# Patient Record
Sex: FEMALE | Race: BLACK OR AFRICAN AMERICAN | Employment: OTHER | URBAN - METROPOLITAN AREA
[De-identification: names, ages, dates, MRNs, and addresses within clinical notes are randomized per-mention and may not be internally consistent; named-entity substitution may affect disease eponyms.]

---

## 2016-04-12 LAB — COLONOSCOPY, EXTERNAL: NORMAL

## 2020-10-02 PROBLEM — R94.39 ABNORMAL STRESS TEST: Status: ACTIVE | Noted: 2018-04-13

## 2020-10-02 PROBLEM — E66.01 MORBID OBESITY (HCC): Status: ACTIVE | Noted: 2019-09-24

## 2020-10-02 PROBLEM — C64.2 RENAL CANCER, LEFT (HCC): Status: ACTIVE | Noted: 2020-09-14

## 2020-10-02 PROBLEM — Z88.0 ALLERGY TO PENICILLIN: Status: ACTIVE | Noted: 2017-04-13

## 2020-10-02 PROBLEM — R51.9 GENERALIZED HEADACHE: Status: ACTIVE | Noted: 2017-04-13

## 2020-10-02 PROBLEM — Z90.5 HISTORY OF PARTIAL NEPHRECTOMY: Status: ACTIVE | Noted: 2020-09-30

## 2020-10-02 PROBLEM — I10 ESSENTIAL HYPERTENSION: Status: ACTIVE | Noted: 2017-04-13

## 2020-10-02 PROBLEM — M17.9 OSTEOARTHRITIS OF KNEE: Status: ACTIVE | Noted: 2017-04-13

## 2020-10-02 PROBLEM — N28.89 RENAL MASS: Status: ACTIVE | Noted: 2020-09-09

## 2020-10-02 PROBLEM — I50.32 CHRONIC DIASTOLIC CONGESTIVE HEART FAILURE (HCC): Status: ACTIVE | Noted: 2018-04-17

## 2020-10-02 PROBLEM — G47.00 INSOMNIA: Status: ACTIVE | Noted: 2020-05-13

## 2020-10-02 PROBLEM — C64.9 RENAL CELL CARCINOMA (HCC): Status: ACTIVE | Noted: 2020-08-14

## 2020-10-02 PROBLEM — E11.9 TYPE 2 DIABETES MELLITUS (HCC): Status: ACTIVE | Noted: 2020-06-22

## 2020-10-02 PROBLEM — R60.9 EDEMA: Status: ACTIVE | Noted: 2017-04-13

## 2020-10-02 PROBLEM — I50.9 CONGESTIVE HEART FAILURE (HCC): Status: ACTIVE | Noted: 2018-04-20

## 2020-10-02 PROBLEM — E78.5 HYPERLIPIDEMIA: Status: ACTIVE | Noted: 2020-01-21

## 2021-06-17 LAB — HBA1C MFR BLD HPLC: 7 %

## 2022-01-13 ENCOUNTER — OFFICE VISIT (OUTPATIENT)
Dept: FAMILY MEDICINE CLINIC | Age: 62
End: 2022-01-13
Payer: COMMERCIAL

## 2022-01-13 VITALS
WEIGHT: 218 LBS | TEMPERATURE: 98.2 F | HEIGHT: 63 IN | SYSTOLIC BLOOD PRESSURE: 115 MMHG | RESPIRATION RATE: 20 BRPM | OXYGEN SATURATION: 99 % | HEART RATE: 73 BPM | DIASTOLIC BLOOD PRESSURE: 72 MMHG | BODY MASS INDEX: 38.62 KG/M2

## 2022-01-13 DIAGNOSIS — Z23 NEEDS FLU SHOT: ICD-10-CM

## 2022-01-13 DIAGNOSIS — I50.9 CONGESTIVE HEART FAILURE, UNSPECIFIED HF CHRONICITY, UNSPECIFIED HEART FAILURE TYPE (HCC): ICD-10-CM

## 2022-01-13 DIAGNOSIS — E11.9 CONTROLLED TYPE 2 DIABETES MELLITUS WITHOUT COMPLICATION, WITHOUT LONG-TERM CURRENT USE OF INSULIN (HCC): ICD-10-CM

## 2022-01-13 DIAGNOSIS — I10 ESSENTIAL HYPERTENSION: Primary | ICD-10-CM

## 2022-01-13 DIAGNOSIS — Z12.31 ENCOUNTER FOR SCREENING MAMMOGRAM FOR MALIGNANT NEOPLASM OF BREAST: ICD-10-CM

## 2022-01-13 PROCEDURE — 90686 IIV4 VACC NO PRSV 0.5 ML IM: CPT | Performed by: FAMILY MEDICINE

## 2022-01-13 PROCEDURE — 99204 OFFICE O/P NEW MOD 45 MIN: CPT | Performed by: FAMILY MEDICINE

## 2022-01-13 PROCEDURE — 90471 IMMUNIZATION ADMIN: CPT | Performed by: FAMILY MEDICINE

## 2022-01-13 RX ORDER — LOSARTAN POTASSIUM 100 MG/1
100 TABLET ORAL DAILY
Qty: 90 TABLET | Refills: 0 | Status: SHIPPED | OUTPATIENT
Start: 2022-01-13 | End: 2022-03-21 | Stop reason: DRUGHIGH

## 2022-01-13 NOTE — PATIENT INSTRUCTIONS
Vaccine Information Statement    Influenza (Flu) Vaccine (Inactivated or Recombinant): What You Need to Know    Many vaccine information statements are available in [place] and other languages. See www.immunize.org/vis. Hojas de información sobre vacunas están disponibles en español y en muchos otros idiomas. Visite www.immunize.org/vis. 1. Why get vaccinated? Influenza vaccine can prevent influenza (flu). Flu is a contagious disease that spreads around the [facility] every year, usually between October and May. Anyone can get the flu, but it is more dangerous for some people. Infants and young children, people 72 years and older, pregnant people, and people with certain health conditions or a weakened immune system are at greatest risk of flu complications. Pneumonia, bronchitis, sinus infections, and ear infections are examples of flu-related complications. If you have a medical condition, such as heart disease, cancer, or diabetes, flu can make it worse. Flu can cause fever and chills, sore throat, muscle aches, fatigue, cough, headache, and runny or stuffy nose. Some people may have vomiting and diarrhea, though this is more common in children than adults. In an average year, thousands of people in the [facility] die from flu, and many more are hospitalized. Flu vaccine prevents millions of illnesses and flu-related visits to the doctor each year. 2. Influenza vaccines     CDC recommends everyone 6 months and older get vaccinated every flu season. Children 6 months through 6years of age may need 2 doses during a single flu season. Everyone else needs only 1 dose each flu season. It takes about 2 weeks for protection to develop after vaccination. There are many flu viruses, and they are always changing. Each year a new flu vaccine is made to protect against the influenza viruses believed to be likely to cause disease in the upcoming flu season.  Even when the vaccine doesnt exactly match these viruses, it may still provide some protection. Influenza vaccine does not cause flu. Influenza vaccine may be given at the same time as other vaccines. 3. Talk with your health care provider    Tell your vaccination provider if the person getting the vaccine:   Has had an allergic reaction after a previous dose of influenza vaccine, or has any severe, life-threatening allergies    Has ever had Guillain-Barré Syndrome (also called GBS)    In some cases, your health care provider may decide to postpone influenza vaccination until a future visit. Influenza vaccine can be administered at any time during pregnancy. People who are or will be pregnant during influenza season should receive inactivated influenza vaccine. People with minor illnesses, such as a cold, may be vaccinated. People who are moderately or severely ill should usually wait until they recover before getting influenza vaccine. Your health care provider can give you more information. 4. Risks of a vaccine reaction     Soreness, redness, and swelling where the shot is given, fever, muscle aches, and headache can happen after influenza vaccination.  There may be a very small increased risk of Guillain-Barré Syndrome (GBS) after inactivated influenza vaccine (the flu shot). Lyubov Paul children who get the flu shot along with pneumococcal vaccine (PCV13) and/or DTaP vaccine at the same time might be slightly more likely to have a seizure caused by fever. Tell your health care provider if a child who is getting flu vaccine has ever had a seizure. People sometimes faint after medical procedures, including vaccination. Tell your provider if you feel dizzy or have vision changes or ringing in the ears. As with any medicine, there is a very remote chance of a vaccine causing a severe allergic reaction, other serious injury, or death. 5. What if there is a serious problem?     An allergic reaction could occur after the vaccinated person leaves the clinic. If you see signs of a severe allergic reaction (hives, swelling of the face and throat, difficulty breathing, a fast heartbeat, dizziness, or weakness), call 9-1-1 and get the person to the nearest hospital.    For other signs that concern you, call your health care provider. Adverse reactions should be reported to the Vaccine Adverse Event Reporting System (VAERS). Your health care provider will usually file this report, or you can do it yourself. Visit the VAERS website at www.vaers. [facility].gov or call 3-229.638.8589. VAERS is only for reporting reactions, and VAERS staff members do not give medical advice. 6. The National Vaccine Injury Compensation Program    The [facility] Vaccine Injury Compensation Program (VICP) is a federal program that was created to compensate people who may have been injured by certain vaccines. Claims regarding alleged injury or death due to vaccination have a time limit for filing, which may be as short as two years. Visit the VICP website at www.[facility].gov/vaccinecompensation or call 6-562.931.1107 to learn about the program and about filing a claim. 7. How can I learn more?  Ask your health care provider.  Call your local or state health department.  Visit the website of the Food and Drug Administration (FDA) for vaccine package inserts and additional information at www.fda.gov/vaccines-blood-biologics/vaccines.  Contact the Centers for Disease Control and Prevention (CDC):  - Call 5-442-451--529-879-9034 (5-967-NNJ-INFO) or  - Visit CDCs influenza website at www.cdc.gov/flu. Vaccine Information Statement   Inactivated Influenza Vaccine   8/6/2021  42 JOEJoo Gallo [id number]   Department of Health and Human Services  Centers for Disease Control and Prevention    Office Use Only

## 2022-01-13 NOTE — PROGRESS NOTES
Room:     Identified pt with two pt identifiers(name and ). Reviewed record in preparation for visit and have obtained necessary documentation. All patient medications has been reviewed. Chief Complaint   Patient presents with    New Patient    Establish Care    Medication Refill    Diabetes       Health Maintenance Due   Topic    Hepatitis C Screening     Foot Exam Q1     A1C test (Diabetic or Prediabetic)     MICROALBUMIN Q1     Eye Exam Retinal or Dilated     Lipid Screen     DTaP/Tdap/Td series (1 - Tdap)    Colorectal Cancer Screening Combo     Shingrix Vaccine Age 50> (1 of 2)    Breast Cancer Screen Mammogram     Flu Vaccine (1)       Vitals:    22 1416   BP: 115/72   Pulse: 73   Resp: 20   Temp: 98.2 °F (36.8 °C)   TempSrc: Oral   SpO2: 99%   Weight: 218 lb (98.9 kg)   Height: 5' 2.5\" (1.588 m)   PainSc:   4   PainLoc: Back       4. Have you been to the ER, urgent care clinic since your last visit? Hospitalized since your last visit? No    5. Have you seen or consulted any other health care providers outside of the [address] [place] [address] since your last visit? Include any pap smears or colon screening. No    6. Would you like to receive your flu shot today? Yes        Patient is accompanied by self I have received verbal consent from Glenna Jordan to discuss any/all medical information while they are present in the room.

## 2022-01-13 NOTE — PROGRESS NOTES
Nils Mitchell (: 1960) is a 59 y.o. female, new patient, here for evaluation of the following chief complaint(s):  New Patient, Establish Care, Medication Refill, and Diabetes         ASSESSMENT/PLAN:  Below is the assessment and plan developed based on review of pertinent history, physical exam, labs, studies, and medications. 1. Essential hypertension  Blood pressure at goal.  Labs reviewed from 2021 normal electrolytes and GFR. Calcium elevated at 10.9, plan to recheck labs in 2 to 4 weeks in office as no staff to draw lab available today, patient elects to return to office rather than go to [facility].   -     losartan (COZAAR) 100 mg tablet; Take 1 Tablet by mouth daily. , Normal, Disp-90 Tablet, R-0    2. Controlled type 2 diabetes mellitus without complication, without long-term current use of insulin (HCC)  Continue metformin, A1C 7.9 2021. Plan to recheck at follow up. 3. Encounter for screening mammogram for malignant neoplasm of breast  Due for mammogram.  -     SHAYE MAMMO BI SCREENING INCL CAD; Future    4. Needs flu shot  No contraindications noted. See nursing note for administration details. -     INFLUENZA VIRUS VAC QUAD,SPLIT,PRESV FREE SYRINGE IM    5. Congestive heart failure, unspecified HF chronicity, unspecified heart failure type ([facility] [address]  Diagnosed with hospitalization with exacerbation . Needs local cardiologist. Referral sent to Dr. Ladarius Brooks. -     REFERRAL TO CARDIOLOGY    6. Hyperlipidemia   Last labs 2021 cholesterol at goal. Continue lipitor 40mg. Recheck with fasting labs in 2 to 4 weeks. Return for 2 to 4 weeks for fasting labs . SUBJECTIVE/OBJECTIVE:  Chief Complaint   Patient presents with    New Patient    Establish Care    Medication Refill    Diabetes     Patient is a 58 yo female with diabetes, hypertension, hyperlipidemia, and congestive heart failure presenting to office to establish care. For diabetes, patient was diagnosed in 2020. She takes metformin 500mg BID. Reports fasting blood glucose has been around 120 every day. Last labwork including urine microalbumin was  6 months ago. For hypertension patient is taking losartan 100mg, furosemide 40mg, and Toprol XL 25mg. Blood pressure at home 130/70. Denies chest pain, SOB, headache, vision changes, weakness. She was diagnosed with CHF in 2020 as well after acute exacerbation. She takes furosemide 40mg daily as well as metoprolol 25mg XL. She has not seen cardiologist since discharge from hospital in 2020, only following with PCP. 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 Also had cardiac catheterization in 2020 which showed no CAD. Reports mild lower extremity edema towards the end of the day daily since CHF exacerbation. Renal cell carcinoma in 2020 as well. Managed by [facility]. Her  had a stroke 5 weeks ago and she is primary caregiver. They live with daughter and son in law. Reports she has difficulty sleeping at night, worried her  will get up and leave due to memory dysfunction. She use to take trazadone or ambien but stopped due to concern for memory during the day. She does not snore. Review of Systems   Constitutional: Negative for fatigue, fever and unexpected weight change. HENT: Negative for hearing loss. Eyes: Negative for pain and visual disturbance. Respiratory: Negative for cough, chest tightness and shortness of breath. Cardiovascular: Negative for chest pain, palpitations and leg swelling. Gastrointestinal: Negative for abdominal distention, abdominal pain, blood in stool, constipation and diarrhea. Genitourinary: Negative for dysuria and menstrual problem. Musculoskeletal: Negative for arthralgias and joint swelling. Neurological: Negative for dizziness, light-headedness and headaches. Psychiatric/Behavioral: Negative for agitation and behavioral problems.        Past Surgical History:   Procedure Laterality Date  HX HEART CATHETERIZATION      HX HYSTERECTOMY      HX NEPHRECTOMY  09/09/2020    Robotic assisted laparoscopic left partial nephrectomy    HX OTHER SURGICAL       Social History     Tobacco Use    Smoking status: Never Smoker    Smokeless tobacco: Never Used   Vaping Use    Vaping Use: Never used   Substance Use Topics    Alcohol use: Yes     Comment: social    Drug use: Never     Family History   Problem Relation Age of Onset    Lung Cancer Father     Heart Disease Maternal Grandmother     Heart Disease Mother      Patient Active Problem List    Diagnosis Date Noted    History of partial nephrectomy 09/30/2020    Renal cancer, left ([facility] [address] 09/14/2020    Renal mass 09/09/2020    Renal cell carcinoma ([facility] [address] 08/14/2020    Controlled type 2 diabetes mellitus without complication, without long-term current use of insulin ([facility] [address] 06/22/2020    Insomnia 05/13/2020    Hyperlipidemia 01/21/2020    Morbid obesity ([facility] [address] 09/24/2019    Congestive heart failure ([facility] [address] 04/20/2018    Chronic diastolic congestive heart failure ([facility] [address] 04/17/2018    Abnormal stress test 04/13/2018    Osteoarthritis of knee 04/13/2017    Generalized headache 04/13/2017    Essential hypertension 04/13/2017    Edema 04/13/2017    Allergy to penicillin 04/13/2017    S/P vaginal hysterectomy 07/19/2012    Uterine leiomyoma 07/18/2012    Abdominal pain, right lateral 07/12/2012    Stress incontinence in female 07/12/2012    Second degree uterine prolapse 07/12/2012    Pelvic relaxation disorder 07/12/2012    Menorrhagia 07/12/2012       Current Outpatient Medications on File Prior to Visit   Medication Sig Dispense Refill    acetaminophen (Tylenol Extra Strength) 500 mg tablet Take  by mouth every six (6) hours as needed for Pain.       atorvastatin (LIPITOR) 40 mg tablet TK 1 T PO QD HS      metFORMIN (GLUCOPHAGE) 500 mg tablet TK 2 TS PO BID      metoprolol succinate (TOPROL-XL) 25 mg XL tablet metoprolol succinate ER 25 mg tablet,extended release 24 hr      furosemide (LASIX) 40 mg tablet TK 1 T PO QD      [DISCONTINUED] baclofen (LIORESAL) 10 mg tablet baclofen 10 mg tablet   TAKE 1 TABLET BY MOUTH FOUR TIMES A DAY AS NEEDED      [DISCONTINUED] zolpidem (AMBIEN) 5 mg tablet zolpidem 5 mg tablet   Take 1 tablet every day by oral route.  [DISCONTINUED] traZODone (DESYREL) 50 mg tablet trazodone 50 mg tablet   Take 1 tablet every day by oral route as needed.  [DISCONTINUED] losartan (COZAAR) 100 mg tablet Take 100 mg by mouth daily. No current facility-administered medications on file prior to visit. Vitals:    01/13/22 1416   BP: 115/72   Pulse: 73   Resp: 20   Temp: 98.2 °F (36.8 °C)   TempSrc: Oral   SpO2: 99%   Weight: 218 lb (98.9 kg)   Height: 5' 2.5\" (1.588 m)   PainSc:   4   PainLoc: Back        Physical Exam  Constitutional:       Appearance: Normal appearance. HENT:      Head: Normocephalic and atraumatic. Eyes:      Extraocular Movements: Extraocular movements intact. Conjunctiva/sclera: Conjunctivae normal.      Pupils: Pupils are equal, round, and reactive to light. Cardiovascular:      Rate and Rhythm: Normal rate and regular rhythm. Pulses: Normal pulses. Heart sounds: Murmur (1/6 soft holosystolic murmur heart over aortica and pulmonic areas. does not radiate to carotids) heard. Pulmonary:      Effort: Pulmonary effort is normal.      Breath sounds: Normal breath sounds. Abdominal:      General: Abdomen is flat. Bowel sounds are normal.      Palpations: Abdomen is soft. Musculoskeletal:      Right lower leg: No edema. Left lower leg: No edema. Neurological:      Mental Status: She is alert. Psychiatric:         Mood and Affect: Mood normal.         Behavior: Behavior normal.             An electronic signature was used to authenticate this note.   -- Willian Castaneda PA-C

## 2022-01-20 ENCOUNTER — TRANSCRIBE ORDER (OUTPATIENT)
Dept: SCHEDULING | Age: 62
End: 2022-01-20

## 2022-01-20 DIAGNOSIS — Z12.31 VISIT FOR SCREENING MAMMOGRAM: Primary | ICD-10-CM

## 2022-01-24 ENCOUNTER — HOSPITAL ENCOUNTER (OUTPATIENT)
Dept: MAMMOGRAPHY | Age: 62
Discharge: HOME | End: 2022-01-24
Attending: FAMILY MEDICINE
Payer: COMMERCIAL

## 2022-01-24 DIAGNOSIS — Z12.31 VISIT FOR SCREENING MAMMOGRAM: ICD-10-CM

## 2022-01-24 PROCEDURE — 77063 BREAST TOMOSYNTHESIS BI: CPT

## 2022-01-25 NOTE — PROGRESS NOTES
Please call patient and let her know screening mammogram showed no evidence of malignancy, recommend repeat in 1 year.    Rita Mathis PA-C

## 2022-02-08 DIAGNOSIS — I10 ESSENTIAL HYPERTENSION: ICD-10-CM

## 2022-02-08 RX ORDER — LOSARTAN POTASSIUM 100 MG/1
100 TABLET ORAL DAILY
Qty: 90 TABLET | Refills: 0 | OUTPATIENT
Start: 2022-02-08

## 2022-02-08 RX ORDER — METFORMIN HYDROCHLORIDE 500 MG/1
1000 TABLET ORAL 2 TIMES DAILY WITH MEALS
Qty: 120 TABLET | Refills: 2 | Status: SHIPPED | OUTPATIENT
Start: 2022-02-08 | End: 2022-05-11

## 2022-02-08 RX ORDER — FUROSEMIDE 40 MG/1
40 TABLET ORAL DAILY
Qty: 90 TABLET | Refills: 0 | Status: SHIPPED | OUTPATIENT
Start: 2022-02-08 | End: 2022-05-11

## 2022-02-08 RX ORDER — METFORMIN HYDROCHLORIDE 500 MG/1
TABLET ORAL
OUTPATIENT
Start: 2022-02-08

## 2022-02-08 NOTE — TELEPHONE ENCOUNTER
Can you call patient and verify metformin dose? Does she take 1 or 2 tablets twice a day? Also, I just sent in losartan about 3 weeks ago. Did she get that rx? It was for 90 days.

## 2022-02-08 NOTE — TELEPHONE ENCOUNTER
Pt id x 3. States that she takes metformin as pended and also needs the furosemide that was pended as well.

## 2022-02-10 ENCOUNTER — OFFICE VISIT (OUTPATIENT)
Dept: FAMILY MEDICINE CLINIC | Age: 62
End: 2022-02-10
Payer: COMMERCIAL

## 2022-02-10 VITALS
SYSTOLIC BLOOD PRESSURE: 109 MMHG | HEART RATE: 63 BPM | HEIGHT: 62 IN | BODY MASS INDEX: 40.34 KG/M2 | OXYGEN SATURATION: 100 % | DIASTOLIC BLOOD PRESSURE: 72 MMHG | WEIGHT: 219.2 LBS | TEMPERATURE: 98 F | RESPIRATION RATE: 14 BRPM

## 2022-02-10 DIAGNOSIS — E11.9 CONTROLLED TYPE 2 DIABETES MELLITUS WITHOUT COMPLICATION, WITHOUT LONG-TERM CURRENT USE OF INSULIN (HCC): ICD-10-CM

## 2022-02-10 DIAGNOSIS — R59.0 EPITROCHLEAR LYMPHADENOPATHY: Primary | ICD-10-CM

## 2022-02-10 DIAGNOSIS — M79.602 LEFT ARM PAIN: ICD-10-CM

## 2022-02-10 DIAGNOSIS — C64.2 RENAL CELL CARCINOMA OF LEFT KIDNEY (HCC): ICD-10-CM

## 2022-02-10 DIAGNOSIS — E78.5 HYPERLIPIDEMIA, UNSPECIFIED HYPERLIPIDEMIA TYPE: ICD-10-CM

## 2022-02-10 DIAGNOSIS — I10 ESSENTIAL HYPERTENSION: ICD-10-CM

## 2022-02-10 PROCEDURE — 99214 OFFICE O/P EST MOD 30 MIN: CPT | Performed by: FAMILY MEDICINE

## 2022-02-10 NOTE — PROGRESS NOTES
Kermit Severe (: 1960) is a 59 y.o. female, established patient, here for evaluation of the following chief complaint(s):  Follow-up and Labs         ASSESSMENT/PLAN:  Below is the assessment and plan developed based on review of pertinent history, physical exam, labs, studies, and medications. 1. Epitrochlear lymphadenopathy  2. Left arm pain  1 small epitrochlear lymph node palpated on exam. With history of renal cell carcinoma and concurrent night sweats x 4 months, proceed with labs and ultrasound of left arm.   -     US EXT NONVAS LT COMP; Future    3. Renal cell carcinoma of left kidney (HCC)  Renal cell carcinoma in 2020, she followed with [facility]. Check CMP, also check urine with micro given new possible epitrochlear lymphadenopathy   -     URINALYSIS W/MICROSCOPIC; Future  -     METABOLIC PANEL, COMPREHENSIVE; Future    4. Controlled type 2 diabetes mellitus without complication, without long-term current use of insulin (HCC)  Continue metformin, A1C 7.9 2021. Recheck today. Diabetic foot exam today looked good. Last retinal eye exam per patient 10/2021.  -     MICROALBUMIN, UR, RAND W/ MICROALB/CREAT RATIO; Future  -     METABOLIC PANEL, COMPREHENSIVE; Future  -     CBC WITH AUTOMATED DIFF; Future  -     HEMOGLOBIN A1C WITH EAG; Future  -      DIABETES FOOT EXAM    5. Essential hypertension  Labs reviewed from 2021 normal electrolytes and GFR. Calcium elevated at 10.9  Recheck labs today. BP at goal in office 503/99  -     METABOLIC PANEL, COMPREHENSIVE; Future  -     CBC WITH AUTOMATED DIFF; Future    6. Hyperlipidemia, unspecified hyperlipidemia type  Last labs 2021 cholesterol at goal. Continue lipitor 40mg,, recheck fasting labs today. -     METABOLIC PANEL, COMPREHENSIVE; Future  -     LIPID PANEL; Future      No follow-ups on file.       SUBJECTIVE/OBJECTIVE:  Chief Complaint   Patient presents with    Follow-up    Labs     Patient is a 58 yo female with hypertension, hyperlipidemia, T2DM, CHD, history of renal cell carcinoma, left, 2020 presenting to office for physical.     For acute complaints, patient notes left arm pain x 1 month. Notes pain proximal to medial epicondyle with tender bumps. Notes over last month bumps seem to have spread distally in linear pattern to forearm. Also c/o intermittent night sweats for last 4 months. These started after  had a stroke. She wakes 4/7 nights per week drenched in sweat. Denies fevers, chills, recent illness, or bone pain. She does report 1 episode last week of sharp, LLQ pain that resolved after 5 minutes and has not returned. Reports last diabetic eye exam 10/2021. Review of Systems   Constitutional: Negative for activity change, appetite change, chills, diaphoresis, fatigue, fever and unexpected weight change. HENT: Negative for congestion. Eyes: Negative for visual disturbance. Respiratory: Negative for cough, chest tightness and shortness of breath. Cardiovascular: Negative for chest pain, palpitations and leg swelling. Gastrointestinal: Negative for abdominal distention, anal bleeding, blood in stool, constipation, diarrhea and nausea. Genitourinary: Negative for flank pain, frequency and hematuria. Musculoskeletal: Negative for back pain, neck pain and neck stiffness. Neurological: Negative for weakness, light-headedness and headaches. Current Outpatient Medications on File Prior to Visit   Medication Sig Dispense Refill    furosemide (LASIX) 40 mg tablet Take 1 Tablet by mouth daily. 90 Tablet 0    metFORMIN (GLUCOPHAGE) 500 mg tablet Take 2 Tablets by mouth two (2) times daily (with meals). 120 Tablet 2    losartan (COZAAR) 100 mg tablet Take 1 Tablet by mouth daily. 90 Tablet 0    acetaminophen (Tylenol Extra Strength) 500 mg tablet Take  by mouth every six (6) hours as needed for Pain.       atorvastatin (LIPITOR) 40 mg tablet TK 1 T PO QD HS      metoprolol succinate (TOPROL-XL) 25 mg XL tablet metoprolol succinate ER 25 mg tablet,extended release 24 hr       No current facility-administered medications on file prior to visit. Patient Active Problem List    Diagnosis Date Noted    History of partial nephrectomy 09/30/2020    Renal cancer, left ([facility] 09/14/2020    Renal mass 09/09/2020    Renal cell carcinoma ([facility] 08/14/2020    Controlled type 2 diabetes mellitus without complication, without long-term current use of insulin ([facility] 06/22/2020    Insomnia 05/13/2020    Hyperlipidemia 01/21/2020    Morbid obesity ([facility] 09/24/2019    Congestive heart failure ([facility] 04/20/2018    Chronic diastolic congestive heart failure ([facility] 04/17/2018    Abnormal stress test 04/13/2018    Osteoarthritis of knee 04/13/2017    Generalized headache 04/13/2017    Essential hypertension 04/13/2017    Edema 04/13/2017    Allergy to penicillin 04/13/2017    S/P vaginal hysterectomy 07/19/2012    Uterine leiomyoma 07/18/2012    Abdominal pain, right lateral 07/12/2012    Stress incontinence in female 07/12/2012    Second degree uterine prolapse 07/12/2012    Pelvic relaxation disorder 07/12/2012    Menorrhagia 07/12/2012       Vitals:    02/10/22 0839 02/10/22 0918   BP: (!) 144/72 109/72   Pulse: 63    Resp: 14    Temp: 98 °F (36.7 °C)    TempSrc: Oral    SpO2: 100%    Weight: 219 lb 3.2 oz (99.4 kg)    Height: 5' 2\" (1.575 m)    PainSc:   0 - No pain         Physical Exam  Constitutional:       Appearance: Normal appearance. HENT:      Head: Normocephalic and atraumatic. Eyes:      Extraocular Movements: Extraocular movements intact. Conjunctiva/sclera: Conjunctivae normal.      Pupils: Pupils are equal, round, and reactive to light. Cardiovascular:      Rate and Rhythm: Normal rate and regular rhythm. Pulses: Normal pulses. Heart sounds: Normal heart sounds.    Pulmonary:      Effort: Pulmonary effort is normal.      Breath sounds: Normal breath sounds. Chest:   Breasts:      Right: No axillary adenopathy or supraclavicular adenopathy. Left: No axillary adenopathy or supraclavicular adenopathy. Abdominal:      General: Abdomen is flat. Bowel sounds are normal.      Palpations: Abdomen is soft. Musculoskeletal:      Right shoulder: Normal.      Left shoulder: Normal.      Right upper arm: Normal.      Left upper arm: Tenderness (mild tenderness to soft tissue anterior left arm and forearm. ) present. No swelling, edema, deformity or bony tenderness. Right elbow: Normal.      Left elbow: Normal.      Right wrist: Normal. Normal pulse. Left wrist: Normal. Normal pulse. Right hand: Normal. Normal capillary refill. Normal pulse. Left hand: Normal. Normal capillary refill. Normal pulse. Lymphadenopathy:      Head:      Right side of head: No submental, submandibular, tonsillar, preauricular, posterior auricular or occipital adenopathy. Left side of head: No submental, submandibular, tonsillar, preauricular, posterior auricular or occipital adenopathy. Cervical: No cervical adenopathy. Right cervical: No superficial, deep or posterior cervical adenopathy. Left cervical: No superficial, deep or posterior cervical adenopathy. Upper Body:      Right upper body: No supraclavicular, axillary, pectoral or epitrochlear adenopathy. Left upper body: Epitrochlear adenopathy (1 epotroclear lymph node palpated ) present. No supraclavicular, axillary or pectoral adenopathy. Neurological:      Mental Status: She is alert.    Psychiatric:         Mood and Affect: Mood normal.         Behavior: Behavior normal.         Diabetic foot exam:     Left Foot:   Visual Exam: callous - small callous heel    Pulse DP: 2+ (normal)   Filament test: normal sensation          Right Foot:   Visual Exam: callous - small callous heel   Pulse DP: 2+ (normal)   Filament test: normal sensation       An electronic signature was used to authenticate this note.   -- Liseth Kennedy PA-C

## 2022-02-10 NOTE — PROGRESS NOTES
Miky Martinez is a 59 y.o. female      Chief Complaint   Patient presents with    Follow-up    Labs       1. Have you been to the ER, urgent care clinic since your last visit? Hospitalized since your last visit? No    2. Have you seen or consulted any other health care providers outside of the [address] [place] [address] since your last visit? Include any pap smears or colon screening.  No

## 2022-02-11 LAB
ALBUMIN SERPL-MCNC: 3.5 G/DL (ref 3.5–5)
ALBUMIN/GLOB SERPL: 0.9 {RATIO} (ref 1.1–2.2)
ALP SERPL-CCNC: 77 U/L (ref 45–117)
ALT SERPL-CCNC: 23 U/L (ref 12–78)
ANION GAP SERPL CALC-SCNC: 5 MMOL/L (ref 5–15)
APPEARANCE UR: ABNORMAL
AST SERPL-CCNC: 25 U/L (ref 15–37)
BACTERIA URNS QL MICRO: NEGATIVE /HPF
BASOPHILS # BLD: 0.1 K/UL (ref 0–0.1)
BASOPHILS NFR BLD: 1 % (ref 0–1)
BILIRUB SERPL-MCNC: 0.8 MG/DL (ref 0.2–1)
BILIRUB UR QL: NEGATIVE
BUN SERPL-MCNC: 14 MG/DL (ref 6–20)
BUN/CREAT SERPL: 17 (ref 12–20)
CALCIUM SERPL-MCNC: 10.7 MG/DL (ref 8.5–10.1)
CHLORIDE SERPL-SCNC: 104 MMOL/L (ref 97–108)
CHOLEST SERPL-MCNC: 125 MG/DL
CO2 SERPL-SCNC: 29 MMOL/L (ref 21–32)
COLOR UR: ABNORMAL
CREAT SERPL-MCNC: 0.81 MG/DL (ref 0.55–1.02)
CREAT UR-MCNC: 81.3 MG/DL
DIFFERENTIAL METHOD BLD: NORMAL
EOSINOPHIL # BLD: 0.3 K/UL (ref 0–0.4)
EOSINOPHIL NFR BLD: 5 % (ref 0–7)
EPITH CASTS URNS QL MICRO: ABNORMAL /LPF
ERYTHROCYTE [DISTWIDTH] IN BLOOD BY AUTOMATED COUNT: 13.1 % (ref 11.5–14.5)
EST. AVERAGE GLUCOSE BLD GHB EST-MCNC: 166 MG/DL
GLOBULIN SER CALC-MCNC: 3.8 G/DL (ref 2–4)
GLUCOSE SERPL-MCNC: 130 MG/DL (ref 65–100)
GLUCOSE UR STRIP.AUTO-MCNC: NEGATIVE MG/DL
HBA1C MFR BLD: 7.4 % (ref 4–5.6)
HCT VFR BLD AUTO: 37.7 % (ref 35–47)
HDLC SERPL-MCNC: 68 MG/DL
HDLC SERPL: 1.8 {RATIO} (ref 0–5)
HGB BLD-MCNC: 11.7 G/DL (ref 11.5–16)
HGB UR QL STRIP: NEGATIVE
HYALINE CASTS URNS QL MICRO: ABNORMAL /LPF (ref 0–5)
IMM GRANULOCYTES # BLD AUTO: 0 K/UL (ref 0–0.04)
IMM GRANULOCYTES NFR BLD AUTO: 0 % (ref 0–0.5)
KETONES UR QL STRIP.AUTO: NEGATIVE MG/DL
LDLC SERPL CALC-MCNC: 41.8 MG/DL (ref 0–100)
LEUKOCYTE ESTERASE UR QL STRIP.AUTO: NEGATIVE
LYMPHOCYTES # BLD: 1.7 K/UL (ref 0.8–3.5)
LYMPHOCYTES NFR BLD: 32 % (ref 12–49)
MCH RBC QN AUTO: 29.5 PG (ref 26–34)
MCHC RBC AUTO-ENTMCNC: 31 G/DL (ref 30–36.5)
MCV RBC AUTO: 95 FL (ref 80–99)
MICROALBUMIN UR-MCNC: 0.78 MG/DL
MICROALBUMIN/CREAT UR-RTO: 10 MG/G (ref 0–30)
MONOCYTES # BLD: 0.6 K/UL (ref 0–1)
MONOCYTES NFR BLD: 11 % (ref 5–13)
NEUTS SEG # BLD: 2.7 K/UL (ref 1.8–8)
NEUTS SEG NFR BLD: 51 % (ref 32–75)
NITRITE UR QL STRIP.AUTO: NEGATIVE
NRBC # BLD: 0 K/UL (ref 0–0.01)
NRBC BLD-RTO: 0 PER 100 WBC
PH UR STRIP: 5.5 [PH] (ref 5–8)
PLATELET # BLD AUTO: 317 K/UL (ref 150–400)
PMV BLD AUTO: 10 FL (ref 8.9–12.9)
POTASSIUM SERPL-SCNC: 4.6 MMOL/L (ref 3.5–5.1)
PROT SERPL-MCNC: 7.3 G/DL (ref 6.4–8.2)
PROT UR STRIP-MCNC: NEGATIVE MG/DL
RBC # BLD AUTO: 3.97 M/UL (ref 3.8–5.2)
RBC #/AREA URNS HPF: ABNORMAL /HPF (ref 0–5)
SODIUM SERPL-SCNC: 138 MMOL/L (ref 136–145)
SP GR UR REFRACTOMETRY: 1.01 (ref 1–1.03)
TRIGL SERPL-MCNC: 76 MG/DL (ref ?–150)
UROBILINOGEN UR QL STRIP.AUTO: 0.2 EU/DL (ref 0.2–1)
VLDLC SERPL CALC-MCNC: 15.2 MG/DL
WBC # BLD AUTO: 5.3 K/UL (ref 3.6–11)
WBC URNS QL MICRO: ABNORMAL /HPF (ref 0–4)

## 2022-02-14 NOTE — PROGRESS NOTES
Please call patient and advise. Her A1C remains above goal at 7.4. I recommend we add an additional medication called Trulicity. This is a once weekly injection. If she is agreeable to this I will send the starting dose to the pharmacy. It is also reasonable for her to return to the office to discuss starting this medication as I recommend she return to office in 2 weeks for repeat labs-- her calcium is slightly high and we should recheck this with Vit D and parathyroid hormone. Her cholesterol looks great. Blood counts are normal. Remainder of kidney, liver, and electrolytes look great. Urine micro normal. Urinalysis is negative for blood or other signs of kidney dysfunction.

## 2022-02-16 ENCOUNTER — HOSPITAL ENCOUNTER (OUTPATIENT)
Dept: ULTRASOUND IMAGING | Age: 62
Discharge: HOME | End: 2022-02-16
Attending: FAMILY MEDICINE
Payer: COMMERCIAL

## 2022-02-16 DIAGNOSIS — R59.0 EPITROCHLEAR LYMPHADENOPATHY: ICD-10-CM

## 2022-02-16 DIAGNOSIS — M79.602 LEFT ARM PAIN: ICD-10-CM

## 2022-02-16 PROCEDURE — 76882 US LMTD JT/FCL EVL NVASC XTR: CPT

## 2022-02-28 ENCOUNTER — OFFICE VISIT (OUTPATIENT)
Dept: CARDIOLOGY CLINIC | Age: 62
End: 2022-02-28
Payer: COMMERCIAL

## 2022-02-28 VITALS
DIASTOLIC BLOOD PRESSURE: 74 MMHG | BODY MASS INDEX: 39.93 KG/M2 | HEIGHT: 62 IN | WEIGHT: 217 LBS | SYSTOLIC BLOOD PRESSURE: 120 MMHG | HEART RATE: 67 BPM | OXYGEN SATURATION: 99 %

## 2022-02-28 DIAGNOSIS — R01.1 MURMUR: ICD-10-CM

## 2022-02-28 DIAGNOSIS — I50.32 CHRONIC DIASTOLIC CONGESTIVE HEART FAILURE (HCC): Primary | ICD-10-CM

## 2022-02-28 DIAGNOSIS — I10 ESSENTIAL HYPERTENSION: ICD-10-CM

## 2022-02-28 PROCEDURE — 93000 ELECTROCARDIOGRAM COMPLETE: CPT | Performed by: SPECIALIST

## 2022-02-28 PROCEDURE — 99204 OFFICE O/P NEW MOD 45 MIN: CPT | Performed by: SPECIALIST

## 2022-02-28 RX ORDER — CIDER VINEGAR 300 MG
1 TABLET ORAL EVERY MORNING
COMMUNITY
End: 2022-09-08

## 2022-02-28 RX ORDER — VITAMIN A 2400 MCG
1 CAPSULE ORAL EVERY MORNING
COMMUNITY
End: 2022-09-08

## 2022-02-28 NOTE — PROGRESS NOTES
Alexander Murillo is a 59 y.o. female    Visit Vitals  /74 (BP 1 Location: Left upper arm, BP Patient Position: Sitting, BP Cuff Size: Adult)   Pulse 67   Ht 5' 2\" (1.575 m)   Wt 217 lb (98.4 kg)   SpO2 99%   BMI 39.69 kg/m²       Chief Complaint   Patient presents with    CHF    Cholesterol Problem    Hypertension       Chest pain NO  SOB NO  Dizziness NO  Swelling LEGS AND ANKLES  Recent hospital visit NO  Refills NO  COVID VACCINE STATUS YES  HAD COVID?  NO

## 2022-02-28 NOTE — PROGRESS NOTES
Angelica Herrera MD. [facility]              Patient: Glenna Jordan  : 1960      Today's Date: 2022        HISTORY OF PRESENT ILLNESS:     History of Present Illness:  Referred for CHF history. She says she has a history - was seeing Cardiology in [facility]. She has been doing fine lately. Has infrequent sharp chest ache. Breathing is OK - no orthopnea. Class 2 [place]. PAST MEDICAL HISTORY:     Past Medical History:   Diagnosis Date    Arthritis     [facility]     Diabetes ([facility]     Hypertension     Left renal mass     Renal cell cancer, left (HCC)     Clear cell renal cell carcinoma. Past Surgical History:   Procedure Laterality Date    HX HEART CATHETERIZATION      HX HYSTERECTOMY      HX NEPHRECTOMY  2020    Robotic assisted laparoscopic left partial nephrectomy    HX OTHER SURGICAL             MEDICATIONS:     Current Outpatient Medications   Medication Sig Dispense Refill    TURMERIC, BULK, by Does Not Apply route.  ginger, Zingiber officinalis, (ginger extract) 250 mg cap Take  by mouth.  Apple Cider Vinegar 300 mg tab Take  by mouth.  furosemide (LASIX) 40 mg tablet Take 1 Tablet by mouth daily. 90 Tablet 0    metFORMIN (GLUCOPHAGE) 500 mg tablet Take 2 Tablets by mouth two (2) times daily (with meals). 120 Tablet 2    losartan (COZAAR) 100 mg tablet Take 1 Tablet by mouth daily. 90 Tablet 0    acetaminophen (Tylenol Extra Strength) 500 mg tablet Take  by mouth every six (6) hours as needed for Pain.       atorvastatin (LIPITOR) 40 mg tablet TK 1 T PO QD HS      metoprolol succinate (TOPROL-XL) 25 mg XL tablet metoprolol succinate ER 25 mg tablet,extended release 24 hr         Allergies   Allergen Reactions    Cephalexin Rash    Doxycycline Nausea Only    Hydrocodone-Acetaminophen Itching    Meperidine Itching    Oxycodone-Acetaminophen Hives and Rash    Penicillins Hives and Itching    Propoxyphene N-Acetaminophen Hives and Rash    Shellfish Containing Products Other (comments) and Rash     Stomach pains      Sulfa (Sulfonamide Antibiotics) Itching    Triamterene-Hydrochlorothiazid Myalgia             SOCIAL HISTORY:     Social History     Tobacco Use    Smoking status: Never Smoker    Smokeless tobacco: Never Used   Vaping Use    Vaping Use: Never used   Substance Use Topics    Alcohol use: Yes     Comment: social    Drug use: Never         FAMILY HISTORY:     Family History   Problem Relation Age of Onset    Lung Cancer Father     Heart Disease Maternal Grandmother     Heart Disease Mother     Breast Cancer Paternal Aunt            REVIEW OF SYMPTOMS:     Review of Symptoms:  Constitutional: Negative for fever, chills  HEENT: Negative for nosebleeds, tinnitus, and vision changes. Respiratory: Negative for cough, wheezing  Cardiovascular: Negative for orthopnea, claudication, syncope, and PND. Gastrointestinal: Negative for abdominal pain, diarrhea, melena. Genitourinary: Negative for dysuria  Musculoskeletal: Negative for myalgias. Skin: Negative for rash  Heme: No problems bleeding. Neurological: Negative for speech change and focal weakness. PHYSICAL EXAM:     Physical Exam:  Visit Vitals  /74 (BP 1 Location: Left upper arm, BP Patient Position: Sitting, BP Cuff Size: Adult)   Pulse 67   Ht 5' 2\" (1.575 m)   Wt 217 lb (98.4 kg)   SpO2 99%   BMI 39.69 kg/m²     Patient appears generally well, mood and affect are appropriate and pleasant. HEENT:  Hearing intact, non-icteric, normocephalic, atraumatic. Neck Exam: Supple, No JVD or carotid bruits. Lung Exam: Clear to auscultation, even breath sounds. Cardiac Exam: Regular rate and rhythm with 7-6/1 systolic murmur sternal border  Abdomen: Soft, non-tender, normal bowel sounds. Obese   Extremities: Moves all ext well. No lower extremity edema.   MSKTL: Overall good ROM ext  Skin: No significant rashes  Psych: Appropriate affect  Neuro - Grossly intact      LABS / OTHER STUDIES reviewed:       Lab Results   Component Value Date/Time    Sodium 138 02/10/2022 09:25 AM    Potassium 4.6 02/10/2022 09:25 AM    Chloride 104 02/10/2022 09:25 AM    CO2 29 02/10/2022 09:25 AM    Anion gap 5 02/10/2022 09:25 AM    Glucose 130 (H) 02/10/2022 09:25 AM    BUN 14 02/10/2022 09:25 AM    Creatinine 0.81 02/10/2022 09:25 AM    BUN/Creatinine ratio 17 02/10/2022 09:25 AM    GFR est AA >60 02/10/2022 09:25 AM    GFR est non-AA >60 02/10/2022 09:25 AM    Calcium 10.7 (H) 02/10/2022 09:25 AM    Bilirubin, total 0.8 02/10/2022 09:25 AM    Alk. phosphatase 77 02/10/2022 09:25 AM    Protein, total 7.3 02/10/2022 09:25 AM    Albumin 3.5 02/10/2022 09:25 AM    Globulin 3.8 02/10/2022 09:25 AM    A-G Ratio 0.9 (L) 02/10/2022 09:25 AM    ALT (SGPT) 23 02/10/2022 09:25 AM    AST (SGOT) 25 02/10/2022 09:25 AM     Lab Results   Component Value Date/Time    WBC 5.3 02/10/2022 09:25 AM    HGB 11.7 02/10/2022 09:25 AM    HCT 37.7 02/10/2022 09:25 AM    PLATELET 769 17/45/7738 09:25 AM    MCV 95.0 02/10/2022 09:25 AM       Lab Results   Component Value Date/Time    Cholesterol, total 125 02/10/2022 09:25 AM    HDL Cholesterol 68 02/10/2022 09:25 AM    LDL, calculated 41.8 02/10/2022 09:25 AM    VLDL, calculated 15.2 02/10/2022 09:25 AM    Triglyceride 76 02/10/2022 09:25 AM    CHOL/HDL Ratio 1.8 02/10/2022 09:25 AM         CARDIAC DIAGNOSTICS:     Cardiac Evaluation Includes:  I reviewed the results below.      EKG 2/28/22 - NSR, normal      ASSESSMENT AND PLAN:     Assessment and Plan:    1) History of CHF  - will get records from [facility], [facility]   - check an echo   - cont BB, ARB, lasix   - she seems volume compensated     2) Murmur   - check an echo     3) HTN  - BP OK - cont meds     4) Dyslipidemia   - cont statin   - prior labs OK     5) See me in 12 months   Takes care of  (stroke Nov 21)         Yoana Joseph MD, [address] [facility] [address]  [address] [place] [address] [address]  [address], [address] [place] [address] Drive  [address]  Ph: [phone]    [phone]      ADDENDUM   3/22/2022    Echo 3/21/22 - LVEF 63%, mild MAC    Will send a message

## 2022-03-04 ENCOUNTER — OFFICE VISIT (OUTPATIENT)
Dept: FAMILY MEDICINE CLINIC | Age: 62
End: 2022-03-04
Payer: COMMERCIAL

## 2022-03-04 VITALS
RESPIRATION RATE: 18 BRPM | HEIGHT: 62 IN | TEMPERATURE: 98.1 F | HEART RATE: 56 BPM | BODY MASS INDEX: 39.56 KG/M2 | SYSTOLIC BLOOD PRESSURE: 118 MMHG | DIASTOLIC BLOOD PRESSURE: 70 MMHG | OXYGEN SATURATION: 98 % | WEIGHT: 215 LBS

## 2022-03-04 DIAGNOSIS — E11.9 CONTROLLED TYPE 2 DIABETES MELLITUS WITHOUT COMPLICATION, WITHOUT LONG-TERM CURRENT USE OF INSULIN (HCC): ICD-10-CM

## 2022-03-04 DIAGNOSIS — R00.1 BRADYCARDIA: ICD-10-CM

## 2022-03-04 DIAGNOSIS — E83.52 SERUM CALCIUM ELEVATED: Primary | ICD-10-CM

## 2022-03-04 PROCEDURE — 99214 OFFICE O/P EST MOD 30 MIN: CPT | Performed by: FAMILY MEDICINE

## 2022-03-04 PROCEDURE — 3051F HG A1C>EQUAL 7.0%<8.0%: CPT | Performed by: FAMILY MEDICINE

## 2022-03-04 NOTE — PROGRESS NOTES
Alexander Murillo (: 1960) is a 59 y.o. female, established patient, here for evaluation of the following chief complaint(s):  Diabetes (2 week f/u) and Labs         ASSESSMENT/PLAN:  Below is the assessment and plan developed based on review of pertinent history, physical exam, labs, studies, and medications. 1. Serum calcium elevated  Calcium was 10.9 labs 2021 with previous PCP, remained elevated 10.7 with labs 3 weeks ago. Recheck today along with Vit D and PTH. Alter management based on results. -     METABOLIC PANEL, BASIC; Future  -     VITAMIN D, 25 HYDROXY; Future  -     PTH INTACT; Future    2. Controlled type 2 diabetes mellitus without complication, without long-term current use of insulin ([facility])  Lab Results   Component Value Date/Time    Hemoglobin A1c 7.4 (H) 02/10/2022 09:25 AM    Hemoglobin A1c, External 7.0 2021 12:00 AM     Discussed with patient starting GLP-1 agonist trulicity. Discussed benefits with atherosclerotic cardiovascular disease and weight loss. Discussed with patient meeting with dietician to learn more about dietary changes she could incorporate as diet right now is not low carb. Patient declines starting trulicity or second med for diabetes. Plan for referral to dietician, repeat A1c in 3 months   -     REFERRAL TO DIETITIAN    3. Bradycardia  HR mildly bradycardic on exam at 56bpm. Blood pressure originally borderline low, improved with repeat manual measurement. Patient denies sx including palpitations, irregular heart beat, SOB, chest pain, increased fatigue, lightheadedness. Rhythm regular on auscultation. She is on toprolXL 25mg daily. Advised to check pulse at home several times per day, educated patient how to do so. Return to clinic if <60, seek medical care for development new symptoms. Patient agrees to plan. Return in about 3 months (around 2022).       SUBJECTIVE/OBJECTIVE:  Chief Complaint   Patient presents with    Diabetes     2 week f/u    Labs     Patient is a 60 yo female with T2DM, HTN, hyperlipidemia, history of CHF presenting to office for diabetic med discussion and recheck labs. Last A1C 7.4 2/10/2022. Worsened from 7.0 6/17/2021. Patient taking metformin 1000mg BID. 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 Notes  yesterday after she had lemonade. Does not remember fasting glucose. Patient reports she does not have history of hypercalcemia but she has a family member who did have hypercalcemia, unsure cause. She has had occasional night sweats over last 4 months since husbands stroke-- high stress at home. Ultrasound of left arm 2 weeks ago for bumps showed lipomas and no lymphadenopathy. Hx renal cell carcinoma 2020. She saw Dr. Reji Kinney cardiology 2/28/2022, has echo scheduled 3/21 for history of CHF. Patient notes she is always fatigued due to caregiving for . She denies chest pain, palpitations, irregular heartbeat, SOB, lower extremity edema, confusion    Current Outpatient Medications on File Prior to Visit   Medication Sig Dispense Refill    TURMERIC, BULK, by Does Not Apply route.  ginger, Zingiber officinalis, (ginger extract) 250 mg cap Take  by mouth.  Apple Cider Vinegar 300 mg tab Take  by mouth.  furosemide (LASIX) 40 mg tablet Take 1 Tablet by mouth daily. 90 Tablet 0    metFORMIN (GLUCOPHAGE) 500 mg tablet Take 2 Tablets by mouth two (2) times daily (with meals). 120 Tablet 2    losartan (COZAAR) 100 mg tablet Take 1 Tablet by mouth daily. 90 Tablet 0    acetaminophen (Tylenol Extra Strength) 500 mg tablet Take  by mouth every six (6) hours as needed for Pain.       atorvastatin (LIPITOR) 40 mg tablet TK 1 T PO QD HS      metoprolol succinate (TOPROL-XL) 25 mg XL tablet metoprolol succinate ER 25 mg tablet,extended release 24 hr No current facility-administered medications on file prior to visit. Vitals:    03/04/22 0937 03/04/22 1204   BP: (!) 117/53 118/70   Pulse: (!) 56    Resp: 18    Temp: 98.1 °F (36.7 °C)    TempSrc: Oral    SpO2: 98%    Weight: 215 lb (97.5 kg)    Height: 5' 2\" (1.575 m)    PainSc:   0 - No pain         Physical Exam  Constitutional:       General: She is not in acute distress. Appearance: Normal appearance. HENT:      Head: Normocephalic and atraumatic. Eyes:      Extraocular Movements: Extraocular movements intact. Conjunctiva/sclera: Conjunctivae normal.      Pupils: Pupils are equal, round, and reactive to light. Cardiovascular:      Rate and Rhythm: Regular rhythm. Bradycardia present. Pulses: Normal pulses. Heart sounds: Normal heart sounds. Pulmonary:      Effort: Pulmonary effort is normal. No respiratory distress. Breath sounds: Normal breath sounds. Abdominal:      General: Abdomen is flat. Palpations: Abdomen is soft. Tenderness: There is no abdominal tenderness. Musculoskeletal:      Right lower leg: No edema. Left lower leg: No edema. Neurological:      General: No focal deficit present. Mental Status: She is alert and oriented to person, place, and time. Psychiatric:         Mood and Affect: Mood normal.         Behavior: Behavior normal.         An electronic signature was used to authenticate this note.   -- Gwendolyn Cordon PA-C

## 2022-03-04 NOTE — PATIENT INSTRUCTIONS
Monitor pulse rate at home: If consistently <60, please call office or return for follow up appointment.   -If you feel short of breath, heart palpitations, fatigue, dizziness, chest pain or other sever symptoms please seek medical care.

## 2022-03-04 NOTE — PROGRESS NOTES
Chief Complaint   Patient presents with    Diabetes     2 week f/u    Labs     1. Have you been to the ER, urgent care clinic since your last visit? Hospitalized since your last visit? No    2. Have you seen or consulted any other health care providers outside of the [address] [place] [address] since your last visit? Include any pap smears or colon screening.  No

## 2022-03-04 NOTE — PROGRESS NOTES
Candida Bocanegra is a 59 y.o. female , id x 2(name and ). Reviewed record, history, and  medications. Chief Complaint   Patient presents with    Diabetes       Vitals:    22 0937   Height: 5' 2\" (1.575 m)       Coordination of Care Questionnaire:   1) Have you been to an emergency room, urgent care, or hospitalized since your last visit? {yes/no default no:45882::\"no\"}       2. Have seen or consulted any other health care provider since your last visit? {YES/NO:55539}      3 most recent PHQ Screens 3/4/2022   Little interest or pleasure in doing things Not at all   Feeling down, depressed, irritable, or hopeless Not at all   Total Score PHQ 2 0       Patient is accompanied by {:[id number]} I have received verbal consent from Candida Bocanegra to discuss any/all medical information while they are present in the room.

## 2022-03-05 LAB
25(OH)D3 SERPL-MCNC: 36.4 NG/ML (ref 30–100)
ANION GAP SERPL CALC-SCNC: 8 MMOL/L (ref 5–15)
BUN SERPL-MCNC: 14 MG/DL (ref 6–20)
BUN/CREAT SERPL: 22 (ref 12–20)
CALCIUM SERPL-MCNC: 10.1 MG/DL (ref 8.5–10.1)
CALCIUM SERPL-MCNC: 10.4 MG/DL (ref 8.5–10.1)
CHLORIDE SERPL-SCNC: 103 MMOL/L (ref 97–108)
CO2 SERPL-SCNC: 26 MMOL/L (ref 21–32)
CREAT SERPL-MCNC: 0.65 MG/DL (ref 0.55–1.02)
GLUCOSE SERPL-MCNC: 99 MG/DL (ref 65–100)
POTASSIUM SERPL-SCNC: 5.3 MMOL/L (ref 3.5–5.1)
PTH-INTACT SERPL-MCNC: 70.1 PG/ML (ref 18.4–88)
SODIUM SERPL-SCNC: 137 MMOL/L (ref 136–145)

## 2022-03-07 ENCOUNTER — TELEPHONE (OUTPATIENT)
Dept: FAMILY MEDICINE CLINIC | Age: 62
End: 2022-03-07

## 2022-03-07 NOTE — TELEPHONE ENCOUNTER
I called and spoke with patient. BP last 3 days 115/64, 117/64, 117/63. HR ranged 58 to 66. She forgot to take her metoprolol, losartan, and lasix for last 3 days. Denies chest pain, SOB, irregular heartbeat, palpitations, confusion. 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 Advised to not take metoprolol this week, cut lasix and losartan in half. Monitor BP and HR. Call if >100 or <60 HR, BP >140/90 or <100/60. Monitor for new symptoms (fluid retention, SOB, chest pain, palpitations). Call office or seek medical care for new symptoms. Return to office in 1 week for blood pressure check as well as recheck potassium (see lab note).

## 2022-03-07 NOTE — PROGRESS NOTES
Refer endocrine for elevated calcium and PTH high end of normal. Likely primary hyperparathyroidism or familial hypercalciuric hypercalcemia. For potassium, cut losartan in half and eat low potassium diet x 1 week. Follow up in office next week for repeat check. Advised signs and symptoms to watch out for, patient voiced understanding.

## 2022-03-07 NOTE — TELEPHONE ENCOUNTER
Patient's blood pressure was 11/64 over the weekend and heart rate over the weekend- 62-66. Monday mornin/63/blood pressure and 56/heart rate. These were taken without medication.  Patient's phone: [phone]

## 2022-03-08 ENCOUNTER — OFFICE VISIT (OUTPATIENT)
Dept: ENDOCRINOLOGY | Age: 62
End: 2022-03-08
Payer: COMMERCIAL

## 2022-03-08 VITALS
DIASTOLIC BLOOD PRESSURE: 57 MMHG | RESPIRATION RATE: 18 BRPM | OXYGEN SATURATION: 95 % | SYSTOLIC BLOOD PRESSURE: 109 MMHG | HEART RATE: 65 BPM | BODY MASS INDEX: 39.2 KG/M2 | WEIGHT: 213 LBS | TEMPERATURE: 97.4 F | HEIGHT: 62 IN

## 2022-03-08 DIAGNOSIS — I95.9 HYPOTENSION, UNSPECIFIED HYPOTENSION TYPE: ICD-10-CM

## 2022-03-08 DIAGNOSIS — E83.52 HYPERCALCEMIA: Primary | ICD-10-CM

## 2022-03-08 PROCEDURE — 99204 OFFICE O/P NEW MOD 45 MIN: CPT | Performed by: INTERNAL MEDICINE

## 2022-03-08 NOTE — PATIENT INSTRUCTIONS
SPECIFIC INSTRUCTIONS BELOW     Hold back on lasix  For a week or so     Stay on cozaar 50 mg a day       Do not too much fluids       Balance fluids       -----------------------------------------------------------------------------------------      24 hour urine collection instructions,  Calcium  Sodium  And  Creatinine   On the day you plan to collect urine    1. Discard first urine sample soon after you get up    2. Start collecting urine samples in the container then on whole first day . Edin Barrientos ... 3. until the first sample next day is collected into the jar.            -------------PAY ATTENTION TO THESE GENERAL INSTRUCTIONS -----------------      - The medications prescribed at this visit will not be available at pharmacy until 6 pm       - YOUR MED LIST IS NOT UP TO DATE AS SOME CHANGES ARE BEING MADE AFTER THE VISIT - FOLLOW SPECIFIC INSTRUCTIONS  ABOVE     -ANY tests other than blood work, which you opt to do  outside the  [facility] imaging facilities, you are responsible for prior authorizations if  required    - [phone] [facility]- PLEASE IGNORE     Results     *Normal results will not be notified by a phone call starting January 1 2021   *If you have an upcoming visit, the results will be discussed at the visit   *Please sign up for MY CHART if you want access to your lab and test results  *Abnormal results which require immediate attention will be notified by phone call   *Abnormal results which do not require immediate assistance will be notified in 1-2 weeks       Refills    -    have your pharmacy send us a refill request . Refills are done max for one year and a visit is a must before refills are extended    Follow up appointments -  highly encourage you to make it when you are checking out. We can accommodate you into the schedule based on your clinical situation, but not for extending refills beyond a year.  Labs are important to give refills and is important to get labs before the visit     Phone calls  -  Allow  24 hrs.  for non-urgent calls to be returned  Prior authorization - It may take 2-4 weeks to process  Forms  -  FMLA, DMV etc., will take up to 2 weeks to process  Cancellations - please notify the office 2 days in advance   Samples  - will only be dispensed at visits       If not showing for the appointments and cancelling appointments within 24 hours are kept track of and three  of such situations in  two consecutive years will likely be considered for termination from the practice    -------------------------------------------------------------------------------------------------------------------

## 2022-03-08 NOTE — PROGRESS NOTES
1. Have you been to the ER, urgent care clinic since your last visit? No  Hospitalized since your last visit? No    2. Have you seen or consulted any other health care providers outside of the [address] [place] [address] since your last visit? Include any pap smears or colon screening.  No     Wt Readings from Last 3 Encounters:   03/08/22 213 lb (96.6 kg)   03/04/22 215 lb (97.5 kg)   02/28/22 217 lb (98.4 kg)     Temp Readings from Last 3 Encounters:   03/08/22 97.4 °F (36.3 °C) (Temporal)   03/04/22 98.1 °F (36.7 °C) (Oral)   02/10/22 98 °F (36.7 °C) (Oral)     BP Readings from Last 3 Encounters:   03/08/22 (!) 109/57   03/04/22 118/70   02/28/22 120/74     Pulse Readings from Last 3 Encounters:   03/08/22 65   03/04/22 (!) 56   02/28/22 67

## 2022-03-08 NOTE — LETTER
3/13/2022    Patient: Case Bolaños   YOB: 1960   Date of Visit: 3/8/2022     J Luis Santos PA-C  [facility] [address]    Dear J Luis Santos PA-C,      Thank you for referring Ms. Case Bolaños to [address] [place] [address] for evaluation. My notes for this consultation are attached. If you have questions, please do not hesitate to call me. I look forward to following your patient along with you.       Sincerely,    Airam Scott MD

## 2022-03-08 NOTE — PROGRESS NOTES
HISTORY OF PRESENT ILLNESS  Alok Beaulieu is a 59 y.o. female. HPI  Initial visit for hypercalcemia      The patient has  asymptomatic elevation of the serum and calcium and parathormone. She has not  been taking medication : thiazide diuretic   She has not had previous history of head or neck radiation. She does not have familial history of endocrine malignancies. Patient reports being dizzy occasionally and her BP today is on lower side         Past Medical History:   Diagnosis Date    Arthritis     [facility]     Diabetes [facility]     Hypertension     Left renal mass     Renal cell cancer, left (HCC)     Clear cell renal cell carcinoma. Social History     Socioeconomic History    Marital status:      Spouse name: Not on file    Number of children: Not on file    Years of education: Not on file    Highest education level: Not on file   Occupational History    Not on file   Tobacco Use    Smoking status: Never Smoker    Smokeless tobacco: Never Used   Vaping Use    Vaping Use: Never used   Substance and Sexual Activity    Alcohol use: Yes     Comment: social    Drug use: Never    Sexual activity: Not on file   Other Topics Concern    Not on file   Social History Narrative    Not on file     Social Determinants of Health     Financial Resource Strain:     Difficulty of Paying Living Expenses: Not on file   Food Insecurity:     Worried About Running Out of Food in the Last Year: Not on file    Chaparrita of Food in the Last Year: Not on file   Transportation Needs:     Lack of Transportation (Medical): Not on file    Lack of Transportation (Non-Medical):  Not on file   Physical Activity: Insufficiently Active    Days of Exercise per Week: 2 days    Minutes of Exercise per Session: 30 min   Stress:     Feeling of Stress : Not on file   Social Connections:     Frequency of Communication with Friends and Family: Not on file    Frequency of Social Gatherings with Friends and Family: Not on file    Attends Denominational Services: Not on file    Active Member of Clubs or Organizations: Not on file    Attends Club or Organization Meetings: Not on file    Marital Status: Not on file   Intimate Partner Violence: Not At Risk    Fear of Current or Ex-Partner: No    Emotionally Abused: No    Physically Abused: No    Sexually Abused: No   Housing Stability:     Unable to Pay for Housing in the Last Year: Not on file    Number of [place] in the Last Year: Not on file    Unstable Housing in the Last Year: Not on file       Family History   Problem Relation Age of Onset    Lung Cancer Father     Heart Disease Maternal Grandmother     Heart Disease Mother     Breast Cancer Paternal Aunt          Review of Systems   Constitutional: Negative. HENT: Negative. Eyes: Negative for pain and redness. Respiratory: Negative. Cardiovascular: Negative for chest pain, palpitations and leg swelling. Gastrointestinal: Negative. Negative for constipation. Genitourinary: Negative. Musculoskeletal: Negative for myalgias. Skin: Negative. Neurological: Negative. Endo/Heme/Allergies: Negative. Psychiatric/Behavioral: Negative for depression and memory loss. The patient does not have insomnia. Physical Exam  Constitutional:       Appearance: She is well-developed. HENT:      Head: Normocephalic. Eyes:      Conjunctiva/sclera: Conjunctivae normal.      Pupils: Pupils are equal, round, and reactive to light. Neck:      Thyroid: No thyromegaly. Vascular: No JVD. Trachea: No tracheal deviation. Cardiovascular:      Rate and Rhythm: Normal rate and regular rhythm. Heart sounds: Normal heart sounds. No murmur heard. Pulmonary:      Breath sounds: Normal breath sounds. Abdominal:      General: Bowel sounds are normal.      Palpations: Abdomen is soft. Musculoskeletal:         General: Normal range of motion. Cervical back: Normal range of motion and neck supple. Lymphadenopathy:      Cervical: No cervical adenopathy. Skin:     General: Skin is warm. Neurological:      Mental Status: She is alert and oriented to person, place, and time. Deep Tendon Reflexes: Reflexes are normal and symmetric. ASSESSMENT and PLAN    1. Hypercalcemia  : Patient has moderate range of hypercalcemia     Likely  -  primary hyperparathyroidism     Will order repeat labs   Will order parathyroid scan, 24 hr urine for calcium, sodium , creatinine  and bone DEXA   As  most patients do not require aggressive intervention unless calcium levels are high  We opt for watchful observation    In some patients who get localized with parathyroid adenoma and if they satisfy surgical criterion we recommend surgical intervention  Patients can continue on taking low dose calcium supplements despite the hypecalcemia         2. Hypotension  - first is to hold off lasix   Definitely advised to balance fluids by not consuming too much     Lasix helps to lower calcium but not at expense of hypotension . Will resume It at later time       3. Hypotension  -   Lasting longer    - pcp stopped metoprolol, lowered the lasix dose  On march 4th  And now I am stopping lasix   Patient is now only on cozaar   Asked patient to follow up with Dr. Maria Isabel Mireles to have the BP  meds readjusted       Reviewed results with patient and discussed the labs being ordered today/bnv  Patient voiced understanding of plan of care       2.   Has h/o CHF - following Dr. Maria Isabel Mireles

## 2022-03-13 LAB
CALCIUM 24H UR-MCNC: 1.5 MG/DL
CALCIUM 24H UR-MRATE: 27 MG/24 HR (ref 0–320)
CREAT 24H UR-MRATE: 1015 MG/24 HR (ref 800–1800)
CREAT UR-MCNC: 55.6 MG/DL
SODIUM 24H UR-SCNC: 49 MMOL/L
SODIUM 24H UR-SRATE: 89 MMOL/24 HR (ref 39–258)

## 2022-03-17 ENCOUNTER — TRANSCRIBE ORDER (OUTPATIENT)
Dept: EMERGENCY DEPT | Age: 62
End: 2022-03-17

## 2022-03-17 ENCOUNTER — HOSPITAL ENCOUNTER (OUTPATIENT)
Dept: GENERAL RADIOLOGY | Age: 62
Discharge: HOME | End: 2022-03-17
Payer: COMMERCIAL

## 2022-03-17 ENCOUNTER — OFFICE VISIT (OUTPATIENT)
Dept: FAMILY MEDICINE CLINIC | Age: 62
End: 2022-03-17
Payer: COMMERCIAL

## 2022-03-17 VITALS
BODY MASS INDEX: 40.48 KG/M2 | RESPIRATION RATE: 14 BRPM | DIASTOLIC BLOOD PRESSURE: 76 MMHG | TEMPERATURE: 98.4 F | WEIGHT: 220 LBS | HEIGHT: 62 IN | OXYGEN SATURATION: 100 % | HEART RATE: 59 BPM | SYSTOLIC BLOOD PRESSURE: 117 MMHG

## 2022-03-17 DIAGNOSIS — I50.9 CONGESTIVE HEART FAILURE, UNSPECIFIED HF CHRONICITY, UNSPECIFIED HEART FAILURE TYPE (HCC): Primary | ICD-10-CM

## 2022-03-17 DIAGNOSIS — J30.1 SEASONAL ALLERGIC RHINITIS DUE TO POLLEN: ICD-10-CM

## 2022-03-17 DIAGNOSIS — R60.0 LOWER EXTREMITY EDEMA: ICD-10-CM

## 2022-03-17 DIAGNOSIS — I50.9 CONGESTIVE HEART FAILURE, UNSPECIFIED HF CHRONICITY, UNSPECIFIED HEART FAILURE TYPE (HCC): ICD-10-CM

## 2022-03-17 PROCEDURE — 99214 OFFICE O/P EST MOD 30 MIN: CPT | Performed by: FAMILY MEDICINE

## 2022-03-17 PROCEDURE — 71046 X-RAY EXAM CHEST 2 VIEWS: CPT

## 2022-03-17 RX ORDER — LORATADINE 10 MG/1
10 TABLET ORAL DAILY
Qty: 90 TABLET | Refills: 0 | Status: SHIPPED | OUTPATIENT
Start: 2022-03-17 | End: 2022-04-29

## 2022-03-17 NOTE — PATIENT INSTRUCTIONS
Take lasix 40mg in twice per day for next 3 days, then back down to 40mg daily. Monitor blood pressure and heart rate at least twice per day. Send me a [employer] message tomorrow OR call office and let me know how you are feeling     Follow up in Office Monday     If at any point you develop shortness of breath, chest pain, or other worsening symptoms please go to ED.

## 2022-03-17 NOTE — PROGRESS NOTES
Vic Sinha is a 59 y.o. female    Chief Complaint   Patient presents with    Follow-up       1. Have you been to the ER, urgent care clinic since your last visit? Hospitalized since your last visit? No    2. Have you seen or consulted any other health care providers outside of the [address] [place] [address] since your last visit? Include any pap smears or colon screening.  No

## 2022-03-18 ENCOUNTER — APPOINTMENT (OUTPATIENT)
Dept: CT IMAGING | Age: 62
End: 2022-03-18
Payer: COMMERCIAL

## 2022-03-18 ENCOUNTER — APPOINTMENT (OUTPATIENT)
Dept: VASCULAR SURGERY | Age: 62
End: 2022-03-18
Payer: COMMERCIAL

## 2022-03-18 ENCOUNTER — TELEPHONE (OUTPATIENT)
Dept: FAMILY MEDICINE CLINIC | Age: 62
End: 2022-03-18

## 2022-03-18 ENCOUNTER — HOSPITAL ENCOUNTER (EMERGENCY)
Age: 62
Discharge: HOME | End: 2022-03-18
Attending: EMERGENCY MEDICINE
Payer: COMMERCIAL

## 2022-03-18 ENCOUNTER — OFFICE VISIT (OUTPATIENT)
Dept: FAMILY MEDICINE CLINIC | Age: 62
End: 2022-03-18
Payer: COMMERCIAL

## 2022-03-18 VITALS
BODY MASS INDEX: 39.93 KG/M2 | TEMPERATURE: 98.5 F | HEIGHT: 62 IN | RESPIRATION RATE: 16 BRPM | OXYGEN SATURATION: 99 % | HEART RATE: 65 BPM | WEIGHT: 217 LBS | SYSTOLIC BLOOD PRESSURE: 105 MMHG | DIASTOLIC BLOOD PRESSURE: 69 MMHG

## 2022-03-18 VITALS
DIASTOLIC BLOOD PRESSURE: 56 MMHG | OXYGEN SATURATION: 98 % | SYSTOLIC BLOOD PRESSURE: 113 MMHG | WEIGHT: 213 LBS | HEART RATE: 63 BPM | TEMPERATURE: 98 F | RESPIRATION RATE: 18 BRPM | BODY MASS INDEX: 39.2 KG/M2 | HEIGHT: 62 IN

## 2022-03-18 DIAGNOSIS — R79.89 POSITIVE D DIMER: Primary | ICD-10-CM

## 2022-03-18 DIAGNOSIS — M79.604 LOWER EXTREMITY PAIN, BILATERAL: Primary | ICD-10-CM

## 2022-03-18 DIAGNOSIS — M79.605 LOWER EXTREMITY PAIN, BILATERAL: Primary | ICD-10-CM

## 2022-03-18 DIAGNOSIS — R60.0 LOWER EXTREMITY EDEMA: Primary | ICD-10-CM

## 2022-03-18 DIAGNOSIS — R06.02 SOB (SHORTNESS OF BREATH): ICD-10-CM

## 2022-03-18 DIAGNOSIS — R06.02 SHORTNESS OF BREATH: ICD-10-CM

## 2022-03-18 PROBLEM — E11.9 CONTROLLED TYPE 2 DIABETES MELLITUS WITHOUT COMPLICATION, WITHOUT LONG-TERM CURRENT USE OF INSULIN (HCC): Status: ACTIVE | Noted: 2020-06-22

## 2022-03-18 PROBLEM — N28.89 RENAL MASS: Status: ACTIVE | Noted: 2020-09-09

## 2022-03-18 LAB
BUN SERPL-MCNC: 13 MG/DL (ref 8–27)
BUN/CREAT SERPL: 19 (ref 12–28)
CALCIUM SERPL-MCNC: 10.3 MG/DL (ref 8.7–10.3)
CHLORIDE SERPL-SCNC: 101 MMOL/L (ref 96–106)
CO2 SERPL-SCNC: 25 MMOL/L (ref 20–29)
CREAT SERPL-MCNC: 0.68 MG/DL (ref 0.57–1)
EGFR: 98 ML/MIN/1.73
GLUCOSE SERPL-MCNC: 103 MG/DL (ref 65–99)
INTERPRETATION: NORMAL
NT-PROBNP SERPL-MCNC: 75 PG/ML (ref 0–287)
POTASSIUM SERPL-SCNC: 4.7 MMOL/L (ref 3.5–5.2)
SODIUM SERPL-SCNC: 139 MMOL/L (ref 134–144)

## 2022-03-18 PROCEDURE — 74011000636 HC RX REV CODE- 636: Performed by: RADIOLOGY

## 2022-03-18 PROCEDURE — 93970 EXTREMITY STUDY: CPT

## 2022-03-18 PROCEDURE — 99285 EMERGENCY DEPT VISIT HI MDM: CPT

## 2022-03-18 PROCEDURE — 71275 CT ANGIOGRAPHY CHEST: CPT

## 2022-03-18 PROCEDURE — 99213 OFFICE O/P EST LOW 20 MIN: CPT | Performed by: FAMILY MEDICINE

## 2022-03-18 RX ADMIN — IOPAMIDOL 70 ML: 755 INJECTION, SOLUTION INTRAVENOUS at 22:03

## 2022-03-18 NOTE — TELEPHONE ENCOUNTER
I called and spoke with patient this afternoon to let her know of ultrasound appointment. Patient states she has no chest pain but today sensation of chest heaviness has persisted-- states she felt \"short of breath\" but that it was more she could not take a full deep breath in. Plan for patient to come by office for stat d-dimer this afternoon for evaluation of PE with low Wells PE score. However as patient is 59 so PERC cannot be applied. As CXR, BNP were negative, less likely chest heaviness is from CHF. Plan for stat d-dimer, if positive plan for patient to have ED evaluation for possible DVT/PE. Advised patient if at any point her symptoms worsen to proceed straight to ED.

## 2022-03-18 NOTE — PROGRESS NOTES
I called and spoke with patient-- chest x ray showed no acute cardiopulmonary disease. Per Dr. Randy Zamora, stat labs BNP and BMP were called to him and were both within normal limits last night. Scheduled patient for lower extremity duplex ultrasound this afternoon at 3pm at [address] [place] [address] to eval for venous insufficiency as well as eval for DVT. I called patient-- she denies shortness of breath, chest pain. Notes slight runny nose and itchy eyes when she goes outside with allergies. Lower extremity edema has improved compared to yesterday with taking lasix 40mg last night  Advised to take 40mg lasix BID until back to baseline weight (215lbs). She has not taken her vitals this AM. Again advised strict ED precautions for shortness of breath, chest pain or other severe or worsening symptoms. Patient voiced understanding.

## 2022-03-18 NOTE — PROGRESS NOTES
Ayanna Munoz is a 59 y.o. female    Chief Complaint   Patient presents with    Follow-up     1. Have you been to the ER, urgent care clinic since your last visit? Hospitalized since your last visit? No    2. Have you seen or consulted any other health care providers outside of the [address] [place] [address] since your last visit? Include any pap smears or colon screening.  No

## 2022-03-18 NOTE — ED TRIAGE NOTES
Pt. States leg swelling over the past 5 days, with 7 pound weight gain, states started taking her lasix again 20 mg, states labs drawn at PCP D-dimer was elevated.

## 2022-03-18 NOTE — PROGRESS NOTES
Carolynne Klinefelter (: 1960) is a 59 y.o. female, established patient, here for evaluation of the following chief complaint(s):  Follow-up         ASSESSMENT/PLAN:  Below is the assessment and plan developed based on review of pertinent history, physical exam, labs, studies, and medications. 1. Lower extremity edema  2. Shortness of breath  CXR, BNP and BMP yesterday no acute cardiopulmonary process. Sx have improved with lasix 40mg last night and this AM.  Vitals normal in office. With shortness of breath on exertion last night and lower extremity edema, proceed with STAT D-Dimer for evaluation of PE or DVT. Wells criteria low however cannot rule out with PERC due to patient's age.   -On call provider (Dr. Lea Chance) informed of stat lab order. If d-dimer positive, plan to send to ED. Advised if symptoms worsen at any point to go to ED. If d-dimer negative, advised patient to continue 40mg lasix BID until weight returns to baseline 115lbs and then reduce to 40mg daily. Monitor vitals daily. If BP <100/60 or HR <60 call office.   -Patient has follow up Monday in office. If Borderline blood pressure can reduce losartan. -     D DIMER; Future      SUBJECTIVE/OBJECTIVE:  Chief Complaint   Patient presents with    Follow-up     Patient presents back to office today for follow up and additional labs. Patient took 40mg lasix last night and this AM and notes she has already lost 2 to 3 lbs since yesterday. Notes lower extremity edema has been improving. When patient was asked further about shortness of breath she notes last night she felt short of breath when walking up stairs in her house-- felt winded. Notes today her chest feels heavy, feels she cannot take a deep breath in. No shortness of breath at rest. No chest pain. No abdominal pain, reports she is not lightheaded, dizzy or fatigued today. CXR yesterday showed no acute cardiopulmonary process.  Per Dr. Lea Chance on call physician for group this week BMP and BNP were called to him and were within normal limits. Review of systems negative other than mentioned in HPI    Current Outpatient Medications on File Prior to Visit   Medication Sig Dispense Refill    loratadine (Claritin) 10 mg tablet Take 1 Tablet by mouth daily for 90 days. 90 Tablet 0    TURMERIC, BULK, by Does Not Apply route.  ginger, Zingiber officinalis, (ginger extract) 250 mg cap Take  by mouth.  Apple Cider Vinegar 300 mg tab Take  by mouth.  furosemide (LASIX) 40 mg tablet Take 1 Tablet by mouth daily. (Patient taking differently: Take 20 mg by mouth daily.) 90 Tablet 0    metFORMIN (GLUCOPHAGE) 500 mg tablet Take 2 Tablets by mouth two (2) times daily (with meals). 120 Tablet 2    losartan (COZAAR) 100 mg tablet Take 1 Tablet by mouth daily. (Patient taking differently: Take 50 mg by mouth daily.) 90 Tablet 0    acetaminophen (Tylenol Extra Strength) 500 mg tablet Take  by mouth every six (6) hours as needed for Pain.  atorvastatin (LIPITOR) 40 mg tablet TK 1 T PO QD HS       No current facility-administered medications on file prior to visit. Vitals:    03/18/22 1331   BP: 105/69   Pulse: 65   Resp: 16   Temp: 98.5 °F (36.9 °C)   TempSrc: Oral   SpO2: 99%   Weight: 217 lb (98.4 kg)   Height: 5' 2\" (1.575 m)   PainSc:   0 - No pain        Physical Exam  Constitutional:       General: She is not in acute distress. Appearance: Normal appearance. She is not ill-appearing or toxic-appearing. HENT:      Head: Normocephalic and atraumatic. Cardiovascular:      Rate and Rhythm: Normal rate and regular rhythm. Pulses: Normal pulses. Heart sounds: Murmur (Murmur (8-4/3 systolic murmur sternal border. unchanged from previous exam) heard. No friction rub. No gallop. Pulmonary:      Effort: Pulmonary effort is normal. No respiratory distress. Breath sounds: Normal breath sounds. No stridor. No wheezing, rhonchi or rales.    Chest:      Chest wall: No tenderness. Abdominal:      General: Abdomen is flat. Bowel sounds are normal. There is no distension. Palpations: Abdomen is soft. There is no mass. Tenderness: There is no abdominal tenderness. Musculoskeletal:      Right lower leg: Edema present. Left lower leg: Edema present. Comments: Minimal edema ankles and shins bilaterally. Distal pulses 2+. Ankles, shins and calves are minimally tender to palpation bilaterally. Neurological:      General: No focal deficit present. Mental Status: She is alert and oriented to person, place, and time. An electronic signature was used to authenticate this note.   -- Heydi Medellin PA-C

## 2022-03-18 NOTE — ED PROVIDER NOTES
Date of Service:  3/18/2022    Patient:  Anh Sparrow    Chief Complaint:  Abnormal Lab Results and Leg Swelling       HPI:  Anh Sparrow is a 59 y.o.  female who presents for evaluation of bilateral lower extremity pain and swelling x5 days. Patient states 7 pound weight gain reported 5 days. Patient states that she began taking  20 mg Lasix. Patient also endorses shortness of breath x3 days. Patient states that she was seen by her PCP today where it was discovered that she had elevated D-dimer. His PCP advised her to follow-up at the emergency department for further evaluation treatment. Patient denies a history of blood clots. Patient denies use of blood thinners. Past Medical History:   Diagnosis Date    Arthritis     [facility]     Diabetes ([facility] [facility]     Hypertension     Left renal mass     Renal cell cancer, left (HCC)     Clear cell renal cell carcinoma.        Past Surgical History:   Procedure Laterality Date    HX HEART CATHETERIZATION      HX HYSTERECTOMY      HX NEPHRECTOMY  09/09/2020    Robotic assisted laparoscopic left partial nephrectomy    HX OTHER SURGICAL           Family History:   Problem Relation Age of Onset    Lung Cancer Father     Heart Disease Maternal Grandmother     Heart Disease Mother     Breast Cancer Paternal Aunt        Social History     Socioeconomic History    Marital status:      Spouse name: Not on file    Number of children: Not on file    Years of education: Not on file    Highest education level: Not on file   Occupational History    Not on file   Tobacco Use    Smoking status: Never Smoker    Smokeless tobacco: Never Used   Vaping Use    Vaping Use: Never used   Substance and Sexual Activity    Alcohol use: Yes     Comment: social    Drug use: Never    Sexual activity: Not on file   Other Topics Concern    Not on file   Social History Narrative    Not on file     Social Determinants of Health Financial Resource Strain:     Difficulty of Paying Living Expenses: Not on file   Food Insecurity:     Worried About Running Out of Food in the Last Year: Not on file    Chaparrita of Food in the Last Year: Not on file   Transportation Needs:     Lack of Transportation (Medical): Not on file    Lack of Transportation (Non-Medical): Not on file   Physical Activity: Insufficiently Active    Days of Exercise per Week: 2 days    Minutes of Exercise per Session: 30 min   Stress:     Feeling of Stress : Not on file   Social Connections:     Frequency of Communication with Friends and Family: Not on file    Frequency of Social Gatherings with Friends and Family: Not on file    Attends Roman [employer] Services: Not on file    Active Member of Clubs or Organizations: Not on file    Attends Club or Organization Meetings: Not on file    Marital Status: Not on file   Intimate Partner Violence: Not At Risk    Fear of Current or Ex-Partner: No    Emotionally Abused: No    Physically Abused: No    Sexually Abused: No   Housing Stability:     Unable to Pay for Housing in the Last Year: Not on file    Number of [place] in the Last Year: Not on file    Unstable Housing in the Last Year: Not on file         ALLERGIES: Cephalexin, Doxycycline, Hydrocodone-acetaminophen, Meperidine, Oxycodone-acetaminophen, Penicillins, Propoxyphene n-acetaminophen, Shellfish containing products, Sulfa (sulfonamide antibiotics), and Triamterene-hydrochlorothiazid    Review of Systems   Constitutional: Negative for chills and fever. Respiratory: Positive for shortness of breath. Cardiovascular: Negative for chest pain. Gastrointestinal: Negative for abdominal pain. Genitourinary: Negative for dysuria. Musculoskeletal: Negative for neck pain. Bilateral lower ext pain   Skin: Negative for rash. Allergic/Immunologic: Negative for immunocompromised state. Neurological: Negative for headaches. Psychiatric/Behavioral: Negative for agitation. All other systems reviewed and are negative. Vitals:    03/18/22 2217 03/18/22 2218 03/18/22 2219 03/18/22 2220   BP:       Pulse: 66 67 66 69   Resp: 19 15 16 21   Temp:       SpO2: 100% 96% 99% 99%   Weight:       Height:                Physical Exam  Vitals and nursing note reviewed. Constitutional:       General: She is not in acute distress. HENT:      Head: Atraumatic. Mouth/Throat:      Mouth: Mucous membranes are moist.   Eyes:      Conjunctiva/sclera: Conjunctivae normal.   Cardiovascular:      Rate and Rhythm: Normal rate and regular rhythm. Heart sounds: No murmur heard. Pulmonary:      Effort: Pulmonary effort is normal.   Abdominal:      Palpations: Abdomen is soft. Tenderness: There is no abdominal tenderness. Musculoskeletal:         General: Normal range of motion. Cervical back: Normal range of motion. Right lower leg: Swelling and tenderness present. Left lower leg: Swelling and tenderness present. Comments: Positive Homans' sign bilateral lower extremities. Skin:     General: Skin is warm. Neurological:      Mental Status: She is alert and oriented to person, place, and time. Mental status is at baseline. MDM       Procedures      VITAL SIGNS:  Patient Vitals for the past 4 hrs:   Temp Pulse Resp BP SpO2   03/18/22 2220 -- 69 21 -- 99 %   03/18/22 2219 -- 66 16 -- 99 %   03/18/22 2218 -- 67 15 -- 96 %   03/18/22 2217 -- 66 19 -- 100 %   03/18/22 2216 -- 66 19 -- 100 %   03/18/22 2215 -- 69 15 -- 100 %   03/18/22 2214 -- 71 19 -- 100 %   03/18/22 2213 -- 74 17 -- 100 %   03/18/22 2212 -- 76 18 -- 100 %   03/18/22 2211 -- 81 -- (!) 144/64 --   03/18/22 2000 98 °F (36.7 °C) 69 18 (!) 109/92 100 %   03/18/22 1956 -- 71 -- -- --         LABS:  No results found for this or any previous visit (from the past 6 hour(s)).      IMAGING:  CTA CHEST W OR W WO CONT   Final Result   There is no pulmonary embolism. There is no aortic aneurysm or dissection. No acute intrathoracic process is identified. Incidental findings are as   described above. DUPLEX LOWER EXT VENOUS BILAT    (Results Pending)         Medications During Visit:  Medications   iopamidoL (ISOVUE-370) 76 % injection 100 mL (70 mL IntraVENous Given 3/18/22 2203)         DECISION MAKING:  Richie Parrish is a 59 y.o. female who comes in as above. Duplex venous ultrasound of bilateral lower extremity showed no evidence of DVT. CTA of the chest showed no evidence of pulmonary embolism. Reviewed results with patient. Patient stable upon discharge. Reurn precautions given to patient. IMPRESSION:  1. Lower extremity pain, bilateral    2.  SOB (shortness of breath)        DISPOSITION:        Current Discharge Medication List           Follow-up Information     Follow up With Specialties Details Why Contact Info    Eduardo Briceno PA-C Physician Assistant Schedule an appointment as soon as possible for a visit   [address] [place] [id number]  [phone]      [facility] EMERGENCY DEPT Emergency Medicine  If symptoms worsen [address] [place] [address]  [phone]

## 2022-03-19 PROBLEM — Z90.5 HISTORY OF PARTIAL NEPHRECTOMY: Status: ACTIVE | Noted: 2020-09-30

## 2022-03-19 PROBLEM — I10 ESSENTIAL HYPERTENSION: Status: ACTIVE | Noted: 2017-04-13

## 2022-03-19 PROBLEM — C64.9 RENAL CELL CARCINOMA (HCC): Status: ACTIVE | Noted: 2020-08-14

## 2022-03-19 PROBLEM — E78.5 HYPERLIPIDEMIA: Status: ACTIVE | Noted: 2020-01-21

## 2022-03-19 PROBLEM — C64.2 RENAL CANCER, LEFT (HCC): Status: ACTIVE | Noted: 2020-09-14

## 2022-03-19 PROBLEM — Z88.0 ALLERGY TO PENICILLIN: Status: ACTIVE | Noted: 2017-04-13

## 2022-03-19 PROBLEM — E66.01 MORBID OBESITY (HCC): Status: ACTIVE | Noted: 2019-09-24

## 2022-03-20 PROBLEM — M17.9 OSTEOARTHRITIS OF KNEE: Status: ACTIVE | Noted: 2017-04-13

## 2022-03-20 PROBLEM — R94.39 ABNORMAL STRESS TEST: Status: ACTIVE | Noted: 2018-04-13

## 2022-03-20 PROBLEM — I50.9 CONGESTIVE HEART FAILURE (HCC): Status: ACTIVE | Noted: 2018-04-20

## 2022-03-20 PROBLEM — I50.32 CHRONIC DIASTOLIC CONGESTIVE HEART FAILURE (HCC): Status: ACTIVE | Noted: 2018-04-17

## 2022-03-20 PROBLEM — R51.9 GENERALIZED HEADACHE: Status: ACTIVE | Noted: 2017-04-13

## 2022-03-20 PROBLEM — R60.9 EDEMA: Status: ACTIVE | Noted: 2017-04-13

## 2022-03-20 PROBLEM — G47.00 INSOMNIA: Status: ACTIVE | Noted: 2020-05-13

## 2022-03-21 ENCOUNTER — ANCILLARY PROCEDURE (OUTPATIENT)
Dept: CARDIOLOGY CLINIC | Age: 62
End: 2022-03-21
Payer: COMMERCIAL

## 2022-03-21 ENCOUNTER — OFFICE VISIT (OUTPATIENT)
Dept: FAMILY MEDICINE CLINIC | Age: 62
End: 2022-03-21

## 2022-03-21 VITALS
HEIGHT: 62 IN | BODY MASS INDEX: 39.56 KG/M2 | WEIGHT: 215 LBS | DIASTOLIC BLOOD PRESSURE: 54 MMHG | SYSTOLIC BLOOD PRESSURE: 102 MMHG

## 2022-03-21 DIAGNOSIS — J30.1 SEASONAL ALLERGIC RHINITIS DUE TO POLLEN: ICD-10-CM

## 2022-03-21 DIAGNOSIS — R01.1 MURMUR: ICD-10-CM

## 2022-03-21 DIAGNOSIS — K76.9 HEPATIC LESION: ICD-10-CM

## 2022-03-21 DIAGNOSIS — I50.9 CONGESTIVE HEART FAILURE, UNSPECIFIED HF CHRONICITY, UNSPECIFIED HEART FAILURE TYPE (HCC): Primary | ICD-10-CM

## 2022-03-21 DIAGNOSIS — I10 ESSENTIAL HYPERTENSION: ICD-10-CM

## 2022-03-21 DIAGNOSIS — R45.89 DEPRESSED MOOD: ICD-10-CM

## 2022-03-21 DIAGNOSIS — R59.9 ENLARGED LYMPH NODE: ICD-10-CM

## 2022-03-21 DIAGNOSIS — I50.32 CHRONIC DIASTOLIC CONGESTIVE HEART FAILURE (HCC): ICD-10-CM

## 2022-03-21 DIAGNOSIS — M79.18 MUSCULOSKELETAL PAIN: ICD-10-CM

## 2022-03-21 DIAGNOSIS — G47.00 INSOMNIA, UNSPECIFIED TYPE: ICD-10-CM

## 2022-03-21 DIAGNOSIS — R91.1 PULMONARY NODULE, LEFT: ICD-10-CM

## 2022-03-21 LAB
ECHO AO ASC DIAM: 2.8 CM
ECHO AO ASCENDING AORTA INDEX: 1.42 CM/M2
ECHO AO ROOT DIAM: 2.9 CM
ECHO AO ROOT INDEX: 1.47 CM/M2
ECHO AV AREA PEAK VELOCITY: 2.1 CM2
ECHO AV AREA VTI: 2.4 CM2
ECHO AV AREA/BSA PEAK VELOCITY: 1.1 CM2/M2
ECHO AV AREA/BSA VTI: 1.2 CM2/M2
ECHO AV MEAN GRADIENT: 5 MMHG
ECHO AV MEAN VELOCITY: 1 M/S
ECHO AV PEAK GRADIENT: 10 MMHG
ECHO AV PEAK VELOCITY: 1.6 M/S
ECHO AV VELOCITY RATIO: 0.63
ECHO AV VTI: 29 CM
ECHO EST RA PRESSURE: 3 MMHG
ECHO LA DIAMETER INDEX: 1.83 CM/M2
ECHO LA DIAMETER: 3.6 CM
ECHO LA TO AORTIC ROOT RATIO: 1.24
ECHO LA VOL 2C: 69 ML (ref 22–52)
ECHO LA VOL 4C: 63 ML (ref 22–52)
ECHO LA VOLUME AREA LENGTH: 70 ML
ECHO LA VOLUME INDEX A2C: 35 ML/M2 (ref 16–34)
ECHO LA VOLUME INDEX A4C: 32 ML/M2 (ref 16–34)
ECHO LA VOLUME INDEX AREA LENGTH: 36 ML/M2 (ref 16–34)
ECHO LV E' LATERAL VELOCITY: 8 CM/S
ECHO LV E' SEPTAL VELOCITY: 7 CM/S
ECHO LV EDV A2C: 129 ML
ECHO LV EDV A4C: 120 ML
ECHO LV EDV BP: 126 ML (ref 56–104)
ECHO LV EDV INDEX A4C: 61 ML/M2
ECHO LV EDV INDEX BP: 64 ML/M2
ECHO LV EDV NDEX A2C: 65 ML/M2
ECHO LV EJECTION FRACTION A2C: 59 %
ECHO LV EJECTION FRACTION A4C: 67 %
ECHO LV EJECTION FRACTION BIPLANE: 63 % (ref 55–100)
ECHO LV ESV A2C: 52 ML
ECHO LV ESV A4C: 40 ML
ECHO LV ESV BP: 47 ML (ref 19–49)
ECHO LV ESV INDEX A2C: 26 ML/M2
ECHO LV ESV INDEX A4C: 20 ML/M2
ECHO LV ESV INDEX BP: 24 ML/M2
ECHO LV FRACTIONAL SHORTENING: 35 % (ref 28–44)
ECHO LV INTERNAL DIMENSION DIASTOLE INDEX: 2.44 CM/M2
ECHO LV INTERNAL DIMENSION DIASTOLIC: 4.8 CM (ref 3.9–5.3)
ECHO LV INTERNAL DIMENSION SYSTOLIC INDEX: 1.57 CM/M2
ECHO LV INTERNAL DIMENSION SYSTOLIC: 3.1 CM
ECHO LV IVSD: 0.7 CM (ref 0.6–0.9)
ECHO LV MASS 2D: 116.6 G (ref 67–162)
ECHO LV MASS INDEX 2D: 59.2 G/M2 (ref 43–95)
ECHO LV POSTERIOR WALL DIASTOLIC: 0.8 CM (ref 0.6–0.9)
ECHO LV RELATIVE WALL THICKNESS RATIO: 0.33
ECHO LVOT AREA: 3.5 CM2
ECHO LVOT AV VTI INDEX: 0.69
ECHO LVOT DIAM: 2.1 CM
ECHO LVOT MEAN GRADIENT: 2 MMHG
ECHO LVOT PEAK GRADIENT: 4 MMHG
ECHO LVOT PEAK VELOCITY: 1 M/S
ECHO LVOT STROKE VOLUME INDEX: 35.3 ML/M2
ECHO LVOT SV: 69.6 ML
ECHO LVOT VTI: 20.1 CM
ECHO MV A VELOCITY: 0.77 M/S
ECHO MV AREA PHT: 2.8 CM2
ECHO MV AREA VTI: 2.8 CM2
ECHO MV E DECELERATION TIME (DT): 267.5 MS
ECHO MV E VELOCITY: 0.6 M/S
ECHO MV E/A RATIO: 0.78
ECHO MV E/E' LATERAL: 7.5
ECHO MV E/E' RATIO (AVERAGED): 8.04
ECHO MV E/E' SEPTAL: 8.57
ECHO MV LVOT VTI INDEX: 1.25
ECHO MV MAX VELOCITY: 0.8 M/S
ECHO MV MEAN GRADIENT: 1 MMHG
ECHO MV MEAN VELOCITY: 0.4 M/S
ECHO MV PEAK GRADIENT: 3 MMHG
ECHO MV PRESSURE HALF TIME (PHT): 77.6 MS
ECHO MV VTI: 25.2 CM
ECHO RIGHT VENTRICULAR SYSTOLIC PRESSURE (RVSP): 32 MMHG
ECHO RV INTERNAL DIMENSION: 3.9 CM
ECHO RV TAPSE: 2.3 CM (ref 1.5–2)
ECHO TV REGURGITANT MAX VELOCITY: 2.67 M/S
ECHO TV REGURGITANT PEAK GRADIENT: 29 MMHG

## 2022-03-21 PROCEDURE — 99214 OFFICE O/P EST MOD 30 MIN: CPT | Performed by: FAMILY MEDICINE

## 2022-03-21 PROCEDURE — 93306 TTE W/DOPPLER COMPLETE: CPT | Performed by: SPECIALIST

## 2022-03-21 RX ORDER — LOSARTAN POTASSIUM 50 MG/1
50 TABLET ORAL DAILY
Qty: 30 TABLET | Refills: 2 | Status: SHIPPED | OUTPATIENT
Start: 2022-03-21 | End: 2022-06-27

## 2022-03-21 RX ORDER — MIRTAZAPINE 7.5 MG/1
7.5 TABLET, FILM COATED ORAL
Qty: 30 TABLET | Refills: 0 | Status: SHIPPED | OUTPATIENT
Start: 2022-03-21 | End: 2022-04-12 | Stop reason: SDUPTHER

## 2022-03-21 NOTE — PROGRESS NOTES
BMP and BNP within normal limits-- patient subsequently had d-dimer elevated and sent to ED for PE workup. See notes.

## 2022-03-21 NOTE — PROGRESS NOTES
Kilo Sorto (: 1960) is a 59 y.o. female, established patient, here for evaluation of the following chief complaint(s):  Follow-up         ASSESSMENT/PLAN:  Below is the assessment and plan developed based on review of pertinent history, physical exam, labs, studies, and medications. 1. Congestive heart failure, unspecified HF chronicity, unspecified heart failure type ([address]  Weight has returned to baseline-- can decrease lasix to 40mg daily rather than BID  -Has echo this afternoon   -Likely persistent SOB is due to chronic dyspnea on exertion as well as seasonal allergic rhinitis symptoms as CXR, BNP, CTA and lower extremity duplex all unrevealing/  -Patient has echo this afternoon with Dr. Gwendolyn Yun. Remain on losartan 50mg for HTN, not on metoprolol-- this was stopped 3 weeks ago by PCP due to bradycardia and hypotension. 2. Enlarged lymph node  Prominent lymph node noted in the right groin which measures 1.95 x 0.62 cm  No palpable lymph node on exam in office but patient reports this has been present for last 3 months. She has significant history for Renal Cell Carcinoma in .   -     REFERRAL TO HEMATOLOGY ONCOLOGY    3. Hepatic lesion  Right and left hepatic hypo densities noted incidentally on CTA chest 3/18/2022 likely cysts-- obtain RUQ ultrasound for further evaluation.    -     [employer]; Future    4. Depressed mood  5. Insomnia, unspecified type  Trial mirtazapine-- patient only sleeping 3 hours per night which is likely causing significant daytime fatigue. Discussed potential side effects with patient. Start with low dose and can increase if needed. If causing significant fatigue the following morning stop medication and let PCP know. -     mirtazapine (REMERON) 7.5 mg tablet; Take 1 Tablet by mouth nightly., Normal, Disp-30 Tablet, R-0    6. Essential hypertension  BP appears low with automated cuff in office, however 130/84 with manual cuff. Home monitoring 110/60 to 130s/80s. Advised to continue home monitoring with goal 100/60 to 130/80. Continue losartan 50mg and lasix 40mg daily. Call office if BP outside goal. Will also f/u with cardiologist Dr. Ken Polanco in 2 weeks. -     losartan (COZAAR) 50 mg tablet; Take 1 Tablet by mouth daily. , Normal, Disp-30 Tablet, R-2    7. Seasonal allergic rhinitis due to pollen  Continue antihistamine     8. Musculoskeletal pain  Left rib pain likely MSK in nature after picking up grandson yesterday. Advised to ice, rest, and return to office if no improvement in next several days. 9. Pulmonary nodule, left  <3mm, seen on CTA 3/18/2022. Patient has history of renal cell carcinoma but does not smoke. She has been referred to Dr. Evert madden/onc, staff message sent to alert him of recent imaging findings. Return in about 4 weeks (around 4/18/2022) for sleep. SUBJECTIVE/OBJECTIVE:  Chief Complaint   Patient presents with    Follow-up     Patient presents to office to follow up shortness of breath and lower extremity edema. Patient went to ED 3/18/2022 after positive D-dimer and had lower extremity duplex bilaterally as well as CTA. These were negative for DVT or PE. Patient reports she has continued lasix 40mg BID for last 3 days and weight has returned to baseline. Lower extremity edema and pain has completely resolved. She continues to feel she cannot take a full deep breath along with rhinorrhea, itchy eyes and itchy nose. She has restarted antihistamine which is helping. Continues with mild dyspnea on exertion when walking up stairs-- patient reports this is unchanged from the last 6 months. CXR last week showed no acute cardiopulmonary process and BNP was npt elevated. Patient notes yesterday she picked up her grandchild and since then has muscular pain in left rib cage. Sharp pain when she takes a deep breath, abducts her left arm. Bra strap also hitting that area and causing pain.     Imaging at ED showed incidental enlarged R groin lymph node. Patient notes for last 3 months she has felt a lump in that area. Denies pain, rash, erythema, drainage. Denies any ingrown hairs in this area. Lump is not present today. CTA showed left pulmonary nodule <3mm in size. Patient is non smoker. Also showed hepatic steatosis with right and left hepatic hypodensities-- most likely cysts. Patient reports blood pressure at home ranging 110/60 to 130/80. She has been taking losartan 50mg as well as the lasix 40mg BID. Denies lightheadedness, dizziness, vision changes. Overall, patient reports she is very fatigued. Notes she lies awake at night before falling asleep, she is only sleeping 2 to 3 hours per night. She is working on sleep hygiene. She has tried melatonin. Benadryl caused her to feel groggy the entire next day. She was on ambien for years but stopped this due to concern for memory and always feeling groggy the next day. Denies snoring, waking at night SOB, orthopnea. She lies awake at night-- her mind does race. She is taking care of her  who had a stroke and feels worn down. Review of Systems   Constitutional: Positive for fatigue. Negative for fever and unexpected weight change. HENT: Positive for postnasal drip, rhinorrhea and sneezing. Negative for facial swelling, nosebleeds, sinus pressure, sinus pain, sore throat and trouble swallowing. Eyes: Negative for visual disturbance. Respiratory: Positive for cough. Negative for choking, chest tightness and wheezing. Cardiovascular: Negative for chest pain, palpitations and leg swelling. Gastrointestinal: Negative for abdominal distention, abdominal pain, constipation, diarrhea and vomiting. Genitourinary: Negative for dysuria and flank pain. Musculoskeletal: Negative for arthralgias. Neurological: Negative for speech difficulty, light-headedness and headaches.        Current Outpatient Medications on File Prior to Visit   Medication Sig Dispense Refill    loratadine (Claritin) 10 mg tablet Take 1 Tablet by mouth daily for 90 days. 90 Tablet 0    TURMERIC, BULK, by Does Not Apply route.  ginger, Zingiber officinalis, (ginger extract) 250 mg cap Take  by mouth.  Apple Cider Vinegar 300 mg tab Take  by mouth.  furosemide (LASIX) 40 mg tablet Take 1 Tablet by mouth daily. (Patient taking differently: Take 20 mg by mouth daily.) 90 Tablet 0    metFORMIN (GLUCOPHAGE) 500 mg tablet Take 2 Tablets by mouth two (2) times daily (with meals). 120 Tablet 2    acetaminophen (Tylenol Extra Strength) 500 mg tablet Take  by mouth every six (6) hours as needed for Pain.  atorvastatin (LIPITOR) 40 mg tablet TK 1 T PO QD HS       No current facility-administered medications on file prior to visit. Vitals:    03/21/22 1038 03/22/22 0934   BP: (!) 108/54 130/84   Pulse: 63    Resp: 18    Temp: 98.3 °F (36.8 °C)    TempSrc: Oral    SpO2: 94%    Weight: 215 lb (97.5 kg)    Height: 5' 2\" (1.575 m)    PainSc:   5    PainLoc: Abdomen         Physical Exam  Constitutional:       Appearance: Normal appearance. HENT:      Head: Normocephalic and atraumatic. Eyes:      Extraocular Movements: Extraocular movements intact. Conjunctiva/sclera: Conjunctivae normal.      Pupils: Pupils are equal, round, and reactive to light. Cardiovascular:      Rate and Rhythm: Normal rate and regular rhythm. Pulses: Normal pulses. Heart sounds: Normal heart sounds. Pulmonary:      Effort: Pulmonary effort is normal.      Breath sounds: Normal breath sounds. Chest:      Chest wall: No mass or swelling. Abdominal:      General: Abdomen is flat. Bowel sounds are normal.      Palpations: Abdomen is soft. Musculoskeletal:      Right lower leg: No edema. Left lower leg: No edema. Lymphadenopathy:      Head:      Right side of head: No submental, submandibular, tonsillar, preauricular, posterior auricular or occipital adenopathy.       Left side of head: No submental, submandibular, tonsillar, preauricular, posterior auricular or occipital adenopathy. Lower Body: No right inguinal (No lymphadenopathy noted on exam in region patient has felt the lump) adenopathy. No left inguinal adenopathy. Skin:     General: Skin is warm and dry. Capillary Refill: Capillary refill takes less than 2 seconds. Neurological:      General: No focal deficit present. Mental Status: She is alert and oriented to person, place, and time. Psychiatric:         Mood and Affect: Mood normal.         Behavior: Behavior normal.         An electronic signature was used to authenticate this note.   -- Willian Castaneda PA-C

## 2022-03-21 NOTE — PROGRESS NOTES
Chief Complaint   Patient presents with    Follow-up     Patient presents in office today for 1 week f/u. States that she has pain on her left side when she takes a deep breath. No other concerns. 1. Have you been to the ER, urgent care clinic since your last visit? Hospitalized since your last visit? Yes When: 3/18/22 Where: [facility] ED Reason for visit: SOB    2. Have you seen or consulted any other health care providers outside of the [address] [place] [address] since your last visit? Include any pap smears or colon screening.  No    Learning Assessment 3/21/2022   PRIMARY LEARNER Patient   PRIMARY LANGUAGE ENGLISH   LEARNER PREFERENCE PRIMARY LISTENING   ANSWERED BY self   RELATIONSHIP SELF

## 2022-03-21 NOTE — PATIENT INSTRUCTIONS
Losartan 50mg sent to pharmacy     Continue lasix 40mg daily     Continue monitoring blood pressure daily: Goal 100/60 to 130/90

## 2022-03-22 ENCOUNTER — TELEPHONE (OUTPATIENT)
Dept: FAMILY MEDICINE CLINIC | Age: 62
End: 2022-03-22

## 2022-03-22 VITALS
TEMPERATURE: 98.3 F | HEIGHT: 62 IN | RESPIRATION RATE: 18 BRPM | WEIGHT: 215 LBS | HEART RATE: 63 BPM | SYSTOLIC BLOOD PRESSURE: 130 MMHG | DIASTOLIC BLOOD PRESSURE: 84 MMHG | BODY MASS INDEX: 39.56 KG/M2 | OXYGEN SATURATION: 94 %

## 2022-03-22 NOTE — TELEPHONE ENCOUNTER
Please call patient back-- a potential side effect of Losartan is elevated potassium. However her potassium had returned to the normal range on her last labs. The elevated potassium a few weeks ago could also have been from a hemolyzed sample. I recommend she continue losartan 50mg.

## 2022-03-22 NOTE — TELEPHONE ENCOUNTER
----- Message from Meir Heller sent at 3/21/2022  5:37 PM EDT -----  Subject: Medication Problem    QUESTIONS  Name of Medication? losartan (COZAAR) 50 mg tablet  Patient-reported dosage and instructions? 50mg once daily  What question or problem do you have with the medication? Pt wanted to   know if this medication is what's causing her potassium levels to be high. Preferred Pharmacy? [id number] [place], [address] phone number (if available)? [phone]  Additional Information for Provider?   ---------------------------------------------------------------------------  --------------  [address]  What is the best way for the office to contact you? OK to leave message on   voicemail, OK to respond with electronic message via [employer] portal (only   for patients who have registered [employer] account)  Preferred Call Back Phone Number? [phone]  ---------------------------------------------------------------------------  --------------  SCRIPT ANSWERS  Relationship to Patient?  Self

## 2022-03-22 NOTE — TELEPHONE ENCOUNTER
Called and spoke with patient. Advised that this could be a side effect of the losartan however her K levels had normalized when they were rechecked. Advised that at this time Wander Coe would like for her to continue with the losartan. Patient verbalized understanding.

## 2022-03-23 NOTE — PROGRESS NOTES
Dear Ms. Humphrey,  Your echo shows essentially normal findings. Heart function is normal.    Please let me know if you have any questions.    Dr. Estela Spaulding [facility]

## 2022-03-24 ENCOUNTER — OFFICE VISIT (OUTPATIENT)
Dept: ONCOLOGY | Age: 62
End: 2022-03-24
Payer: COMMERCIAL

## 2022-03-24 ENCOUNTER — TELEPHONE (OUTPATIENT)
Dept: ONCOLOGY | Age: 62
End: 2022-03-24

## 2022-03-24 VITALS
DIASTOLIC BLOOD PRESSURE: 78 MMHG | HEART RATE: 76 BPM | RESPIRATION RATE: 16 BRPM | HEIGHT: 62 IN | OXYGEN SATURATION: 95 % | WEIGHT: 214.4 LBS | BODY MASS INDEX: 39.45 KG/M2 | SYSTOLIC BLOOD PRESSURE: 122 MMHG

## 2022-03-24 DIAGNOSIS — C64.2 RENAL CANCER, LEFT (HCC): ICD-10-CM

## 2022-03-24 DIAGNOSIS — R59.0 LYMPHADENOPATHY, INGUINAL: Primary | ICD-10-CM

## 2022-03-24 DIAGNOSIS — R79.89 ELEVATED D-DIMER: ICD-10-CM

## 2022-03-24 PROCEDURE — 99203 OFFICE O/P NEW LOW 30 MIN: CPT | Performed by: INTERNAL MEDICINE

## 2022-03-24 NOTE — Clinical Note
-Please request the records on her renal cancer from [address] [place] [address] (?[facility],

## 2022-03-24 NOTE — PROGRESS NOTES
Chief Complaint   Patient presents with    New Patient     Kermit Severe is a pleasant 59year old woman who presents as a new patient for enlarged lymph nodes.  She denies pain

## 2022-03-24 NOTE — PROGRESS NOTES
[facility] at [address], [facility] [address]  W: [phone]  F: [phone] Patient ID  Name: Jasson Smart  YOB: 1960  MRN: [id number]  Referring Provider:   Arleen Joel PA-C  [address]  [address] [place] [address]  Primary Care Provider:   Capri Shay       HEMATOLOGY/MEDICAL ONCOLOGY  NOTE   Date of Visit: 03/24/22  Reason for Evaluation:     Chief Complaint   Patient presents with    New Patient     Jasson Smart is a pleasant 59year old woman who presents as a new patient for enlarged lymph nodes. She denies pain     Subjective:     History of Present Illness:     Jasson Smart is a 59 y.o. F who presents for an initial evaluation for right groin adenopathy. She reportedly had treatment for Renal Cancer in [place]   This is a new problem. Problem has occurred for weeks. Problem has remained stable. Patient reports dealing with some fatigue. She notes that she has had allergies and she attributes her shortness of breath to this problem since her dyspnea has resolved. . Patient reports decreased oral intake due to a decreased appetite. She denies any unintentional weight loss. However, she has had some issues with weight gain when she was off lasix. Patient denies any bowel or bladder problems. She denies any fevers or chills. She denies hot flashes but has some drenching night sweats though are intermittent.  -She denies any mely adenopathy but states that she has felt something in her left neck with tenderness on palpation. She states that she is without any hemoptysis. She reports performing activities of daily living without restriction.  -  Past Medical History:   Diagnosis Date    Arthritis     [facility]     Diabetes [facility]     Hypertension     Left renal mass     Renal cell cancer, left (HCC)     Clear cell renal cell carcinoma.       Past Surgical History:   Procedure Laterality Date    HX HEART CATHETERIZATION      HX HYSTERECTOMY      HX NEPHRECTOMY  09/09/2020    Robotic assisted laparoscopic left partial nephrectomy    HX OTHER SURGICAL        Social History     Tobacco Use    Smoking status: Never Smoker    Smokeless tobacco: Never Used   Substance Use Topics    Alcohol use: Yes     Comment: social      Family History   Problem Relation Age of Onset    Lung Cancer Father     Heart Disease Maternal Grandmother     Heart Disease Mother     Breast Cancer Paternal Aunt      Current Outpatient Medications   Medication Sig    mirtazapine (REMERON) 7.5 mg tablet Take 1 Tablet by mouth nightly.  losartan (COZAAR) 50 mg tablet Take 1 Tablet by mouth daily.  loratadine (Claritin) 10 mg tablet Take 1 Tablet by mouth daily for 90 days.  TURMERIC, BULK, by Does Not Apply route.  ginger, Zingiber officinalis, (ginger extract) 250 mg cap Take  by mouth.  Apple Cider Vinegar 300 mg tab Take  by mouth.  furosemide (LASIX) 40 mg tablet Take 1 Tablet by mouth daily. (Patient taking differently: Take 20 mg by mouth daily.)    metFORMIN (GLUCOPHAGE) 500 mg tablet Take 2 Tablets by mouth two (2) times daily (with meals).  acetaminophen (Tylenol Extra Strength) 500 mg tablet Take  by mouth every six (6) hours as needed for Pain.  atorvastatin (LIPITOR) 40 mg tablet TK 1 T PO QD HS     No current facility-administered medications for this visit.       Allergies   Allergen Reactions    Cephalexin Rash    Doxycycline Nausea Only    Hydrocodone-Acetaminophen Itching    Meperidine Itching    Oxycodone-Acetaminophen Hives and Rash    Penicillins Hives and Itching    Propoxyphene N-Acetaminophen Hives and Rash    Shellfish Containing Products Other (comments) and Rash     Stomach pains      Sulfa (Sulfonamide Antibiotics) Itching    Triamterene-Hydrochlorothiazid Myalgia   -  Review of Systems provided by:patient  General: denies fever, denies chills, reports appetite loss and reports fatigue  Eyes: denies any acute vision loss and denies any eye pain  HEENT: denies epistaxis and denies trouble swallowing  Cardio: denies any chest pain and denies any leg swelling  Resp: denies any shortness of breath. denies any cough. denies any hemoptysis. Abdomen: denies any abdominal pain, denies any nausea, denies any vomiting, denies any diarrhea, denies any constipation, denies any bloody stools, denies any hematochezia and denies any melena  MSK: denies any myalgias, reports arthralgias with back issues. She has had some flank pain intermittently since restarting her lasix. Skin: denies any rash and denies any itching  Lymph: denies any lymph node enlargement and denies any lymph node tenderness  Neuro: denies any tremor, denies any imbalance and reports a history of headaches  : Denies any dysuria. Denies any hematuria. Psych: denies depression and denies anxiety    Objective:     Visit Vitals  /78   Pulse 76   Resp 16   Ht 5' 2\" (1.575 m)   Wt 214 lb 6.4 oz (97.3 kg)   SpO2 95%   BMI 39.21 kg/m²     ECOG PS: 1- Restricted in physically strenuous activity but ambulatory and able to carry out work of a light or sedentary nature, e.g., light house work, office work. Physical Exam  Constitutional: No acute distress. , Non-toxic appearance. and Non-diaphoretic. HENT: Normocephalic and atraumatic head. Eyes: Normal Conjunctivae. Anicteric sclerae. Cardiovascular: S1,S2 auscultated. No pitting edema. No friction rub. Pulmonary: Normal Respiratory Effort. No wheezing. No rhonchi. No rales. Abdominal: Normal bowel sounds. Soft Abdomen to palpation. No abdominal tenderness. No rebound tenderness. Skin: No rash. Musculoskeletal: No muscle pain on palpation. No temporal muscle wasting on inspection. Neurological: Alert and oriented. No tremor on inspection. Psychiatric: mood normal. normal speech rate normal affect    Results:    I personally reviewed pertinent labs/results:   I personally reviewed Epic EHR labs/results below:   Lab Results   Component Value Date/Time    WBC 5.3 02/10/2022 09:25 AM    HGB 11.7 02/10/2022 09:25 AM    HCT 37.7 02/10/2022 09:25 AM    PLATELET 374 59/29/8842 09:25 AM    MCV 95.0 02/10/2022 09:25 AM    ABS. NEUTROPHILS 2.7 02/10/2022 09:25 AM     Lab Results   Component Value Date/Time    Sodium 139 03/17/2022 12:00 AM    Potassium 4.7 03/17/2022 12:00 AM    Chloride 101 03/17/2022 12:00 AM    CO2 25 03/17/2022 12:00 AM    Glucose 103 (H) 03/17/2022 12:00 AM    BUN 13 03/17/2022 12:00 AM    Creatinine 0.68 03/17/2022 12:00 AM    GFR est AA >60 03/04/2022 10:16 AM    GFR est non-AA >60 03/04/2022 10:16 AM    Calcium 10.3 03/17/2022 12:00 AM     Lab Results   Component Value Date/Time    Bilirubin, total 0.8 02/10/2022 09:25 AM    ALT (SGPT) 23 02/10/2022 09:25 AM    Alk. phosphatase 77 02/10/2022 09:25 AM    Protein, total 7.3 02/10/2022 09:25 AM    Albumin 3.5 02/10/2022 09:25 AM    Globulin 3.8 02/10/2022 09:25 AM     XR CHEST PA LAT    Result Date: 3/17/2022  Indication: Congestive heart failure. Exam: PA and lateral views of the chest. There is no prior study for direct comparison. Findings: Cardiomediastinal silhouette is within normal limits. Lungs are clear bilaterally. Pleural spaces are normal. Osseous structures are intact. No acute cardiopulmonary disease. CTA CHEST W OR W WO CONT    Result Date: 3/18/2022  Clinical history: elevated d-dimer INDICATION:   elevated d-dimer COMPARISON: None CONTRAST: 100 ml Isovue 370 TECHNIQUE: CT of the chest with  IV contrast , Isovue-370 is performed. Axial images from the thoracic inlet to the level of the upper abdomen are obtained. Manual post-processing of the images and coronal reformatting is also performed.  CT dose reduction was achieved through use of a standardized protocol tailored for this examination and automatic exposure control for dose modulation. Multiplanar reformatted imaging was performed. Sagittal and coronal reformatting. 3-D Postprocessing of imaging was performed. 3-D MIP reconstructed images were obtained in the coronal plane. FINDINGS: There is no pulmonary embolism. There is no aortic aneurysm or dissection. Hepatic steatosis. Right hepatic hypodensity and left hepatic hypodensities are most likely simple cysts. Pulmonary nodule on the left measures less than 3 mm in size. There is no pleural or pericardial effusion. There is no mediastinal, axillary or hilar lymphadenopathy. The aorta is normal in course and caliber. The proximal pulmonary arteries are without evidence of filling defects. No lytic or blastic lesions are identified. The remainder of the upper abdomen visualized is unremarkable. There is no pulmonary embolism. There is no aortic aneurysm or dissection. No acute intrathoracic process is identified. Incidental findings are as described above. US EXT NONVAS LT LTD    Result Date: 2/16/2022  EXAM:  US EXT NONVAS LT LTD INDICATION: Epitrochlear lymphadenopathy, left arm pain COMPARISON: None. TECHNIQUE: Grayscale and color Doppler ultrasound images of the left arm were performed. FINDINGS: In the area of clinical concern, there is no fluid collection, lymphadenopathy, or evidence of infection. A few lobules of subcutaneous fat have been measured and may represent lipomas or simply lobulation of the subcutaneous fat. No lymphadenopathy in the arm. No fluid collection. Nodules that are being felt are likely lobules of fat or lipomas, regardless they appear benign. SHAYE 3D CHRISTIN W MAMMO BI SCREENING INCL CAD    Result Date: 1/24/2022  BILATERAL SCREENING DIGITAL TOMOSYNTHESIS MAMMOGRAM: COMPARISON: Comparison is made to the previous digital studies from [facility] dated 2015, 2016, 2017, 2019, 2020 and 2021 HISTORY: No family history NCI lifetime breast cancer risk 6.34%.  Zenia Aldridge 5 year breast cancer risk1.58%. TECHNIQUE:  Computer Aided Detection (CAD) was used in evaluating this mammogram.  FINDINGS: Bilateral 3D tomosynthesis with C-View MLO and CC imaging performed. The breast parenchyma has scattered fibroglandular densities. No suspicious calcifications, suspicious masses, architectural distortion, skin thickening, or evident axillary lymphadenopathy. No mammographic evidence of malignancy. RESULT CODE:  ACR BI-RADS Category 2, benign. FOLLOWUP CODE: 1, 1 year (annual) mammogram followup According to the [address], yearly mammograms are recommended starting at age 36 and continuing, as long as, a woman is in good health. Clinical breast exams should be part of a periodic health exam--about every 3 years for women in their 19's and 29's and every year for women 40 and over. Breast self-exam is an option for women starting in their 20's. Any breast change noted on a breast self-exam should be reported promptly to the patient's healthcare provider. Breast MRI is recommended for women with an approximately 20-25% or greater lifetime risk of breast cancer, including women with a strong family history of breast or ovarian cancer and women who have been treated for Hodgkin's disease. A negative Mammography report should not discourage follow up or biopsy of a clinically significant finding and/or abnormality. Dense breast tissue may obscure small neoplasms. ECHO ADULT COMPLETE    Result Date: 3/21/2022    Left Ventricle: Left ventricle size is normal. Normal wall thickness. Normal wall motion. Normal left ventricular systolic function. EF by 2D Simpsons Biplane is 63%. Normal diastolic function for age.   Mitral Valve: Mildly thickened leaflet. Mild annular calcification of the mitral valve. DUPLEX LOWER EXT VENOUS BILAT    Result Date: 3/19/2022  Bilateral lower extremity venous duplex negative for deep venous thrombosis or thrombophlebitis.  INCIDENTAL FINDINGS: Prominent lymph node noted in the right groin which measures 1.95 x 0.62 cm. Assessment and Recommendations:     1. Lymphadenopathy, inguinal  -Lymph node detected on ultrasound incidentally. We discussed that this is only mildly elevated in size. Discussed that could be reactive and repeat imaging with pelvic ultrasound would be indicated prior to no tissue biopsy. - US PELV NON OBS; Future    2. Renal cancer, left [facility])  -Patient with a history of nephrectomy. We will request the records to report. We discussed that if she would like us to follow order her surveillance that we can try to arrange this and have her follow-up in our clinic. She was following with urology in the past but has not had any follow-up since moving to the [facility] area. 3. Elevated d-dimer  -Discussed with patient apparently her D-dimer was slightly elevated although there are no clear records of this. Will correspond with her referring provider to determine if there is a result available. Follow-up and Dispositions    · Return in about 4 weeks (around 4/21/2022).   Routing History         Logan Gomez MD  Hematology/Medical Oncology Provider  [facility]ay  P: [phone]        Signed By:   Elissa Fraga MD

## 2022-03-24 NOTE — TELEPHONE ENCOUNTER
Patient called and stated that she was calling to leave information for her doctor at [facility].  Please advise     Dr. Mace   Phone#  [phone]

## 2022-03-24 NOTE — TELEPHONE ENCOUNTER
[address] at [facility]  [phone]    03/24/22 4:23 PM - Renal cancer records have been requested.

## 2022-03-24 NOTE — LETTER
3/27/2022    Patient: Joaquin Valerio   YOB: 1960   Date of Visit: 3/24/2022     Mikhail Valverde PA-C  [facility] [address]    Dear Mikhail Valverde PA-C,      Thank you for referring Ms. Joaquin Valerio to [address] [place] [address] for evaluation. My notes for this consultation are attached. If you have questions, please do not hesitate to call me. I look forward to following your patient along with you.       Sincerely,    Janis Draper MD

## 2022-03-25 ENCOUNTER — HOSPITAL ENCOUNTER (OUTPATIENT)
Dept: ULTRASOUND IMAGING | Age: 62
End: 2022-03-25
Attending: FAMILY MEDICINE

## 2022-03-25 ENCOUNTER — TRANSCRIBE ORDER (OUTPATIENT)
Dept: SCHEDULING | Age: 62
End: 2022-03-25

## 2022-03-25 ENCOUNTER — TELEPHONE (OUTPATIENT)
Dept: FAMILY MEDICINE CLINIC | Age: 62
End: 2022-03-25

## 2022-03-25 DIAGNOSIS — R59.0 LYMPHADENOPATHY OF LEFT CERVICAL REGION: Primary | ICD-10-CM

## 2022-03-25 NOTE — TELEPHONE ENCOUNTER
I spoke with patient 3/25/2022. Patient requests holding off on RUQ ultrasound to prioritize imaging for groin lymph node as ordered by Dr. Anna Marie Beauchamp as well as imaging of thyroid as ordered by endocrine. Patient has a lot going on and multiple family members to help care for. -CTA 1 week ago showed hepatic steatosis with right and left liver hypo densities most likely simple cysts. I think it is reasonable to wait on further RUQ imaging while patient undergoing alternate workup as listed above. RUQ ultrasound was canceled for this AM. Plan to reassess at 4 week follow up visit.

## 2022-03-29 ENCOUNTER — HOSPITAL ENCOUNTER (OUTPATIENT)
Dept: NUCLEAR MEDICINE | Age: 62
Discharge: HOME | End: 2022-03-29
Attending: INTERNAL MEDICINE
Payer: COMMERCIAL

## 2022-03-29 ENCOUNTER — HOSPITAL ENCOUNTER (OUTPATIENT)
Dept: ULTRASOUND IMAGING | Age: 62
Discharge: HOME | End: 2022-03-29
Attending: INTERNAL MEDICINE
Payer: COMMERCIAL

## 2022-03-29 DIAGNOSIS — E83.52 HYPERCALCEMIA: ICD-10-CM

## 2022-03-29 PROCEDURE — 78070 PARATHYROID PLANAR IMAGING: CPT

## 2022-03-29 PROCEDURE — 76536 US EXAM OF HEAD AND NECK: CPT

## 2022-03-29 RX ORDER — TETRAKIS(2-METHOXYISOBUTYLISOCYANIDE)COPPER(I) TETRAFLUOROBORATE 1 MG/ML
20.7 INJECTION, POWDER, LYOPHILIZED, FOR SOLUTION INTRAVENOUS
Status: COMPLETED | OUTPATIENT
Start: 2022-03-29 | End: 2022-03-29

## 2022-03-29 RX ADMIN — TETRAKIS(2-METHOXYISOBUTYLISOCYANIDE)COPPER(I) TETRAFLUOROBORATE 20.7 MILLICURIE: 1 INJECTION, POWDER, LYOPHILIZED, FOR SOLUTION INTRAVENOUS at 10:25

## 2022-04-06 ENCOUNTER — OFFICE VISIT (OUTPATIENT)
Dept: CARDIOLOGY CLINIC | Age: 62
End: 2022-04-06
Payer: COMMERCIAL

## 2022-04-06 VITALS
HEIGHT: 62 IN | HEART RATE: 65 BPM | WEIGHT: 213 LBS | DIASTOLIC BLOOD PRESSURE: 70 MMHG | SYSTOLIC BLOOD PRESSURE: 116 MMHG | OXYGEN SATURATION: 97 % | BODY MASS INDEX: 39.2 KG/M2

## 2022-04-06 DIAGNOSIS — I50.32 CHRONIC DIASTOLIC CONGESTIVE HEART FAILURE (HCC): Primary | ICD-10-CM

## 2022-04-06 DIAGNOSIS — R01.1 MURMUR: ICD-10-CM

## 2022-04-06 DIAGNOSIS — I10 ESSENTIAL HYPERTENSION: ICD-10-CM

## 2022-04-06 PROCEDURE — 99214 OFFICE O/P EST MOD 30 MIN: CPT | Performed by: SPECIALIST

## 2022-04-06 NOTE — PROGRESS NOTES
Chief Complaint   Patient presents with    Follow-up     1 month    Hypertension    CHF    Heart Murmur     Visit Vitals  /70 (BP 1 Location: Left upper arm, BP Patient Position: Sitting)   Pulse 65   Ht 5' 2\" (1.575 m)   Wt 213 lb (96.6 kg)   SpO2 97%   BMI 38.96 kg/m²     Chest pain denied   SOB denied   Palpitations denied   Swelling in hands/feet denied   Dizziness denied   Recent hospital stays denied   Refills denied

## 2022-04-06 NOTE — PROGRESS NOTES
Sis Jackson MD. [facility]              Patient: Chandler Fuentes  : 1960      Today's Date: 2022        HISTORY OF PRESENT ILLNESS:     History of Present Illness:   She has been doing fine lately. Has infrequent sharp chest ache. Breathing is OK - no orthopnea. Class 2 [place]. PAST MEDICAL HISTORY:     Past Medical History:   Diagnosis Date    Arthritis     CHF (congestive heart failure) ([address]     Diabetes ([address]     Hypertension     Left renal mass     Renal cell cancer, left (HCC)     Clear cell renal cell carcinoma. Past Surgical History:   Procedure Laterality Date    HX HEART CATHETERIZATION      HX HYSTERECTOMY      HX NEPHRECTOMY  2020    Robotic assisted laparoscopic left partial nephrectomy    HX OTHER SURGICAL             MEDICATIONS:     Current Outpatient Medications   Medication Sig Dispense Refill    losartan (COZAAR) 50 mg tablet Take 1 Tablet by mouth daily. 30 Tablet 2    loratadine (Claritin) 10 mg tablet Take 1 Tablet by mouth daily for 90 days. 90 Tablet 0    TURMERIC, BULK, by Does Not Apply route.  ginger, Zingiber officinalis, (ginger extract) 250 mg cap Take  by mouth.  Apple Cider Vinegar 300 mg tab Take  by mouth.  furosemide (LASIX) 40 mg tablet Take 1 Tablet by mouth daily. 90 Tablet 0    metFORMIN (GLUCOPHAGE) 500 mg tablet Take 2 Tablets by mouth two (2) times daily (with meals). 120 Tablet 2    acetaminophen (Tylenol Extra Strength) 500 mg tablet Take  by mouth every six (6) hours as needed for Pain.  atorvastatin (LIPITOR) 40 mg tablet TK 1 T PO QD HS      mirtazapine (REMERON) 7.5 mg tablet Take 1 Tablet by mouth nightly.  (Patient not taking: Reported on 2022) 30 Tablet 0       Allergies   Allergen Reactions    Cephalexin Rash    Doxycycline Nausea Only    Hydrocodone-Acetaminophen Itching    Meperidine Itching    Oxycodone-Acetaminophen Hives and Rash    Penicillins Hives and Itching    Propoxyphene N-Acetaminophen Hives and Rash    Shellfish Containing Products Other (comments) and Rash     Stomach pains      Sulfa (Sulfonamide Antibiotics) Itching    Triamterene-Hydrochlorothiazid Myalgia             SOCIAL HISTORY:     Social History     Tobacco Use    Smoking status: Never Smoker    Smokeless tobacco: Never Used   Vaping Use    Vaping Use: Never used   Substance Use Topics    Alcohol use: Yes     Comment: social    Drug use: Never         FAMILY HISTORY:     Family History   Problem Relation Age of Onset    Lung Cancer Father     Heart Disease Maternal Grandmother     Heart Disease Mother     Breast Cancer Paternal Aunt            REVIEW OF SYMPTOMS:     Review of Symptoms:  Constitutional: Negative for fever, chills  HEENT: Negative for nosebleeds, tinnitus, and vision changes. Respiratory: Negative for cough, wheezing  Cardiovascular: Negative for orthopnea, claudication, syncope, and PND. Gastrointestinal: Negative for abdominal pain, diarrhea, melena. Genitourinary: Negative for dysuria  Musculoskeletal: Negative for myalgias. Skin: Negative for rash  Heme: No problems bleeding. Neurological: Negative for speech change and focal weakness. PHYSICAL EXAM:     Physical Exam:  Visit Vitals  /70 (BP 1 Location: Left upper arm, BP Patient Position: Sitting)   Pulse 65   Ht 5' 2\" (1.575 m)   Wt 213 lb (96.6 kg)   SpO2 97%   BMI 38.96 kg/m²     Patient appears generally well, mood and affect are appropriate and pleasant. HEENT:  Hearing intact, non-icteric, normocephalic, atraumatic. Neck Exam: Supple, No JVD   Lung Exam: Clear to auscultation, even breath sounds. Cardiac Exam: Regular rate and rhythm with 2/6 systolic murmur sternal border  Abdomen: Soft, non-tender, normal bowel sounds. Obese   Extremities: Moves all ext well. No lower extremity edema.   MSKTL: Overall good ROM ext  Skin: No significant rashes  Psych: Appropriate affect  Neuro - Grossly intact      LABS / OTHER STUDIES reviewed:       Lab Results   Component Value Date/Time    Sodium 139 03/17/2022 12:00 AM    Potassium 4.7 03/17/2022 12:00 AM    Chloride 101 03/17/2022 12:00 AM    CO2 25 03/17/2022 12:00 AM    Anion gap 8 03/04/2022 10:16 AM    Glucose 103 (H) 03/17/2022 12:00 AM    BUN 13 03/17/2022 12:00 AM    Creatinine 0.68 03/17/2022 12:00 AM    BUN/Creatinine ratio 19 03/17/2022 12:00 AM    GFR est AA >60 03/04/2022 10:16 AM    GFR est non-AA >60 03/04/2022 10:16 AM    Calcium 10.3 03/17/2022 12:00 AM    Bilirubin, total 0.8 02/10/2022 09:25 AM    Alk. phosphatase 77 02/10/2022 09:25 AM    Protein, total 7.3 02/10/2022 09:25 AM    Albumin 3.5 02/10/2022 09:25 AM    Globulin 3.8 02/10/2022 09:25 AM    A-G Ratio 0.9 (L) 02/10/2022 09:25 AM    ALT (SGPT) 23 02/10/2022 09:25 AM    AST (SGOT) 25 02/10/2022 09:25 AM     Lab Results   Component Value Date/Time    WBC 5.3 02/10/2022 09:25 AM    HGB 11.7 02/10/2022 09:25 AM    HCT 37.7 02/10/2022 09:25 AM    PLATELET 579 04/78/4846 09:25 AM    MCV 95.0 02/10/2022 09:25 AM       Lab Results   Component Value Date/Time    Cholesterol, total 125 02/10/2022 09:25 AM    HDL Cholesterol 68 02/10/2022 09:25 AM    LDL, calculated 41.8 02/10/2022 09:25 AM    VLDL, calculated 15.2 02/10/2022 09:25 AM    Triglyceride 76 02/10/2022 09:25 AM    CHOL/HDL Ratio 1.8 02/10/2022 09:25 AM         CARDIAC DIAGNOSTICS:     Cardiac Evaluation Includes:  I reviewed the results below. EKG 2/28/22 - NSR, normal    Venous Doppler 3/18/22 -   Bilateral lower extremity venous duplex negative for deep venous thrombosis or thrombophlebitis.  INCIDENTAL FINDINGS: Prominent lymph node noted in the right groin which measures 1.95 x 0.62 cm --->Groin U/S ordered for FU    Chest CTA 3/18/22 - no PE or dissection   Echo 3/21/22 - LVEF 63%, mild MAC        ASSESSMENT AND PLAN:     Assessment and Plan:    1) History of CHF  - Was unable to get records from [facility]   - Echo 3/21/22 - LVEF 63%, mild MAC  - she had LE swelling when lasix was cut back. BB stopped due to  Low BP's. - she is volume compensated (ProBNP 75 on 3/17/22)   - cont ARB, lasix     2) Mild Systolic Murmur   - Echo 6/47/80 - LVEF 63%, mild MAC  - no sig valve disease     3) HTN  - BP OK - cont meds     4) Dyslipidemia   - cont statin   - prior labs OK     5) Obesity - she is seeing a nutritionist     6) See me in 12 months with an echo then    Takes care of  (stroke Nov 21)         Connor Garnica MD, [address], [address]  [address] [place] [address].  Suite [facility] [address], [address] [place] [address]  Ph: [phone]   Ph [phone]

## 2022-04-12 ENCOUNTER — HOSPITAL ENCOUNTER (OUTPATIENT)
Dept: ULTRASOUND IMAGING | Age: 62
Discharge: HOME | End: 2022-04-12
Attending: INTERNAL MEDICINE
Payer: COMMERCIAL

## 2022-04-12 DIAGNOSIS — R59.0 LYMPHADENOPATHY OF LEFT CERVICAL REGION: ICD-10-CM

## 2022-04-12 DIAGNOSIS — R45.89 DEPRESSED MOOD: ICD-10-CM

## 2022-04-12 DIAGNOSIS — G47.00 INSOMNIA, UNSPECIFIED TYPE: ICD-10-CM

## 2022-04-12 DIAGNOSIS — R59.0 LYMPHADENOPATHY, INGUINAL: ICD-10-CM

## 2022-04-12 PROCEDURE — 76882 US LMTD JT/FCL EVL NVASC XTR: CPT

## 2022-04-12 PROCEDURE — 76536 US EXAM OF HEAD AND NECK: CPT

## 2022-04-12 RX ORDER — MIRTAZAPINE 7.5 MG/1
7.5 TABLET, FILM COATED ORAL
Qty: 30 TABLET | Refills: 0 | Status: SHIPPED | OUTPATIENT
Start: 2022-04-12 | End: 2022-04-18 | Stop reason: SDUPTHER

## 2022-04-18 DIAGNOSIS — G47.00 INSOMNIA, UNSPECIFIED TYPE: ICD-10-CM

## 2022-04-18 DIAGNOSIS — R45.89 DEPRESSED MOOD: ICD-10-CM

## 2022-04-18 RX ORDER — MIRTAZAPINE 7.5 MG/1
7.5 TABLET, FILM COATED ORAL
Qty: 30 TABLET | Refills: 0 | Status: SHIPPED | OUTPATIENT
Start: 2022-04-18 | End: 2022-04-29

## 2022-04-21 ENCOUNTER — OFFICE VISIT (OUTPATIENT)
Dept: ONCOLOGY | Age: 62
End: 2022-04-21
Payer: COMMERCIAL

## 2022-04-21 VITALS
BODY MASS INDEX: 39.56 KG/M2 | RESPIRATION RATE: 16 BRPM | HEIGHT: 62 IN | DIASTOLIC BLOOD PRESSURE: 75 MMHG | WEIGHT: 215 LBS | SYSTOLIC BLOOD PRESSURE: 120 MMHG | HEART RATE: 66 BPM | OXYGEN SATURATION: 98 %

## 2022-04-21 DIAGNOSIS — G44.89 OTHER HEADACHE SYNDROME: ICD-10-CM

## 2022-04-21 DIAGNOSIS — C64.2 RENAL CANCER, LEFT (HCC): Primary | ICD-10-CM

## 2022-04-21 DIAGNOSIS — R79.89 ELEVATED D-DIMER: ICD-10-CM

## 2022-04-21 DIAGNOSIS — R59.0 LYMPHADENOPATHY, INGUINAL: ICD-10-CM

## 2022-04-21 PROCEDURE — 99214 OFFICE O/P EST MOD 30 MIN: CPT | Performed by: INTERNAL MEDICINE

## 2022-04-21 NOTE — LETTER
4/21/2022    Patient: Brionna Carreon   YOB: 1960   Date of Visit: 4/21/2022     Zak Snell PA-C  [facility] [address]    Dear Zak Snell PA-C,      Thank you for referring Ms. Brionna Carreon to [address] [place] [address] for evaluation. My notes for this consultation are attached. If you have questions, please do not hesitate to call me. I look forward to following your patient along with you.       Sincerely,    Julieth Gallagher MD

## 2022-04-21 NOTE — PROGRESS NOTES
Chief Complaint   Patient presents with    Follow-up     Armando Yeboah is a pleasant 59year old woman who presents as a follow up.  She reports right knee pain

## 2022-04-21 NOTE — PROGRESS NOTES
[facility] [place] at [address], [address] [facility]  Salo Ship: [phone]  F: [phone] Patient ID  Name: Carlos Perez  YOB: 1960  MRN: [id number]  Referring Provider:   No referring provider defined for this encounter. Primary Care Provider:   Charlotte Gonzalez       HEMATOLOGY/MEDICAL ONCOLOGY  NOTE   Date of Visit: 04/21/22  Reason for Evaluation:     Chief Complaint   Patient presents with    Follow-up     Carlos Perez is a pleasant 59year old woman who presents as a follow up. She reports right knee pain     Hematology/Oncology Summary:  Please review original records for clinical decision making. This summary highlights focused aspects of patient's ongoing care and may have a recurring section in notes with either updates or remain unchanged as a longitudinal care summary. --------------------------------------------------------------------------------------------------------------------------------------------------------------------------------------------------------------------------  DIAGNOSIS:   Left Renal Cell Carcinoma    ORIGINAL STAGING:   Stage I (T1b Nx)    SITES OF DISEASE:   Left Renal Upper Pole    CURRENT TREATMENT:   surveillance    PRIOR TREATMENT:   Left partial nephrectomy-9/2020    GOALS OF CARE:   curative    PATHOLOGY:   No specimen or report number in Dr. Anila Echols note from 4/20/21:  Left kidney, partial nephrectomy:   Left upper pole  4.1 x 3.5 x 2.7cm (pT1b) Unifocal Clear Cell Renal Cell Carcinoma G2. Tumor limited to kidney, Uninvolved by invasive carcinoma. LVI Not identified. No lymph nodes present for evaluation (pNx). PERTINENT CARE EVENTS   4/5/2021: CT Abd Pelvis without IV contrast:  Partial nephrectomy on left with resection of upper pole partially exophytic renal cell cancer noted previously. Surgical changes in the adjacent perinephric region. No recurrent or residual mass. No evidence of metastatic disease. No evidence of disease progression. Liver hypodensities are likely small benign cysts. Prior hysterectomy. No additional acute abnormality. Subjective:     History of Present Illness:     Shadi Benitez is a 59 y.o. F who presents for a follow-up evaluation for inguinal adenopathy. She denies any fevers,chills. She reports having some night sweats but she attributes this to hot flashes. She is still having some difficulty with insomnia. She looked at a medication for sleep assistance but she ultimately declined to take it. She has trouble initiating sleep. Patient overall reports feeling stable. 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 She lives with her daughter and son-in-law. Appetite:Grade 1  Fatigue:Grade 2  Hair Loss:Grade 1  Insomnia:Grade 3  Pain:knee 7    Past Medical History:   Diagnosis Date    Arthritis     CHF (congestive heart failure) (HCC)     Diabetes ([facility] [address]     Hypertension     Left renal mass     Renal cell cancer, left (HCC)     Clear cell renal cell carcinoma. Past Surgical History:   Procedure Laterality Date    HX HEART CATHETERIZATION      HX HYSTERECTOMY      HX NEPHRECTOMY  09/09/2020    Robotic assisted laparoscopic left partial nephrectomy    HX OTHER SURGICAL        Social History     Tobacco Use    Smoking status: Never Smoker    Smokeless tobacco: Never Used   Substance Use Topics    Alcohol use: Yes     Comment: social      Family History   Problem Relation Age of Onset    Lung Cancer Father     Heart Disease Maternal Grandmother     Heart Disease Mother     Breast Cancer Paternal Aunt      Current Outpatient Medications   Medication Sig    losartan (COZAAR) 50 mg tablet Take 1 Tablet by mouth daily.  loratadine (Claritin) 10 mg tablet Take 1 Tablet by mouth daily for 90 days.  TURMERIC, BULK, by Does Not Apply route.     mckinley, Zingiber officinalis, (mckinley extract) 250 mg cap Take  by mouth.    Apple Cider Vinegar 300 mg tab Take  by mouth.  furosemide (LASIX) 40 mg tablet Take 1 Tablet by mouth daily.  metFORMIN (GLUCOPHAGE) 500 mg tablet Take 2 Tablets by mouth two (2) times daily (with meals).  acetaminophen (Tylenol Extra Strength) 500 mg tablet Take  by mouth every six (6) hours as needed for Pain.  atorvastatin (LIPITOR) 40 mg tablet TK 1 T PO QD HS    mirtazapine (REMERON) 7.5 mg tablet Take 1 Tablet by mouth nightly. No current facility-administered medications for this visit. Allergies   Allergen Reactions    Cephalexin Rash    Doxycycline Nausea Only    Hydrocodone-Acetaminophen Itching    Meperidine Itching    Oxycodone-Acetaminophen Hives and Rash    Penicillins Hives and Itching    Propoxyphene N-Acetaminophen Hives and Rash    Shellfish Containing Products Other (comments) and Rash     Stomach pains      Sulfa (Sulfonamide Antibiotics) Itching    Triamterene-Hydrochlorothiazid Myalgia   -  Review of Systems Provided by:  Patient  Review of Systems: A complete review of systems was obtained, reviewed. Pertinent findings reviewed above. Objective:     Visit Vitals  /75   Pulse 66   Resp 16   Ht 5' 2\" (1.575 m)   Wt 215 lb (97.5 kg)   SpO2 98%   BMI 39.32 kg/m²     ECOG PS: 1- Restricted in physically strenuous activity but ambulatory and able to carry out work of a light or sedentary nature, e.g., light house work, office work. Physical Exam  Constitutional: No acute distress. and Non-toxic appearance. HENT: Normocephalic and atraumatic head. and Wearing a mask during COVID-19 precautions. Eyes: Normal Conjunctivae. Anicteric sclerae. Cardiovascular: S1,S2 auscultated. No friction rub. No gallops auscultated. Pulmonary: Normal Respiratory Effort. No wheezing. No rhonchi. No rales. Abdominal: Normal bowel sounds. Soft Abdomen to palpation. No abdominal tenderness. Skin: No rash. Musculoskeletal: No muscle pain on palpation.  No temporal muscle wasting on inspection. Lymph: No cervical, supraclavicular,or axillary lymph node enlargement or tenderness. Positive for right inguinal lymphadenopathy (positive for fullness and tenderness). Neurological: Alert and oriented. No tremor on inspection. Normal Gait. Psychiatric: mood normal. normal speech rate normal affect      Results:   I personally reviewed pertinent labs/results: Outside pathology report and 4/5/21 CT scan summarized in paraphrase in above summary oncology section. I personally reviewed Epic EHR labs/results below:   Lab Results   Component Value Date/Time    WBC 5.3 02/10/2022 09:25 AM    HGB 11.7 02/10/2022 09:25 AM    HCT 37.7 02/10/2022 09:25 AM    PLATELET 239 31/37/1910 09:25 AM    MCV 95.0 02/10/2022 09:25 AM    ABS. NEUTROPHILS 2.7 02/10/2022 09:25 AM     Lab Results   Component Value Date/Time    Sodium 139 03/17/2022 12:00 AM    Potassium 4.7 03/17/2022 12:00 AM    Chloride 101 03/17/2022 12:00 AM    CO2 25 03/17/2022 12:00 AM    Glucose 103 (H) 03/17/2022 12:00 AM    BUN 13 03/17/2022 12:00 AM    Creatinine 0.68 03/17/2022 12:00 AM    GFR est AA >60 03/04/2022 10:16 AM    GFR est non-AA >60 03/04/2022 10:16 AM    Calcium 10.3 03/17/2022 12:00 AM     Lab Results   Component Value Date/Time    Bilirubin, total 0.8 02/10/2022 09:25 AM    ALT (SGPT) 23 02/10/2022 09:25 AM    Alk. phosphatase 77 02/10/2022 09:25 AM    Protein, total 7.3 02/10/2022 09:25 AM    Albumin 3.5 02/10/2022 09:25 AM    Globulin 3.8 02/10/2022 09:25 AM     US HEAD NECK SOFT TISSUE    Result Date: 4/13/2022  INDICATION: Localized enlarged lymph nodes COMPARISON: March 29, 2022 thyroid ultrasound FINDINGS: Limited ultrasound examination of the left neck was performed. A 1.3 x 1.0 x 1.6 cm solid nodule is again seen in the left thyroid lobe. No pathologically enlarged cervical lymph nodes are evident. No neck mass is seen. No sonographic evidence of cervical lymphadenopathy.  No significant change in left thyroid nodule. US EXT NONVAS RT LTD    Result Date: 4/13/2022  INDICATION: Inguinal lymphadenopathy FINES: Limited ultrasound examination of the right groin was performed. 2.9 x 0.8 x 0.5 cm and 2.1 x 0.6 x 1.1 cm right inguinal lymph nodes are seen. Both demonstrate normal lymph node morphology, with no focal cortical thickening and a normal fatty hilum. No lymphadenopathy or mass lesion is evident. Right inguinal lymph nodes are morphologically normal.     Assessment and Recommendations:      1. Renal cancer, left [facility])  She is due for CT imaging of her abdomen and pelvis. We will order this testing. She had a recent CT for PA that was negative for any lesions. Reviewed outside records from [facility]rology; They list in pathology report that she had a 4.1 cm T1b left renal cancer resected by partial nephrectomy. Reviewed that she had a CT abd/pelvis on 4/5/21.    2. Lymphadenopathy, inguinal  Will order CT abdomen/pelvis to better describe the noted lymph nodes however Radiology felt that they were morphologically normal. We discussed that CT imaging may provide us with more information since her lymph node is just under 3cm. While radiology reported morphologically normal, we cannot saw exactly why that lymph node is enlarged. She also has tenderness to palpation of the right groin area. . It could be reactive although cannot fully exclude any cancer. We discussed a fine needle aspirate or excisional lymph node biopsy may be indicated if persists. 3. Elevated d-dimer  We have not seen the results of this d-dimer yet. She will discuss in primary care at her next appointment. Orders Placed This Encounter    CT ABD PELV WO CONT       4. Other headache syndrome  No significant complaints in clinic. If it persists, would recommend pursuing CT head imaging with her renal cancer history although had an early stage tumor resected.       Follow-up and Dispositions    · Return in about 3 weeks (around 5/12/2022).        Astrid Gonzalez MD  Hematology/Medical Oncology Provider  [facility]  P: [phone]      Signed By:   Reji Chang MD

## 2022-04-29 ENCOUNTER — OFFICE VISIT (OUTPATIENT)
Dept: FAMILY MEDICINE CLINIC | Age: 62
End: 2022-04-29
Payer: COMMERCIAL

## 2022-04-29 VITALS
SYSTOLIC BLOOD PRESSURE: 103 MMHG | DIASTOLIC BLOOD PRESSURE: 69 MMHG | OXYGEN SATURATION: 96 % | HEART RATE: 67 BPM | TEMPERATURE: 98.2 F | BODY MASS INDEX: 39.38 KG/M2 | HEIGHT: 62 IN | RESPIRATION RATE: 14 BRPM | WEIGHT: 214 LBS

## 2022-04-29 DIAGNOSIS — G47.00 INSOMNIA, UNSPECIFIED TYPE: Primary | ICD-10-CM

## 2022-04-29 DIAGNOSIS — I10 PRIMARY HYPERTENSION: ICD-10-CM

## 2022-04-29 DIAGNOSIS — R10.11 RIGHT UPPER QUADRANT PAIN: ICD-10-CM

## 2022-04-29 PROBLEM — R01.1 HEART MURMUR, SYSTOLIC: Status: ACTIVE | Noted: 2022-04-29

## 2022-04-29 PROCEDURE — 99213 OFFICE O/P EST LOW 20 MIN: CPT | Performed by: FAMILY MEDICINE

## 2022-04-29 NOTE — PROGRESS NOTES
Luzmaria Souza (: 1960) is a 59 y.o. female, established patient, here for evaluation of the following chief complaint(s):  Diabetes         ASSESSMENT/PLAN:  Below is the assessment and plan developed based on review of pertinent history, physical exam, labs, studies, and medications. 1. Insomnia, unspecified type  Improved to 4 to 5 hours of sleep per night with starting sleep hygiene changes  Patient does not want to start medication at this time  Encouraged her to continue sleep hygiene work-- handout provided  Return to clinic if sx fail to improve     2. Right upper quadrant pain  Sx are mild and there is no tenderness on exam. Sx consistent with biliary colic. Patient has CT abdomen pelvis ordered by Heme/onc Dr. Renetta Becerra due to history of renal cancer.  -She plans to have this done next week. Plan for patient to proceed with CT abdomen and I will also review this when the result returns   -Discussed with patient when to go to ED for acute abdominal pain     3. Primary hypertension  BP at goal in office. Continue lasix and losartan     Return for as previously scheduled. SUBJECTIVE/OBJECTIVE:  Chief Complaint   Patient presents with    Diabetes     Patient is a 58 yo female presenting to office to follow up insomnia. Mirtazapine was prescribed 5 weeks ago for insomnia and depressed mood. Patient as only sleeping 3 hours at this time due to anxiety over -- she is his caregiver currently. She decided not to take this medication at that time due to concern for potential side effects. Instead, she has been working on sleep hygiene and relaxation techniques. Notes her sleep has increased from 3 to 4-5 hours per night.  is sleeping well which allows her to sleep more. Patient does note mild intermittent aching RUQ pain for last month. This occurs 15 minutes after she eats and lasts 15 minutes. It only occurs when she eats fried foods or foods with a lot of dairy.    No fever, chills, nausea, vomiting associated with pain. She has a soft bowel movement every 2 days, unchanged in last month. Denies reflux sx. She does not take NSAIDS. Colonoscopy up to date per patient-- 5 years ago in [facility] with no abnormalities. She has been taking BP at home-- normally 117/73. No low readings, no symptoms of hypotension. Blood glucose has also been at goal, 100 to 120 normally. 1 reading in low 200s after she ate sweet potato pie at a .  She has been to 3 funerals in last 6 weeks. She is planning to have a CT abdomen/pelvis with Dr. Antwon Lazaro within the next 2 weeks. Also has follow up with Dr. Geno Messina for elevated calcium 2022. She had appt with Dr. Leti Hawk 2022. Echo 3/21/22 showed LVEF 63%, mild MAC otherwise no sig valve disease. Lasix and ARB were continued. Her BB had been stopped prior to this due to hypotension and bradycardia. No chest pains, SOB, lower extremity edema. Patient stopped claritin because she felt nauseas after taking this and the following AM. Nausea resolved once stopping Claritin. Notes her allergies have been fine since stopping. Mild eye itching. No SOB, cough, chest pains. Review of systems negative other than mentioned in HPI      Current Outpatient Medications on File Prior to Visit   Medication Sig Dispense Refill    losartan (COZAAR) 50 mg tablet Take 1 Tablet by mouth daily. 30 Tablet 2    TURMERIC, BULK, by Does Not Apply route.  ginger, Zingiber officinalis, (ginger extract) 250 mg cap Take  by mouth.  Apple Cider Vinegar 300 mg tab Take  by mouth.  furosemide (LASIX) 40 mg tablet Take 1 Tablet by mouth daily. 90 Tablet 0    metFORMIN (GLUCOPHAGE) 500 mg tablet Take 2 Tablets by mouth two (2) times daily (with meals). 120 Tablet 2    acetaminophen (Tylenol Extra Strength) 500 mg tablet Take  by mouth every six (6) hours as needed for Pain.       atorvastatin (LIPITOR) 40 mg tablet TK 1 T PO QD HS  [DISCONTINUED] mirtazapine (REMERON) 7.5 mg tablet Take 1 Tablet by mouth nightly. (Patient not taking: Reported on 4/29/2022) 30 Tablet 0    [DISCONTINUED] loratadine (Claritin) 10 mg tablet Take 1 Tablet by mouth daily for 90 days. (Patient not taking: Reported on 4/29/2022) 90 Tablet 0     No current facility-administered medications on file prior to visit.      Patient Active Problem List    Diagnosis Date Noted    Heart murmur, systolic 78/61/3269    Other headache syndrome 04/21/2022    Lymphadenopathy, inguinal 03/24/2022    Elevated d-dimer 03/24/2022    History of partial nephrectomy 09/30/2020    [facility]/14/2020    Renal mass 09/09/2020    Renal cell carcinoma ([facility] 08/14/2020    Controlled type 2 diabetes mellitus without complication, without long-term current use of insulin ([facility] 06/22/2020    Insomnia 05/13/2020    Hyperlipidemia 01/21/2020    Morbid obesity [facility] 09/24/2019    Congestive heart failure (HCC) 04/20/2018    Chronic diastolic congestive heart failure (HCC) 04/17/2018    Abnormal stress test 04/13/2018    Osteoarthritis of knee 04/13/2017    Generalized headache 04/13/2017    Essential hypertension 04/13/2017    Edema 04/13/2017    Allergy to penicillin 04/13/2017    S/P vaginal hysterectomy 07/19/2012    Uterine leiomyoma 07/18/2012    Abdominal pain, right lateral 07/12/2012    Stress incontinence in female 07/12/2012    Second degree uterine prolapse 07/12/2012    Pelvic relaxation disorder 07/12/2012    Menorrhagia 07/12/2012     Allergies   Allergen Reactions    Cephalexin Rash    Doxycycline Nausea Only    Hydrocodone-Acetaminophen Itching    Meperidine Itching    Oxycodone-Acetaminophen Hives and Rash    Penicillins Hives and Itching    Propoxyphene N-Acetaminophen Hives and Rash    Shellfish Containing Products Other (comments) and Rash     Stomach pains      Sulfa (Sulfonamide Antibiotics) Itching    Triamterene-Hydrochlorothiazid Myalgia     Social History     Socioeconomic History    Marital status:    Tobacco Use    Smoking status: Never Smoker    Smokeless tobacco: Never Used   Vaping Use    Vaping Use: Never used   Substance and Sexual Activity    Alcohol use: Yes     Comment: social    Drug use: Never     Social Determinants of Health     Physical Activity: Insufficiently Active    Days of Exercise per Week: 2 days    Minutes of Exercise per Session: 30 min     Past Surgical History:   Procedure Laterality Date    HX HEART CATHETERIZATION      HX HYSTERECTOMY      HX NEPHRECTOMY  09/09/2020    Robotic assisted laparoscopic left partial nephrectomy    HX OTHER SURGICAL         Vitals:    04/29/22 1032   BP: 103/69   Pulse: 67   Resp: 14   Temp: 98.2 °F (36.8 °C)   TempSrc: Oral   SpO2: 96%   Weight: 214 lb (97.1 kg)   Height: 5' 2\" (1.575 m)   PainSc:   0 - No pain        Physical Exam  Constitutional:       Appearance: Normal appearance. HENT:      Head: Normocephalic and atraumatic. Eyes:      Extraocular Movements: Extraocular movements intact. Conjunctiva/sclera: Conjunctivae normal.      Pupils: Pupils are equal, round, and reactive to light. Cardiovascular:      Rate and Rhythm: Normal rate and regular rhythm. Pulses: Normal pulses. Heart sounds: Normal heart sounds. Pulmonary:      Effort: Pulmonary effort is normal.      Breath sounds: Normal breath sounds. Abdominal:      General: Abdomen is flat. Bowel sounds are normal. There is no distension. Palpations: Abdomen is soft. There is no shifting dullness. Tenderness: There is no abdominal tenderness. There is no right CVA tenderness, left CVA tenderness or guarding. Negative signs include Salinas's sign. Hernia: No hernia is present. Neurological:      Mental Status: She is alert.    Psychiatric:         Mood and Affect: Mood normal.         Behavior: Behavior normal.         An electronic signature was used to authenticate this note.   -- Patrick Mayers PA-C

## 2022-04-29 NOTE — PATIENT INSTRUCTIONS
Learning About Acute Cholecystitis  What is cholecystitis? Cholecystitis (say \"koh-lih-sis-TY-tus\") is inflammation of the gallbladder. The gallbladder stores bile. Bile helps the body digest food. Normally, the bile flows from the gallbladder to the small intestine. A gallstone stuck in the cystic duct is most often the cause of sudden (acute) cholecystitis. The cystic duct is the tube that carries the bile out of the gallbladder. The gallstone blocks the bile from leaving the gallbladder. This results in an irritated and swollen gallbladder. The disease can also be caused by infection or trauma, such as an injury from a car accident. Cholecystitis has to be treated right away. You will probably have to go to the hospital. Surgery is the usual treatment. What are the symptoms? Symptoms include:  · Steady and severe pain in the upper right part of belly. This is the most common symptom. The pain can sometimes move to your back or right shoulder blade. It may last for more than 6 hours. · Nausea or vomiting. · A fever. How is it treated? The main way to treat this disease is surgery to remove the gallbladder. This surgery can often be done through small cuts (incisions) in the belly. This is called a laparoscopic cholecystectomy. In some cases, you may need a more extensive surgery. You may need surgery as soon as possible. The doctor may try to reduce swelling and irritation in the gallbladder before removing it. You may be given fluids and antibiotics through an IV. You may also be given pain medicine. Follow-up care is a key part of your treatment and safety. Be sure to make and go to all appointments, and call your doctor if you are having problems. It's also a good idea to know your test results and keep a list of the medicines you take. Where can you learn more?   Go to http://ana rosa-hill.info/  Enter T940 in the search box to learn more about \"Learning About Acute Cholecystitis. \"  Current as of: September 8, 2021               Content Version: 13.2  © 9078-3949 [employer], [employer]. Care instructions adapted under license by [employer] (which disclaims liability or warranty for this information). If you have questions about a medical condition or this instruction, always ask your healthcare professional. [place] [address] any warranty or liability for your use of this information.

## 2022-04-29 NOTE — PROGRESS NOTES
Chief Complaint   Patient presents with    Diabetes     Pt being seen for diabetes  Labs  -pt states she wants to discuss pain in her abdomen  -pt states this pain only happens when she eats fried foods and milk  -pt states this is like an ache    1. Have you been to the ER, urgent care clinic since your last visit? Hospitalized since your last visit? No    2. Have you seen or consulted any other health care providers outside of the [address] [place] [address] since your last visit? Include any pap smears or colon screening.  No     Pt has no other concerns

## 2022-05-06 ENCOUNTER — HOSPITAL ENCOUNTER (OUTPATIENT)
Dept: CT IMAGING | Age: 62
Discharge: HOME | End: 2022-05-06
Attending: INTERNAL MEDICINE
Payer: COMMERCIAL

## 2022-05-06 DIAGNOSIS — R59.0 LYMPHADENOPATHY, INGUINAL: ICD-10-CM

## 2022-05-06 DIAGNOSIS — C64.2 RENAL CANCER, LEFT (HCC): ICD-10-CM

## 2022-05-06 LAB — CREAT BLD-MCNC: 0.7 MG/DL (ref 0.6–1.3)

## 2022-05-06 PROCEDURE — 74011000636 HC RX REV CODE- 636: Performed by: STUDENT IN AN ORGANIZED HEALTH CARE EDUCATION/TRAINING PROGRAM

## 2022-05-06 PROCEDURE — 82565 ASSAY OF CREATININE: CPT

## 2022-05-06 PROCEDURE — 74178 CT ABD&PLV WO CNTR FLWD CNTR: CPT

## 2022-05-06 RX ADMIN — IOPAMIDOL 100 ML: 755 INJECTION, SOLUTION INTRAVENOUS at 16:01

## 2022-05-09 ENCOUNTER — TELEPHONE (OUTPATIENT)
Dept: ENDOCRINOLOGY | Age: 62
End: 2022-05-09

## 2022-05-09 NOTE — TELEPHONE ENCOUNTER
----- Message from Tanya Santana MD sent at 5/8/2022 10:10 PM EDT -----  There is a parathyroid adenoma on left side by parathyrodi scan and no tumor seen on usg     There is a nodule on left thyroid lobe by ultrasound   We can discuss this at the visit also

## 2022-05-09 NOTE — PROGRESS NOTES
There is a parathyroid adenoma on left side by parathyrodi scan and no tumor seen on usg     There is a nodule on left thyroid lobe by ultrasound   We can discuss this at the visit also

## 2022-05-09 NOTE — TELEPHONE ENCOUNTER
Called patient and advised her of Dr Herminia Osei comments. She verbalized understanding. Patient confirmed next lab appointment date and next appointment date/time with Dr Eugenio Zamora. She states that she had to reschedule her last appointment due to a death in her family.

## 2022-05-09 NOTE — PROGRESS NOTES
Your CT abdomen and pelvis with and without contrast did not mention any concerning abdominal lymphadenopathy or pelvic adenopathy.  This is great news!     Thanks,  Dr. Osmel Nguyen

## 2022-05-09 NOTE — PROGRESS NOTES
Your CT abdomen and pelvis with and without contrast did not mention any concerning abdominal lymphadenopathy or pelvic adenopathy. This is great news!     Thanks,  Dr. Nuris Phillips

## 2022-05-10 ENCOUNTER — HOSPITAL ENCOUNTER (OUTPATIENT)
Dept: NUTRITION | Age: 62
Discharge: HOME | End: 2022-05-10
Payer: COMMERCIAL

## 2022-05-10 DIAGNOSIS — E11.8 CONTROLLED DIABETES MELLITUS TYPE 2 WITH COMPLICATIONS, UNSPECIFIED WHETHER LONG TERM INSULIN USE (HCC): ICD-10-CM

## 2022-05-10 PROCEDURE — 97803 MED NUTRITION INDIV SUBSEQ: CPT | Performed by: DIETITIAN, REGISTERED

## 2022-05-10 NOTE — PROGRESS NOTES
NUTRITION  FOLLOW-UP TREATMENT NOTE  Patient Name: Luiza Thompson         Date: 5/10/2022  : 1960    YES Patient  Verified  Diagnosis: E11.9 (ICD-10-CM) - Type 2 diabetes mellitus without complications   In time:   845am             Out time:   915am   Total Treatment Time (min):   30     SUBJECTIVE/ASSESSMENT  Current Wt: 217 Previous Wt: 216 Wt Change: +1     Initial Wt: 216 Total Wt change: +1 Height: 62     Changes in medication or medical history? Any new allergies, surgeries or procedures? NO    If yes, update Summary List        Nutrition Diagnosis        Diagnosis Status: Obesity R/T excessive energy intake & learned food patterns AEB BMI >30, dietary recall showing high calorie intake from fat, eating past full, and \"clean plate club\".   []  Improved [x]  No Change    []  Declined   []  Discontinued     Food and nutrition related knowledge deficit R/T lack of prior education for diabetes and nutrition AEB pt questions during session. [x]  Improved []  No Change    []  Declined   []  Discontinued        Nutrition Monitoring and Evaluation: Pt seen today for follow-up visit. Pt noted having a challenging few weeks with 3 consecutive deaths in the family. Pt recognizes that she has been doing more emotional eating. Pt has been able to implement most of the recommended changes including removing the skin from the chicken, eating off of a smaller plate for meals and incorporating more vegetables. Pt has cut back on the amount of tea she is drinking but has not yet been able to increase her water intake. Pt is getting around 3x 8oz bottles of water daily. Pt has been writing down some of her foods and does find this helpful. Pt has also been walking or using pedal bike multiple times throughout the week. Pt has a family reunion and graduation party coming up in the next couple weeks. We discussed ways to incorporate mindful eating for events.      SMB-123 mg/dL    Previous goals:  Met. - Improve consistency of CHO intake w/goal to plan meals with 30-40 gm CHO/meal and 1-2 optional snack of 15-20 gm. Met. - Improve physical activity w/goal to walk or pedal bike for 30 minutes 3 times per week. Met. - Increase accountability and awareness of food choices by recording food and beverage intake by using a food journal at least 3 days per week. Met.- Eat off of smaller plate and avoid going back for seconds of carbohydrate foods  Met. - Cut back on tea and increase water      Nutrition Prescription and  Intervention Educated pt on the pathophysiology of Type II Diabetes, insulin resistance and the rationale for dietary modifications and increased activity. Educated pt on lean proteins, healthy fats, non-starchy vegetables, and complex carbohydrate food sources. Discussed limiting carbohydrates, label reading, meal timing, and appropriate serving sizes. Encouraged pt to avoid sugary beverages. Reviewed meal builder tool, calorie and macro breakdown as well as exercise parameters. Reviewed energy sources like protein, managing blood sugar levels, and getting in a variety of fruits and vegetables. Reviewed importance of protein with each meal. Answered pt questions about fruit and appropriate serving sizes. Patient Education:  [x]  Review current plan with patient   []  Other:    Handouts/  Information Provided: []  Carbohydrates  []  Protein  []  Fiber  []  Serving Sizes  []  Fluids  []  General guidelines []  Diabetes  []  Cholesterol  []  Sodium  []  SBGM  []  Food Journals  []  Others:      Patient Goals - Continue to eat from smaller plate.   - Include a protein with each meal, premier protein drink with breakfast.  - Write out food choices   - Increase water intake to 4x 8 oz bottles daily.       PLAN  [x]  Continue on current plan []  Follow-up PRN   []  Discharge due to :    [x]  Next appt: 3 weeks      Dietitian: Jamil Lao RDN    Date: 5/10/2022 Time: 8:45 AM

## 2022-05-11 ENCOUNTER — TELEPHONE (OUTPATIENT)
Dept: FAMILY MEDICINE CLINIC | Age: 62
End: 2022-05-11

## 2022-05-11 DIAGNOSIS — R10.11 RIGHT UPPER QUADRANT PAIN: Primary | ICD-10-CM

## 2022-05-11 RX ORDER — FUROSEMIDE 40 MG/1
TABLET ORAL
Qty: 30 TABLET | Refills: 2 | Status: SHIPPED | OUTPATIENT
Start: 2022-05-11 | End: 2022-07-17

## 2022-05-11 RX ORDER — METFORMIN HYDROCHLORIDE 500 MG/1
TABLET ORAL
Qty: 120 TABLET | Refills: 2 | Status: SHIPPED | OUTPATIENT
Start: 2022-05-11 | End: 2022-07-17

## 2022-05-11 NOTE — TELEPHONE ENCOUNTER
I called and spoke with patient regarding RUQ abdominal pain. I reviewed the CT abdomen pelvis from 5/6/2022 which showed no calcified gallstones. No mass or ductal dilation of pancreas. Patient reports mild, intermittent, RUQ pain persists. DDx includes functional gallbladder disorder. Recommend either HIDA scan or referral to GI fur further evaluation of symptoms. Patient prefers GI referral- I gave her number for [facility] as well as [facility] to call for first available. Recommend if unable to obtain appointment in next 2 weeks to call back and we will proceed with ordering HIDA scan. Discussed ED for severe or worsening abdominal pain. Patient voices understanding.

## 2022-05-12 ENCOUNTER — OFFICE VISIT (OUTPATIENT)
Dept: ONCOLOGY | Age: 62
End: 2022-05-12
Payer: COMMERCIAL

## 2022-05-12 VITALS
HEIGHT: 62 IN | OXYGEN SATURATION: 98 % | RESPIRATION RATE: 16 BRPM | TEMPERATURE: 97.5 F | HEART RATE: 59 BPM | DIASTOLIC BLOOD PRESSURE: 77 MMHG | WEIGHT: 217 LBS | BODY MASS INDEX: 39.93 KG/M2 | SYSTOLIC BLOOD PRESSURE: 117 MMHG

## 2022-05-12 DIAGNOSIS — C64.2 RENAL CANCER, LEFT (HCC): Primary | ICD-10-CM

## 2022-05-12 DIAGNOSIS — R59.0 LYMPHADENOPATHY, INGUINAL: ICD-10-CM

## 2022-05-12 PROCEDURE — 99212 OFFICE O/P EST SF 10 MIN: CPT | Performed by: INTERNAL MEDICINE

## 2022-05-12 NOTE — PROGRESS NOTES
[facility] [place] at [address], [address] [place] Fat: [phone]  F: [phone] Patient ID  Name: Jarad Jaime  YOB: 1960  MRN: [id number]  Referring Provider:   No referring provider defined for this encounter. Primary Care Provider:   Kathie Marie       HEMATOLOGY/MEDICAL ONCOLOGY  NOTE   Date of Visit: 05/12/22  Reason for Evaluation:     Chief Complaint   Patient presents with    Follow-up     Patient presents as a follow-up for inguinal adenopathy. She reports right side pain, 6/10. Hematology/Oncology Summary:  Please review original records for clinical decision making. This summary highlights focused aspects of patient's ongoing care and may have a recurring section in notes with either updates or remain unchanged as a longitudinal care summary. --------------------------------------------------------------------------------------------------------------------------------------------------------------------------------------------------------------------------  DIAGNOSIS:   Left Renal Cell Carcinoma    ORIGINAL STAGING:   Stage I (T1b Nx)    SITES OF DISEASE:   Left Renal Upper Pole    CURRENT TREATMENT:   surveillance    PRIOR TREATMENT:   Left partial nephrectomy-9/2020    GOALS OF CARE:   curative    PATHOLOGY:   No specimen or report number in Dr. Rodo Anderson note from 4/20/21:  Left kidney, partial nephrectomy:   Left upper pole  4.1 x 3.5 x 2.7cm (pT1b) Unifocal Clear Cell Renal Cell Carcinoma G2. Tumor limited to kidney, Uninvolved by invasive carcinoma. LVI Not identified. No lymph nodes present for evaluation (pNx). PERTINENT CARE EVENTS   4/5/2021: CT Abd Pelvis without IV contrast:  Partial nephrectomy on left with resection of upper pole partially exophytic renal cell cancer noted previously. Surgical changes in the adjacent perinephric region. No recurrent or residual mass.  No evidence of metastatic disease. No evidence of disease progression. Liver hypodensities are likely small benign cysts. Prior hysterectomy. No additional acute abnormality. Subjective:     History of Present Illness:     Gen Rose is a 59 y.o. F who presents for a follow-up evaluation for discussion about imaging. Patient overall reports feeling stable. He CT abd pelvis did not show any adenopathy. Pain:right side 5  Swelling:Grade 1    She is working with her primary care provider about right abdominal pain. The pain is transient      Past Medical History:   Diagnosis Date    Arthritis     [facility]     Diabetes [facility]     Hypertension     Left renal mass     Renal cell cancer, left (HCC)     Clear cell renal cell carcinoma. Past Surgical History:   Procedure Laterality Date    HX HEART CATHETERIZATION      HX HYSTERECTOMY      HX NEPHRECTOMY  09/09/2020    Robotic assisted laparoscopic left partial nephrectomy    HX OTHER SURGICAL        Social History     Tobacco Use    Smoking status: Never Smoker    Smokeless tobacco: Never Used   Substance Use Topics    Alcohol use: Yes     Comment: social      Family History   Problem Relation Age of Onset    Lung Cancer Father     Heart Disease Maternal Grandmother     Heart Disease Mother     Breast Cancer Paternal Aunt      Current Outpatient Medications   Medication Sig    furosemide (LASIX) 40 mg tablet TAKE 1 TABLET BY MOUTH EVERY DAY    metFORMIN (GLUCOPHAGE) 500 mg tablet TAKE 2 TABLETS BY MOUTH TWICE A DAY WITH MEALS    losartan (COZAAR) 50 mg tablet Take 1 Tablet by mouth daily.  TURMERIC, BULK, by Does Not Apply route.  ginger, Zingiber officinalis, (ginger extract) 250 mg cap Take  by mouth.  Apple Cider Vinegar 300 mg tab Take  by mouth.  acetaminophen (Tylenol Extra Strength) 500 mg tablet Take  by mouth every six (6) hours as needed for Pain.     atorvastatin (LIPITOR) 40 mg tablet TK 1 T PO QD HS     No current facility-administered medications for this visit. Allergies   Allergen Reactions    Cephalexin Rash    Doxycycline Nausea Only    Hydrocodone-Acetaminophen Itching    Meperidine Itching    Oxycodone-Acetaminophen Hives and Rash    Penicillins Hives and Itching    Propoxyphene N-Acetaminophen Hives and Rash    Shellfish Containing Products Other (comments) and Rash     Stomach pains      Sulfa (Sulfonamide Antibiotics) Itching    Triamterene-Hydrochlorothiazid Myalgia   -  Review of Systems Provided by:  Patient  Review of Systems: A complete review of systems was obtained, reviewed. Pertinent findings reviewed above. Objective:     Visit Vitals  /77   Pulse (!) 59   Temp 97.5 °F (36.4 °C)   Resp 16   Ht 5' 2\" (1.575 m)   Wt 217 lb (98.4 kg)   SpO2 98%   BMI 39.69 kg/m²     ECOG PS: 1- Restricted in physically strenuous activity but ambulatory and able to carry out work of a light or sedentary nature, e.g., light house work, office work. Physical Exam  Constitutional: No acute distress. and Non-toxic appearance. HENT: Normocephalic and atraumatic head. and Wearing a mask during COVID-19 precautions. Eyes: Normal Conjunctivae. Anicteric sclerae. Cardiovascular: S1,S2 auscultated. No pitting edema. Pulmonary: Normal Respiratory Effort. No wheezing. No rhonchi. No rales. Abdominal: Normal bowel sounds. Soft Abdomen to palpation. No abdominal tenderness. No rebound tenderness. Skin: No jaundice. No rash. No petechiae. Musculoskeletal: No muscle pain on palpation. No temporal muscle wasting on inspection. Neurological: Alert and oriented. No tremor on inspection. Normal Gait.   Psychiatric: mood normal. normal speech rate normal affect    Results:   I personally reviewed Epic EHR labs/results below:   Lab Results   Component Value Date/Time    WBC 5.3 02/10/2022 09:25 AM    HGB 11.7 02/10/2022 09:25 AM    HCT 37.7 02/10/2022 09:25 AM    PLATELET 399 02/10/2022 09:25 AM    MCV 95.0 02/10/2022 09:25 AM    ABS. NEUTROPHILS 2.7 02/10/2022 09:25 AM     Lab Results   Component Value Date/Time    Sodium 139 03/17/2022 12:00 AM    Potassium 4.7 03/17/2022 12:00 AM    Chloride 101 03/17/2022 12:00 AM    CO2 25 03/17/2022 12:00 AM    Glucose 103 (H) 03/17/2022 12:00 AM    BUN 13 03/17/2022 12:00 AM    Creatinine 0.68 03/17/2022 12:00 AM    GFR est AA >60 03/04/2022 10:16 AM    GFR est non-AA >60 03/04/2022 10:16 AM    Calcium 10.3 03/17/2022 12:00 AM    Creatinine (POC) 0.70 05/06/2022 03:53 PM     Lab Results   Component Value Date/Time    Bilirubin, total 0.8 02/10/2022 09:25 AM    ALT (SGPT) 23 02/10/2022 09:25 AM    Alk. phosphatase 77 02/10/2022 09:25 AM    Protein, total 7.3 02/10/2022 09:25 AM    Albumin 3.5 02/10/2022 09:25 AM    Globulin 3.8 02/10/2022 09:25 AM       CT ABDOMEN W WO CONT AND PELVIS W CONT 5/9/2022  FINDINGS: LIVER: A few small low-density lesions are nonspecific but may represent cysts. No biliary ductal dilatation GALLBLADDER: No calcified gallstone SPLEEN: Unremarkable PANCREAS: No mass or ductal dilatation. ADRENALS: Unremarkable. KIDNEYS/URETERS: No hydronephrosis or perinephric inflammation. Postsurgical changes are seen in the left upper pole kidney. No evidence for any abnormal enhancing renal mass. A few small subcentimeter low-density lesions are too small to characterize. No abnormality of the collecting systems or ureters. PERITONEUM: No abdominal lymphadenopathy or ascites. COLON: No dilatation or wall thickening. APPENDIX: Not clearly visualized SMALL BOWEL: No dilatation or wall thickening. STOMACH: Unremarkable. PELVIS: No pelvic lymphadenopathy or free fluid. BONES: No destructive bone lesion. VISUALIZED THORAX: No significant abnormality ADDITIONAL COMMENTS: N/A   Postsurgical changes seen in the upper pole left kidney without evidence for any residual or metastatic disease. Assessment and Recommendations:      1.  Renal cancer, left ([facility]  -reportedly T1b lesion. We reviewed that scans are annual. She is going to follow closely with Primary Care and I let Mrs. Dominguez Aguirre know to have Sunil Stapleton to contact us if any concerns about new symptoms. -we discussed for her to return in February 2023 since there was no adenopathy mentioned on her recent scan. 2. Lymphadenopathy, inguinal  Pelvic CT did not mention any lymphadenopathy. Follow-up and Dispositions    · Return in about 39 weeks (around 2/9/2023).          Mavis Krishnamurthy MD  Hematology/Medical Oncology Provider  [place] [address]  P: [phone]      Signed By:   Dewayne Josue MD

## 2022-05-12 NOTE — LETTER
5/12/2022    Patient: Jennifer Moncada   YOB: 1960   Date of Visit: 5/12/2022     Jerrica Ceballos PA-C  [address]    Dear Jerrica Ceballos PA-C,      Thank you for referring Ms. Jennifer Moncada to [address] [place] [address] for evaluation. My notes for this consultation are attached. If you have questions, please do not hesitate to call me. I look forward to following your patient along with you.       Sincerely,    Mitch Centneo MD

## 2022-05-12 NOTE — PROGRESS NOTES
1. Have you been to the ER, urgent care clinic since your last visit? Hospitalized since your last visit? No    2. Have you seen or consulted any other health care providers outside of the [address] [place] [address] since your last visit? Include any pap smears or colon screening. No        Vitals:    05/12/22 1016   BP: 117/77   Pulse: (!) 59   Resp: 16   Temp: 97.5 °F (36.4 °C)   SpO2: 98%   Weight: 217 lb (98.4 kg)   Height: 5' 2\" (1.575 m)           Chief Complaint   Patient presents with    Follow-up     Patient presents as a follow-up for inguinal adenopathy. She reports right side pain, 6/10.

## 2022-06-09 ENCOUNTER — OFFICE VISIT (OUTPATIENT)
Dept: FAMILY MEDICINE CLINIC | Age: 62
End: 2022-06-09
Payer: COMMERCIAL

## 2022-06-09 VITALS
HEIGHT: 62 IN | TEMPERATURE: 98.4 F | HEART RATE: 62 BPM | SYSTOLIC BLOOD PRESSURE: 113 MMHG | WEIGHT: 211 LBS | RESPIRATION RATE: 16 BRPM | DIASTOLIC BLOOD PRESSURE: 72 MMHG | OXYGEN SATURATION: 99 % | BODY MASS INDEX: 38.83 KG/M2

## 2022-06-09 DIAGNOSIS — M17.0 PRIMARY OSTEOARTHRITIS OF BOTH KNEES: ICD-10-CM

## 2022-06-09 DIAGNOSIS — E11.9 CONTROLLED TYPE 2 DIABETES MELLITUS WITHOUT COMPLICATION, WITHOUT LONG-TERM CURRENT USE OF INSULIN (HCC): Primary | ICD-10-CM

## 2022-06-09 DIAGNOSIS — G89.29 CHRONIC PAIN OF BOTH KNEES: ICD-10-CM

## 2022-06-09 DIAGNOSIS — M25.562 CHRONIC PAIN OF BOTH KNEES: ICD-10-CM

## 2022-06-09 DIAGNOSIS — M25.552 LEFT HIP PAIN: ICD-10-CM

## 2022-06-09 DIAGNOSIS — R10.11 RUQ PAIN: ICD-10-CM

## 2022-06-09 DIAGNOSIS — M25.561 CHRONIC PAIN OF BOTH KNEES: ICD-10-CM

## 2022-06-09 LAB — HBA1C MFR BLD HPLC: 6.3 %

## 2022-06-09 PROCEDURE — 83036 HEMOGLOBIN GLYCOSYLATED A1C: CPT | Performed by: FAMILY MEDICINE

## 2022-06-09 PROCEDURE — 99214 OFFICE O/P EST MOD 30 MIN: CPT | Performed by: FAMILY MEDICINE

## 2022-06-09 PROCEDURE — 3044F HG A1C LEVEL LT 7.0%: CPT | Performed by: FAMILY MEDICINE

## 2022-06-09 NOTE — PROGRESS NOTES
Chief Complaint   Patient presents with    Diabetes    Hypertension    Labs     NON Fasting : Jello    Leg Pain     Left leg and knees bilat: Arthritis     1. Have you been to the ER, urgent care clinic since your last visit? Hospitalized since your last visit? No    2. Have you seen or consulted any other health care providers outside of the [address] [place] [address] since your last visit? Include any pap smears or colon screening.  No

## 2022-06-09 NOTE — PROGRESS NOTES
Dave Membreno (: 1960) is a 59 y.o. female, established patient, here for evaluation of the following chief complaint(s):  Diabetes, Hypertension, Labs (NON Fasting : Jello), and Leg Pain (Left leg and knees bilat: Arthritis)         ASSESSMENT/PLAN:  Below is the assessment and plan developed based on review of pertinent history, physical exam, labs, studies, and medications. 1. Controlled type 2 diabetes mellitus without complication, without long-term current use of insulin ([facility])  A1C in office improved to 6.3%, continue metformin   Continue dietary changes, low carb, reduce sweets  -     AMB POC HEMOGLOBIN A1C    2. Chronic pain of both knees  3. Primary osteoarthritis of both knees  Needs to establish with ortho in [place] rather than [facility], referral sent to Dr. Jill Tinsley for both hip and knee pain  -     REFERRAL TO ORTHOPEDICS    4. Left hip pain  Chronic, no trauma. DDx includes radiation from left knee osteoarthritis vs other. Refer to Dr. Jill Tinsley for both hip and knee pain   -     REFERRAL TO ORTHOPEDICS    5. RUQ pain  Pain has resolved, discussed with patient acute cholecystitis can be severe and if pain returns to seek care at ED if severe. I still recommend establishing with GI for further evaluation and consideration of HIDA and she voices understanding     Return in about 3 months (around 2022) for fasting labs. SUBJECTIVE/OBJECTIVE:  Chief Complaint   Patient presents with    Diabetes    Hypertension    Labs     NON Fasting : Jello    Leg Pain     Left leg and knees bilat: Arthritis     Patient is a 78-year-old male with type 2 diabetes, CHF, history of renal cancer, elevated calcium, chronic pain and osteoarthritis of bilateral knees presenting to the office for diabetes follow-up as well as knee pain. For diabetes, patient has been meeting with the dietitian and is making significant dietary changes. Decreasing the sweets in her diet. She is continued her metformin. She denies any vision changes, chest pains, shortness of breath, paresthesias in her feet or legs, TIA type symptoms. For chronic knee pain, patient states she was seeing a orthopedic knee specialist in [facility] prior to moving to [place] and was receiving corticosteroid injections in her bilateral knees for osteoarthritis. They were considering hyaluronic acid injections as well. She has not had a corticosteroid injection in 6 to 9 months. Reports her bilateral knee pain has been worsening. When she walks her knees will feel like they are cracking and popping, and occasionally her left knee will give way. She notes mild effusion of the knee intermittently every few weeks when the knee pain becomes severe. She denies any redness or severe swelling of these joints. She denies any calf pain or lower extremity swelling in office today. Takes Tylenol as needed, avoids NSAIDs due to history of renal carcinoma. When left knee pain is severe she will feel this radiates down her anterior shin to her left ankle. We will also radiate up to her left hip. She does have some left lateral hip pain. She denies any weakness in her left lower extremity or sensory changes in her left lower extremity. Denies any low back pain. Denies any weakness of her hips. She is seeing endocrinology for elevated calcium however they are not treating her diabetes. For her right upper quadrant pain, patient states this pain went away 3 weeks ago and has not returned at all. She has not made appointment with GI but she will if the pain returns. She has no blood in her stool, diarrhea, nausea, vomiting, abdominal pain in office today    She did have CT abd/pelv with Dr. Shamika Orta which showed no lymphadenopathy and instructions to follow up in 9 months    . Review of Systems   Constitutional: Negative for fatigue, fever and unexpected weight change. HENT: Negative for hearing loss.     Eyes: Negative for pain and visual disturbance. Respiratory: Negative for cough, chest tightness and shortness of breath. Cardiovascular: Negative for chest pain, palpitations and leg swelling. Gastrointestinal: Negative for abdominal distention, abdominal pain, blood in stool, constipation and diarrhea. Endocrine: Negative for polyphagia. Genitourinary: Negative for dysuria and menstrual problem. Neurological: Negative for dizziness, light-headedness and headaches. Psychiatric/Behavioral: Negative for agitation and behavioral problems. Current Outpatient Medications on File Prior to Visit   Medication Sig Dispense Refill    furosemide (LASIX) 40 mg tablet TAKE 1 TABLET BY MOUTH EVERY DAY 30 Tablet 2    metFORMIN (GLUCOPHAGE) 500 mg tablet TAKE 2 TABLETS BY MOUTH TWICE A DAY WITH MEALS 120 Tablet 2    losartan (COZAAR) 50 mg tablet Take 1 Tablet by mouth daily. 30 Tablet 2    TURMERIC, BULK, by Does Not Apply route.  ginger, Zingiber officinalis, (ginger extract) 250 mg cap Take  by mouth.  Apple Cider Vinegar 300 mg tab Take  by mouth.  acetaminophen (Tylenol Extra Strength) 500 mg tablet Take  by mouth every six (6) hours as needed for Pain.  atorvastatin (LIPITOR) 40 mg tablet TK 1 T PO QD HS       No current facility-administered medications on file prior to visit.      Patient Active Problem List    Diagnosis Date Noted    Heart murmur, systolic 78/99/6411    Other headache syndrome 04/21/2022    Lymphadenopathy, inguinal 03/24/2022    Elevated d-dimer 03/24/2022    History of partial nephrectomy 09/30/2020    Renal cancer, left ([facility] [address] 09/14/2020    Renal mass 09/09/2020    Renal cell carcinoma ([facility] [address] 08/14/2020    Controlled type 2 diabetes mellitus without complication, without long-term current use of insulin ([facility] [address] 06/22/2020    Insomnia 05/13/2020    Hyperlipidemia 01/21/2020    Morbid obesity ([address] 09/24/2019    Congestive heart failure ([address] 04/20/2018    Chronic diastolic congestive heart failure ([facility] [address] 04/17/2018    Abnormal stress test 04/13/2018    Osteoarthritis of knee 04/13/2017    Generalized headache 04/13/2017    Essential hypertension 04/13/2017    Edema 04/13/2017    Allergy to penicillin 04/13/2017    S/P vaginal hysterectomy 07/19/2012    Uterine leiomyoma 07/18/2012    Abdominal pain, right lateral 07/12/2012    Stress incontinence in female 07/12/2012    Second degree uterine prolapse 07/12/2012    Pelvic relaxation disorder 07/12/2012    Menorrhagia 07/12/2012     Allergies   Allergen Reactions    Cephalexin Rash    Doxycycline Nausea Only    Hydrocodone-Acetaminophen Itching    Meperidine Itching    Oxycodone-Acetaminophen Hives and Rash    Penicillins Hives and Itching    Propoxyphene N-Acetaminophen Hives and Rash    Shellfish Containing Products Other (comments) and Rash     Stomach pains      Sulfa (Sulfonamide Antibiotics) Itching    Triamterene-Hydrochlorothiazid Myalgia     Past Surgical History:   Procedure Laterality Date    HX HEART CATHETERIZATION      HX HYSTERECTOMY      HX NEPHRECTOMY  09/09/2020    Robotic assisted laparoscopic left partial nephrectomy    HX OTHER SURGICAL       Social History     Tobacco Use    Smoking status: Never Smoker    Smokeless tobacco: Never Used   Vaping Use    Vaping Use: Never used   Substance Use Topics    Alcohol use: Yes     Comment: social    Drug use: Never     Family History   Problem Relation Age of Onset    Lung Cancer Father     Heart Disease Maternal Grandmother     Heart Disease Mother     Breast Cancer Paternal Aunt        Vitals:    06/09/22 1143   BP: 113/72   Pulse: 62   Resp: 16   Temp: 98.4 °F (36.9 °C)   TempSrc: Oral   SpO2: 99%   Weight: 211 lb (95.7 kg)   Height: 5' 2\" (1.575 m)   PainSc:   8   PainLoc: Leg        Physical Exam  Constitutional:       General: She is not in acute distress. Appearance: Normal appearance.  She is not ill-appearing or toxic-appearing. HENT:      Head: Normocephalic and atraumatic. Eyes:      Extraocular Movements: Extraocular movements intact. Conjunctiva/sclera: Conjunctivae normal.      Pupils: Pupils are equal, round, and reactive to light. Cardiovascular:      Rate and Rhythm: Normal rate and regular rhythm. Pulses: Normal pulses. Heart sounds: Normal heart sounds. Pulmonary:      Effort: Pulmonary effort is normal.      Breath sounds: Normal breath sounds. Abdominal:      General: Abdomen is flat. Bowel sounds are normal.      Palpations: Abdomen is soft. Musculoskeletal:      Cervical back: Normal range of motion and neck supple. No rigidity. Lumbar back: No spasms, tenderness or bony tenderness. Right hip: No tenderness or bony tenderness. Normal range of motion. Normal strength. Left hip: Tenderness (lateral hip) present. No bony tenderness. Normal range of motion. Normal strength. Right upper leg: No swelling, deformity or tenderness. Left upper leg: No swelling, deformity or tenderness. Right knee: No swelling, deformity, effusion, erythema or bony tenderness. Normal range of motion. Tenderness present. No medial joint line or lateral joint line tenderness. Normal patellar mobility. Left knee: No swelling, deformity, effusion, erythema or bony tenderness. Normal range of motion. Tenderness present. No medial joint line or lateral joint line tenderness. Normal patellar mobility. Right lower leg: No tenderness. No edema. Left lower leg: No tenderness. No edema. Lymphadenopathy:      Cervical: No cervical adenopathy. Neurological:      Mental Status: She is alert. Psychiatric:         Mood and Affect: Mood normal.         Behavior: Behavior normal.         An electronic signature was used to authenticate this note.   -- Patrick Mayers PA-C

## 2022-06-14 LAB
25(OH)D3+25(OH)D2 SERPL-MCNC: 41.4 NG/ML (ref 30–100)
ALBUMIN SERPL-MCNC: 3.7 G/DL (ref 3.8–4.8)
ALBUMIN/GLOB SERPL: 1.2 {RATIO} (ref 1.2–2.2)
ALP SERPL-CCNC: 70 IU/L (ref 44–121)
ALT SERPL-CCNC: 10 IU/L (ref 0–32)
AST SERPL-CCNC: 20 IU/L (ref 0–40)
BILIRUB SERPL-MCNC: 0.8 MG/DL (ref 0–1.2)
BUN SERPL-MCNC: 11 MG/DL (ref 8–27)
BUN/CREAT SERPL: 19 (ref 12–28)
CALCIUM SERPL-MCNC: 10.2 MG/DL (ref 8.7–10.3)
CHLORIDE SERPL-SCNC: 104 MMOL/L (ref 96–106)
CO2 SERPL-SCNC: 21 MMOL/L (ref 20–29)
CREAT SERPL-MCNC: 0.58 MG/DL (ref 0.57–1)
EGFR: 102 ML/MIN/1.73
GLOBULIN SER CALC-MCNC: 3 G/DL (ref 1.5–4.5)
GLUCOSE SERPL-MCNC: 118 MG/DL (ref 65–99)
POTASSIUM SERPL-SCNC: 3.9 MMOL/L (ref 3.5–5.2)
PROT SERPL-MCNC: 6.7 G/DL (ref 6–8.5)
PTH RELATED PROT SERPL-SCNC: <2 PMOL/L
PTH-INTACT SERPL-MCNC: 44 PG/ML (ref 15–65)
SODIUM SERPL-SCNC: 141 MMOL/L (ref 134–144)
TSH SERPL DL<=0.005 MIU/L-ACNC: 1.92 UIU/ML (ref 0.45–4.5)

## 2022-06-21 ENCOUNTER — HOSPITAL ENCOUNTER (OUTPATIENT)
Dept: NUTRITION | Age: 62
Discharge: HOME | End: 2022-06-21
Payer: COMMERCIAL

## 2022-06-21 DIAGNOSIS — I10 ESSENTIAL HYPERTENSION: ICD-10-CM

## 2022-06-21 DIAGNOSIS — E66.01 MORBID OBESITY (HCC): ICD-10-CM

## 2022-06-21 DIAGNOSIS — E11.9 CONTROLLED TYPE 2 DIABETES MELLITUS WITHOUT COMPLICATION, WITHOUT LONG-TERM CURRENT USE OF INSULIN (HCC): ICD-10-CM

## 2022-06-21 PROCEDURE — 97803 MED NUTRITION INDIV SUBSEQ: CPT | Performed by: DIETITIAN, REGISTERED

## 2022-06-21 NOTE — PROGRESS NOTES
NUTRITION - FOLLOW-UP TREATMENT NOTE  Patient Name: Forest Im         Date: 2022  : 1960    YES Patient  Verified  Diagnosis: E11.9 (ICD-10-CM) - Type 2 diabetes mellitus without complications   In time:   1030am             Out time:   1100am   Total Treatment Time (min):   30     SUBJECTIVE/ASSESSMENT  Current Wt: 208 Previous Wt: 217 Wt Change: -9     Initial Wt: 216 Total Wt change: -8 Height: 62     Changes in medication or medical history? Any new allergies, surgeries or procedures? NO    If yes, update Summary List        Nutrition Diagnosis        Diagnosis Status: Obesity R/T excessive energy intake & learned food patterns AEB BMI >30, dietary recall showing high calorie intake from fat, eating past full, and \"clean plate club\".   [x]  Improved []  No Change    []  Declined   []  Discontinued     Food and nutrition related knowledge deficit R/T lack of prior education for diabetes and nutrition AEB pt questions during session. [x]  Improved []  No Change    []  Declined   []  Discontinued        Nutrition Monitoring and Evaluation: Pt seen today for follow-up visit. Pt has done very well over the past few weeks to improve eating habits. Pt has been less likely to feel the need to clean her plate. Pt has also been filling any sweets cravings with fruit. Pt is typically eating 2 meals daily with late breakfast and dinner. Pt having cravings in the middle of the day and we noted the possibility of blood sugar dropping and needing a snack to keep this stable. Pt has been walking but has pain in knees. Pt found out that she will be needing a knee replacement and will schedule for August. Pt got lab results and A1c is now down to 6.3 from 7.4 previously. SMB-123 mg/dL    Previous goals:  - Continue to eat from smaller plate.   - Include a protein with each meal, premier protein drink with breakfast.  - Write out food choices   - Increase water intake to 4x 8 oz bottles daily. Nutrition Prescription and  Intervention Educated pt on the pathophysiology of Type II Diabetes, insulin resistance and the rationale for dietary modifications and increased activity. Educated pt on lean proteins, healthy fats, non-starchy vegetables, and complex carbohydrate food sources. Discussed limiting carbohydrates, label reading, meal timing, and appropriate serving sizes. Encouraged pt to avoid sugary beverages. Reviewed meal builder tool, calorie and macro breakdown as well as exercise parameters. Reviewed energy sources like protein, managing blood sugar levels, and getting in a variety of fruits and vegetables. Reviewed importance of protein with each meal. Answered pt questions about fruit and appropriate serving sizes. Patient Education:  [x]  Review current plan with patient   []  Other:    Handouts/  Information Provided: []  Carbohydrates  []  Protein  []  Fiber  []  Serving Sizes  []  Fluids  []  General guidelines []  Diabetes  []  Cholesterol  []  Sodium  []  SBGM  []  Food Journals  []  Others:      Patient Goals - Plan for a snack around 2-3pm daily   - Increase walking daily   - Decrease portion sizes, avoid seconds and leave something on the plate.       PLAN  [x]  Continue on current plan []  Follow-up PRN   []  Discharge due to :    [x]  Next appt: 3 weeks      Dietitian: Burak Reid RDN    Date: 6/21/2022 Time: 1030 AM

## 2022-06-22 ENCOUNTER — OFFICE VISIT (OUTPATIENT)
Dept: ENDOCRINOLOGY | Age: 62
End: 2022-06-22
Payer: COMMERCIAL

## 2022-06-22 VITALS
HEART RATE: 66 BPM | BODY MASS INDEX: 38.61 KG/M2 | OXYGEN SATURATION: 99 % | SYSTOLIC BLOOD PRESSURE: 122 MMHG | HEIGHT: 62 IN | DIASTOLIC BLOOD PRESSURE: 60 MMHG | TEMPERATURE: 97.8 F | WEIGHT: 209.8 LBS

## 2022-06-22 DIAGNOSIS — E83.52 HYPERCALCEMIA: ICD-10-CM

## 2022-06-22 DIAGNOSIS — E04.2 MULTINODULAR GOITER: ICD-10-CM

## 2022-06-22 DIAGNOSIS — M81.0 SENILE OSTEOPOROSIS: Primary | ICD-10-CM

## 2022-06-22 PROCEDURE — 99214 OFFICE O/P EST MOD 30 MIN: CPT | Performed by: INTERNAL MEDICINE

## 2022-06-22 NOTE — PATIENT INSTRUCTIONS
SPECIFIC INSTRUCTIONS BELOW     No meds       -------------PAY ATTENTION TO THESE GENERAL INSTRUCTIONS -----------------      - The medications prescribed at this visit will not be available at pharmacy until 6 pm       - YOUR MED LIST IS NOT UP TO DATE AS SOME CHANGES ARE BEING MADE AFTER THE VISIT - FOLLOW SPECIFIC INSTRUCTIONS  ABOVE     -ANY tests other than blood work, which you opt to do  outside the  [facility] facilities, you are responsible for prior authorizations if  required    - [address] [facility]- PLEASE IGNORE     Results     *Normal results will not be notified by a phone call starting January 1 2021   *If you have an upcoming visit, the results will be discussed at the visit   *Please sign up for MY CHART if you want access to your lab and test results  *Abnormal results which require immediate attention will be notified by phone call   *Abnormal results which do not require immediate assistance will be notified in 1-2 weeks       Refills    -    have your pharmacy send us a refill request . Refills are done max for one year and a visit is a must before refills are extended    Follow up appointments -  highly encourage you to make it when you are checking out. We can accommodate you into the schedule based on your clinical situation, but not for extending refills beyond a year. Labs are important to give refills and is important to get labs before the visit     Phone calls  -  Allow  24 hrs.  for non-urgent calls to be returned  Prior authorization - It may take 2-4 weeks to process  Forms  -  FMLA, DMV etc., will take up to 2 weeks to process  Cancellations - please notify the office 2 days in advance   Samples  - will only be dispensed at visits       If not showing for the appointments and cancelling appointments within 24 hours are kept track of and three  of such situations in  two consecutive years will likely be considered for termination from the practice    -------------------------------------------------------------------------------------------------------------------

## 2022-06-22 NOTE — LETTER
6/22/2022    Patient: Brionna Carreon   YOB: 1960   Date of Visit: 6/22/2022     Zak Snell PA-C  [address] [id number] [facility]    Dear Zak Snell PA-C,      Thank you for referring Ms. Brionna Carreon to [address] [place] [address] for evaluation. My notes for this consultation are attached. If you have questions, please do not hesitate to call me. I look forward to following your patient along with you.       Sincerely,    Shilpa Mejia MD Attending Only

## 2022-06-22 NOTE — PROGRESS NOTES
HISTORY OF PRESENT ILLNESS  Mo Brooks is a 59 y.o. female. First  Follow up  After  Initial visit for hypercalcemia  From March 2022     She is here to discuss labs and imaging studies       March 2022   The patient has  asymptomatic elevation of the serum and calcium and parathormone. She has not  been taking medication : thiazide diuretic   She has not had previous history of head or neck radiation. She does not have familial history of endocrine malignancies. Patient reports being dizzy occasionally and her BP today is on lower side     Review of Systems   None       Physical Exam   Constitutional: She is oriented to person, place, and time. She appears well-developed and well-nourished. Psychiatric: She has a normal mood and affect. Lab Results   Component Value Date/Time    ALT (SGPT) 10 06/07/2022 10:38 AM    Alk. phosphatase 70 06/07/2022 10:38 AM    Bilirubin, total 0.8 06/07/2022 10:38 AM    Albumin 3.7 (L) 06/07/2022 10:38 AM    Protein, total 6.7 06/07/2022 10:38 AM    INR 0.94 08/20/2020 11:00 AM    Prothrombin time 10.5 08/20/2020 11:00 AM    PLATELET 459 04/33/2881 09:25 AM     Lab Results   Component Value Date/Time    GFR est non-AA >60 03/04/2022 10:16 AM    GFRNA, POC >60 05/06/2022 03:53 PM    GFR est AA >60 03/04/2022 10:16 AM    GFRAA, POC >60 05/06/2022 03:53 PM    Creatinine 0.58 06/07/2022 10:38 AM    Creatinine (POC) 0.70 05/06/2022 03:53 PM    BUN 11 06/07/2022 10:38 AM    Sodium 141 06/07/2022 10:38 AM    Potassium 3.9 06/07/2022 10:38 AM    Chloride 104 06/07/2022 10:38 AM    CO2 21 06/07/2022 10:38 AM    PTH, Intact 44 06/07/2022 10:38 AM     Lab Results   Component Value Date/Time    TSH 1.920 06/07/2022 10:38 AM        ASSESSMENT and PLAN    1.  Hypercalcemia  : Patient has moderate range of hypercalcemia, chronic and asymptomatic    Likely  -  primary hyperparathyroidism    Never crossed 11 gm /dl   Upon review of labs   No h/o renal stones      parathyroid scan, negative    PTH rel peptide  -  Negative    24 hr urine for calcium, sodium , creatinine  - low calcium     bone DEXA  Is pending     She does not require aggressive intervention , opting for  for watchful observation    2. H/ o renal cancer July 2020  -  Left kidney - s/p  Nephrectomy     3. Thyroid nodules / MNG : reviewed the usg - showed bilateral thyrodi nodules   Left thyroid nodule is to be biopsied - will schedule it in office   Discussed pros and cons of FNA     Euthyroid  And  Asymptomatic        4.   Has h/o CHF - following Dr. Kell Noble       Reviewed results with patient and discussed the labs being ordered today/bnv  Patient voiced understanding of plan of care

## 2022-06-26 DIAGNOSIS — I10 ESSENTIAL HYPERTENSION: ICD-10-CM

## 2022-06-27 RX ORDER — LOSARTAN POTASSIUM 50 MG/1
TABLET ORAL
Qty: 30 TABLET | Refills: 2 | Status: SHIPPED | OUTPATIENT
Start: 2022-06-27 | End: 2022-08-03

## 2022-07-12 ENCOUNTER — HOSPITAL ENCOUNTER (OUTPATIENT)
Dept: MAMMOGRAPHY | Age: 62
Discharge: HOME | End: 2022-07-12
Attending: INTERNAL MEDICINE
Payer: COMMERCIAL

## 2022-07-12 DIAGNOSIS — M81.0 SENILE OSTEOPOROSIS: ICD-10-CM

## 2022-07-12 PROCEDURE — 77080 DXA BONE DENSITY AXIAL: CPT

## 2022-07-17 RX ORDER — FUROSEMIDE 40 MG/1
TABLET ORAL
Qty: 30 TABLET | Refills: 2 | Status: SHIPPED | OUTPATIENT
Start: 2022-07-17 | End: 2022-08-03

## 2022-07-17 RX ORDER — METFORMIN HYDROCHLORIDE 500 MG/1
TABLET ORAL
Qty: 120 TABLET | Refills: 2 | Status: SHIPPED | OUTPATIENT
Start: 2022-07-17

## 2022-07-19 ENCOUNTER — APPOINTMENT (OUTPATIENT)
Dept: NUTRITION | Age: 62
End: 2022-07-19

## 2022-08-03 ENCOUNTER — HOSPITAL ENCOUNTER (OUTPATIENT)
Dept: PREADMISSION TESTING | Age: 62
Discharge: HOME | End: 2022-08-03
Payer: COMMERCIAL

## 2022-08-03 VITALS
TEMPERATURE: 97.5 F | SYSTOLIC BLOOD PRESSURE: 129 MMHG | BODY MASS INDEX: 37.19 KG/M2 | HEART RATE: 66 BPM | RESPIRATION RATE: 14 BRPM | WEIGHT: 209.88 LBS | OXYGEN SATURATION: 98 % | HEIGHT: 63 IN | DIASTOLIC BLOOD PRESSURE: 69 MMHG

## 2022-08-03 LAB
ALBUMIN SERPL-MCNC: 3.7 G/DL (ref 3.5–5)
ALBUMIN/GLOB SERPL: 1 {RATIO} (ref 1.1–2.2)
ALP SERPL-CCNC: 85 U/L (ref 45–117)
ALT SERPL-CCNC: 16 U/L (ref 12–78)
ANION GAP SERPL CALC-SCNC: 3 MMOL/L (ref 5–15)
APPEARANCE UR: CLEAR
AST SERPL-CCNC: 23 U/L (ref 15–37)
ATRIAL RATE: 51 BPM
BACTERIA URNS QL MICRO: NEGATIVE /HPF
BASOPHILS # BLD: 0 K/UL (ref 0–0.1)
BASOPHILS NFR BLD: 1 % (ref 0–1)
BILIRUB SERPL-MCNC: 1 MG/DL (ref 0.2–1)
BILIRUB UR QL: NEGATIVE
BUN SERPL-MCNC: 12 MG/DL (ref 6–20)
BUN/CREAT SERPL: 17 (ref 12–20)
CALCIUM SERPL-MCNC: 10.9 MG/DL (ref 8.5–10.1)
CALCULATED P AXIS, ECG09: 46 DEGREES
CALCULATED R AXIS, ECG10: -5 DEGREES
CALCULATED T AXIS, ECG11: 4 DEGREES
CHLORIDE SERPL-SCNC: 104 MMOL/L (ref 97–108)
CO2 SERPL-SCNC: 32 MMOL/L (ref 21–32)
COLOR UR: NORMAL
CREAT SERPL-MCNC: 0.7 MG/DL (ref 0.55–1.02)
DIAGNOSIS, 93000: NORMAL
DIFFERENTIAL METHOD BLD: NORMAL
EOSINOPHIL # BLD: 0.1 K/UL (ref 0–0.4)
EOSINOPHIL NFR BLD: 2 % (ref 0–7)
EPITH CASTS URNS QL MICRO: NORMAL /LPF
ERYTHROCYTE [DISTWIDTH] IN BLOOD BY AUTOMATED COUNT: 12.6 % (ref 11.5–14.5)
EST. AVERAGE GLUCOSE BLD GHB EST-MCNC: 146 MG/DL
GLOBULIN SER CALC-MCNC: 3.8 G/DL (ref 2–4)
GLUCOSE SERPL-MCNC: 112 MG/DL (ref 65–100)
GLUCOSE UR STRIP.AUTO-MCNC: NEGATIVE MG/DL
HBA1C MFR BLD: 6.7 % (ref 4–5.6)
HCT VFR BLD AUTO: 37.1 % (ref 35–47)
HGB BLD-MCNC: 11.8 G/DL (ref 11.5–16)
HGB UR QL STRIP: NEGATIVE
HYALINE CASTS URNS QL MICRO: NORMAL /LPF (ref 0–2)
IMM GRANULOCYTES # BLD AUTO: 0 K/UL (ref 0–0.04)
IMM GRANULOCYTES NFR BLD AUTO: 0 % (ref 0–0.5)
KETONES UR QL STRIP.AUTO: NEGATIVE MG/DL
LEUKOCYTE ESTERASE UR QL STRIP.AUTO: NEGATIVE
LYMPHOCYTES # BLD: 1.9 K/UL (ref 0.8–3.5)
LYMPHOCYTES NFR BLD: 36 % (ref 12–49)
MCH RBC QN AUTO: 29.4 PG (ref 26–34)
MCHC RBC AUTO-ENTMCNC: 31.8 G/DL (ref 30–36.5)
MCV RBC AUTO: 92.5 FL (ref 80–99)
MONOCYTES # BLD: 0.4 K/UL (ref 0–1)
MONOCYTES NFR BLD: 8 % (ref 5–13)
NEUTS SEG # BLD: 2.8 K/UL (ref 1.8–8)
NEUTS SEG NFR BLD: 53 % (ref 32–75)
NITRITE UR QL STRIP.AUTO: NEGATIVE
NRBC # BLD: 0 K/UL (ref 0–0.01)
NRBC BLD-RTO: 0 PER 100 WBC
P-R INTERVAL, ECG05: 144 MS
PH UR STRIP: 5.5 [PH] (ref 5–8)
PLATELET # BLD AUTO: 303 K/UL (ref 150–400)
PMV BLD AUTO: 9.5 FL (ref 8.9–12.9)
POTASSIUM SERPL-SCNC: 4.1 MMOL/L (ref 3.5–5.1)
PROT SERPL-MCNC: 7.5 G/DL (ref 6.4–8.2)
PROT UR STRIP-MCNC: NEGATIVE MG/DL
Q-T INTERVAL, ECG07: 420 MS
QRS DURATION, ECG06: 102 MS
QTC CALCULATION (BEZET), ECG08: 387 MS
RBC # BLD AUTO: 4.01 M/UL (ref 3.8–5.2)
RBC #/AREA URNS HPF: NORMAL /HPF (ref 0–5)
SODIUM SERPL-SCNC: 139 MMOL/L (ref 136–145)
SP GR UR REFRACTOMETRY: 1.01 (ref 1–1.03)
UA: UC IF INDICATED,UAUC: NORMAL
UROBILINOGEN UR QL STRIP.AUTO: 0.2 EU/DL (ref 0.2–1)
VENTRICULAR RATE, ECG03: 51 BPM
WBC # BLD AUTO: 5.3 K/UL (ref 3.6–11)
WBC URNS QL MICRO: NORMAL /HPF (ref 0–4)

## 2022-08-03 PROCEDURE — 93005 ELECTROCARDIOGRAM TRACING: CPT

## 2022-08-03 PROCEDURE — 85025 COMPLETE CBC W/AUTO DIFF WBC: CPT

## 2022-08-03 PROCEDURE — 81001 URINALYSIS AUTO W/SCOPE: CPT

## 2022-08-03 PROCEDURE — 36415 COLL VENOUS BLD VENIPUNCTURE: CPT

## 2022-08-03 PROCEDURE — 80053 COMPREHEN METABOLIC PANEL: CPT

## 2022-08-03 PROCEDURE — 83036 HEMOGLOBIN GLYCOSYLATED A1C: CPT

## 2022-08-03 RX ORDER — FUROSEMIDE 40 MG/1
40 TABLET ORAL EVERY MORNING
COMMUNITY
End: 2022-10-30

## 2022-08-03 RX ORDER — LOSARTAN POTASSIUM 50 MG/1
50 TABLET ORAL EVERY MORNING
COMMUNITY
End: 2022-09-19

## 2022-08-03 NOTE — PERIOP NOTES
[address] [address] [facility]   MAIN OR                                  [phone]   MAIN PRE OP                          [phone]                                                                                AMBULATORY PRE OP          [phone]  PRE-ADMISSION TESTING    [phone]     Surgery Date:  Wed 8/10       Is surgery arrival time given by surgeon? NO  If NO, [facility] staff will call you between 3 and 7pm the day before your surgery with your arrival time. (If your surgery is on a Monday, we will call you the Friday before.)    Call [phone] after 7pm Monday-Friday if you did not receive this call. INSTRUCTIONS BEFORE YOUR SURGERY   When You  Arrive Arrive at the [address] [facility] on the day of your surgery  Have your insurance card, photo ID, and any copayment (if needed)   Food   and   Drink NO food or drink after midnight the night before surgery    This means NO water, gum, mints, coffee, juice, etc.  No alcohol (beer, wine, liquor) 24 hours before and after surgery   Medications to   TAKE   Morning of Surgery MEDICATIONS TO TAKE THE MORNING OF SURGERY WITH A SIP OF WATER:   Tylenol if needed, lasix   Medications  To  STOP      7 days before surgery Non-Steroidal anti-inflammatory Drugs (NSAID's): for example, Ibuprofen (Advil, Motrin), Naproxen (Aleve)  Aspirin, if taking for pain   Herbal supplements, vitamins, and fish oil  Other:  (Pain medications not listed above, including Tylenol may be taken)   Blood  Thinners If you take  Aspirin, Plavix, Coumadin, or any blood-thinning or anti-blood clot medicine, talk to the doctor who prescribed the medications for pre-operative instructions.    Bathing Clothing  Jewelry  Valuables     If you shower the morning of surgery, please do not apply anything to your skin (lotions, powders, deodorant, or makeup, especially mascara)  Follow Chlor[employer] body wash instructions provided to you during PAT appointment. Begin 3 days prior to surgery. Do not shave or trim anywhere 24 hours before surgery  Wear your hair loose or down; no pony-tails, buns, or metal hair clips  Wear loose, comfortable, clean clothes  Wear glasses instead of contacts  Leave money, valuables, and jewelry, including body piercings, at home  If you were given an [employer], bring it on day of surgery. Going Home - or Spending the Night SAME-DAY SURGERY: You must have a responsible adult drive you home and stay with you 24 hours after surgery  ADMITS: If your doctor is keeping you in the hospital after surgery, leave personal belongings/luggage in your car until you have a hospital room number. Hospital discharge time is 12 noon  Drivers must be here before 12 noon unless you are told differently   Special Instructions Hold losartan and metformin day of sugery       Follow all instructions so your surgery wont be cancelled. Please, be on time. If a situation occurs and you are delayed the day of surgery, call [phone] or [phone]. If your physical condition changes (like a fever, cold, flu, etc.) call your surgeon. Home medication(s) reviewed and verified verbally with list during PAT appointment. The patient was contacted in person. The patient verbalizes understanding of all instructions and does not need reinforcement.

## 2022-08-04 LAB
BACTERIA SPEC CULT: NORMAL
BACTERIA SPEC CULT: NORMAL
SERVICE CMNT-IMP: NORMAL

## 2022-08-05 ENCOUNTER — OFFICE VISIT (OUTPATIENT)
Dept: FAMILY MEDICINE CLINIC | Age: 62
End: 2022-08-05
Payer: COMMERCIAL

## 2022-08-05 VITALS
OXYGEN SATURATION: 97 % | SYSTOLIC BLOOD PRESSURE: 108 MMHG | WEIGHT: 207 LBS | HEART RATE: 61 BPM | HEIGHT: 63 IN | BODY MASS INDEX: 36.68 KG/M2 | DIASTOLIC BLOOD PRESSURE: 68 MMHG

## 2022-08-05 DIAGNOSIS — R01.1 HEART MURMUR, SYSTOLIC: ICD-10-CM

## 2022-08-05 DIAGNOSIS — M17.12 PRIMARY OSTEOARTHRITIS OF LEFT KNEE: Primary | ICD-10-CM

## 2022-08-05 DIAGNOSIS — E11.9 CONTROLLED TYPE 2 DIABETES MELLITUS WITHOUT COMPLICATION, WITHOUT LONG-TERM CURRENT USE OF INSULIN (HCC): ICD-10-CM

## 2022-08-05 DIAGNOSIS — Z71.89 ADVANCED CARE PLANNING/COUNSELING DISCUSSION: ICD-10-CM

## 2022-08-05 DIAGNOSIS — I50.32 CHRONIC DIASTOLIC CONGESTIVE HEART FAILURE (HCC): ICD-10-CM

## 2022-08-05 DIAGNOSIS — I10 ESSENTIAL HYPERTENSION: ICD-10-CM

## 2022-08-05 DIAGNOSIS — Z01.818 PRE-OP EVALUATION: ICD-10-CM

## 2022-08-05 PROBLEM — G47.00 INSOMNIA: Status: RESOLVED | Noted: 2020-05-13 | Resolved: 2022-08-05

## 2022-08-05 PROBLEM — G44.89 OTHER HEADACHE SYNDROME: Status: RESOLVED | Noted: 2022-04-21 | Resolved: 2022-08-05

## 2022-08-05 PROBLEM — R51.9 GENERALIZED HEADACHE: Status: RESOLVED | Noted: 2017-04-13 | Resolved: 2022-08-05

## 2022-08-05 PROBLEM — I50.9 CONGESTIVE HEART FAILURE (HCC): Status: RESOLVED | Noted: 2018-04-20 | Resolved: 2022-08-05

## 2022-08-05 PROBLEM — N28.89 RENAL MASS: Status: RESOLVED | Noted: 2020-09-09 | Resolved: 2022-08-05

## 2022-08-05 PROBLEM — R94.39 ABNORMAL STRESS TEST: Status: RESOLVED | Noted: 2018-04-13 | Resolved: 2022-08-05

## 2022-08-05 PROBLEM — R60.9 EDEMA: Status: RESOLVED | Noted: 2017-04-13 | Resolved: 2022-08-05

## 2022-08-05 PROBLEM — R79.89 ELEVATED D-DIMER: Status: RESOLVED | Noted: 2022-03-24 | Resolved: 2022-08-05

## 2022-08-05 PROBLEM — C64.9 RENAL CELL CARCINOMA (HCC): Status: RESOLVED | Noted: 2020-08-14 | Resolved: 2022-08-05

## 2022-08-05 PROCEDURE — 3044F HG A1C LEVEL LT 7.0%: CPT | Performed by: NURSE PRACTITIONER

## 2022-08-05 PROCEDURE — 99214 OFFICE O/P EST MOD 30 MIN: CPT | Performed by: NURSE PRACTITIONER

## 2022-08-05 RX ORDER — MUPIROCIN 20 MG/G
OINTMENT TOPICAL 2 TIMES DAILY
Qty: 22 G | Refills: 0 | Status: SHIPPED | OUTPATIENT
Start: 2022-08-05 | End: 2022-08-12

## 2022-08-05 RX ORDER — CHLORHEXIDINE GLUCONATE 4 G/100ML
59 SOLUTION TOPICAL DAILY
Qty: 118 ML | Refills: 0 | Status: SHIPPED | OUTPATIENT
Start: 2022-08-05 | End: 2022-08-07

## 2022-08-05 NOTE — H&P (VIEW-ONLY)
Preoperative Evaluation    Date of Exam: 8/5/2022    Reed Severance is a 57 y.o. female ([id number]) who presents for preoperative evaluation. L TKR planned 8/20/22 at [facility] with gen [place] and regional anestheia. Latex Allergy: no    Problem List:     Patient Active Problem List    Diagnosis Date Noted    Heart murmur, systolic 09/71/2229    Lymphadenopathy, inguinal 03/24/2022    History of partial nephrectomy 09/30/2020    Renal cancer, left ([address] 09/14/2020    Controlled type 2 diabetes mellitus without complication, without long-term current use of insulin ([address] 06/22/2020    Hyperlipidemia 01/21/2020    Morbid obesity ([address] 09/24/2019    Chronic diastolic congestive heart failure ([address] 04/17/2018    Osteoarthritis of knee 04/13/2017    Essential hypertension 04/13/2017    Allergy to penicillin 04/13/2017    S/P vaginal hysterectomy 07/19/2012    Stress incontinence in female 07/12/2012    Pelvic relaxation disorder 07/12/2012     Medical History:     Past Medical History:   Diagnosis Date    Arthritis     CHF (congestive heart failure) ([address]     Diabetes ([address]     Hypertension     Left renal mass     Renal cell cancer, left ([address] 2020    has had clear scans since partial nephrectomy in 2020    Second degree uterine prolapse 7/12/2012    Uterine leiomyoma 7/18/2012     Allergies: Allergies   Allergen Reactions    Other Food Itching     Requires hypoallergenic sheets when hospitalized (from 2020 in [address]    Shellfish Containing Products Anaphylaxis      Mouth and throat swelling with smoked oysters.  But can tolerate shrimp and crabs      Hydromorphone Other (comments)    Cephalexin Rash    Doxycycline Nausea Only    Hydrocodone-Acetaminophen Itching    Meperidine Itching    Oxycodone-Acetaminophen Hives and Rash    Penicillins Hives and Itching    Propoxyphene Hives and Rash    Propoxyphene N-Acetaminophen Hives and Rash    Sulfa (Sulfonamide Antibiotics) Itching Triamterene-Hydrochlorothiazid Myalgia      Medications:     Current Outpatient Medications   Medication Sig    furosemide (LASIX) 40 mg tablet Take 40 mg by mouth Every morning. losartan (COZAAR) 50 mg tablet Take 50 mg by mouth Every morning. metFORMIN (GLUCOPHAGE) 500 mg tablet TAKE 2 TABLETS BY MOUTH TWICE A DAY WITH MEALS    TURMERIC, BULK, Take 1 Dose by mouth Every morning. ginger, Zingiber officinalis, (ginger extract) 250 mg cap Take 1 Capsule by mouth Every morning. Apple Cider Vinegar 300 mg tab Take 1 Tablet by mouth Every morning. acetaminophen (TYLENOL) 500 mg tablet Take  by mouth every six (6) hours as needed for Pain. atorvastatin (LIPITOR) 40 mg tablet TK 1 T PO QD HS     No current facility-administered medications for this visit. Surgical History:     Past Surgical History:   Procedure Laterality Date    HX HEART CATHETERIZATION  2020    HX HYSTERECTOMY  2006    approx date    HX NEPHRECTOMY  09/09/2020    Robotic assisted laparoscopic left partial nephrectomy    HX OTHER SURGICAL       Social History:     Social History     Socioeconomic History    Marital status:    Tobacco Use    Smoking status: Never    Smokeless tobacco: Never   Vaping Use    Vaping Use: Never used   Substance and Sexual Activity    Alcohol use: Yes     Comment: once monthly    Drug use: Never     Social Determinants of Health     Physical Activity: Insufficiently Active    Days of Exercise per Week: 2 days    Minutes of Exercise per Session: 30 min       Anesthesia Complications: None  History of abnormal bleeding : None  History of Blood Transfusions: no  Health Care Directive or Living Will: no    Objective:     ROS:   Feeling well. No dyspnea or chest pain on exertion. No abdominal pain, change in bowel habits, black or bloody stools. No urinary tract symptoms. GYN ROS: no breast pain or new or enlarging lumps on self exam, no vaginal bleeding. No neurological complaints.     OBJECTIVE: The patient appears well, alert, oriented x 3, in no distress. Visit Vitals  /68 (BP 1 Location: Left upper arm, BP Patient Position: Sitting, BP Cuff Size: Large adult)   Pulse 61   Ht 5' 2.5\" (1.588 m)   Wt 207 lb (93.9 kg)   SpO2 97%   BMI 37.26 kg/m²     HEENT:ENT normal.  Neck supple. No adenopathy or thyromegaly. RODNEY. Chest: Lungs are clear, good air entry, no wheezes, rhonchi or rales. Cardiovascular: S1 and S2 normal, no murmurs, regular rate and rhythm. Abdomen: soft without tenderness, guarding, mass or organomegaly. Extremities: show no edema, normal peripheral pulses. Neurological: is normal, no focal findings. DIAGNOSTICS:   1. EKG: EKG FINDINGS - unchanged from previous tracings, sinus bradycardia  2. Labs:   Lab Results   Component Value Date/Time    WBC 5.3 08/03/2022 09:49 AM    HGB 11.8 08/03/2022 09:49 AM    HCT 37.1 08/03/2022 09:49 AM    PLATELET 312 12/89/3273 09:49 AM    MCV 92.5 08/03/2022 09:49 AM     Lab Results   Component Value Date/Time    Hemoglobin A1c 6.7 (H) 08/03/2022 09:49 AM    Hemoglobin A1c 7.4 (H) 02/10/2022 09:25 AM    Hemoglobin A1c, External 7.0 06/17/2021 12:00 AM    Glucose 112 (H) 08/03/2022 09:49 AM    Microalbumin/Creat ratio (mg/g creat) 10 02/10/2022 09:25 AM    Microalbumin,urine random 0.78 02/10/2022 09:25 AM    LDL, calculated 41.8 02/10/2022 09:25 AM    Creatinine (POC) 0.70 05/06/2022 03:53 PM    Creatinine 0.70 08/03/2022 09:49 AM      Lab Results   Component Value Date/Time    Cholesterol, total 125 02/10/2022 09:25 AM    HDL Cholesterol 68 02/10/2022 09:25 AM    LDL, calculated 41.8 02/10/2022 09:25 AM    Triglyceride 76 02/10/2022 09:25 AM    CHOL/HDL Ratio 1.8 02/10/2022 09:25 AM     Lab Results   Component Value Date/Time    ALT (SGPT) 16 08/03/2022 09:49 AM    Alk.  phosphatase 85 08/03/2022 09:49 AM    Bilirubin, total 1.0 08/03/2022 09:49 AM    Albumin 3.7 08/03/2022 09:49 AM    Protein, total 7.5 08/03/2022 09:49 AM    INR 0.94 08/20/2020 11:00 AM    Prothrombin time 10.5 08/20/2020 11:00 AM    PLATELET 511 87/91/7433 09:49 AM       Lab Results   Component Value Date/Time    GFR est non-AA >60 08/03/2022 09:49 AM    GFRNA, POC >60 05/06/2022 03:53 PM    GFR est AA >60 08/03/2022 09:49 AM    GFRAA, POC >60 05/06/2022 03:53 PM    Creatinine 0.70 08/03/2022 09:49 AM    Creatinine (POC) 0.70 05/06/2022 03:53 PM    BUN 12 08/03/2022 09:49 AM    Sodium 139 08/03/2022 09:49 AM    Potassium 4.1 08/03/2022 09:49 AM    Chloride 104 08/03/2022 09:49 AM    CO2 32 08/03/2022 09:49 AM    PTH, Intact 44 06/07/2022 10:38 AM     Lab Results   Component Value Date/Time    TSH 1.920 06/07/2022 10:38 AM      Diagnoses and all orders for this visit:    1. Primary osteoarthritis of left knee  L TKR planned 8/10/22 [facility]    2. Pre-op evaluation  See impression    3. Advanced care planning/counseling discussion  No advanced directive- form given, patient will complete prior to surgery and bring to hospital with her    4. Controlled type 2 diabetes mellitus without complication, without long-term current use of insulin (HCC)  A1c at goal  Continue metformin- follow surgeon or anesthesia's instructions for holding prior to surgery    5. Chronic diastolic congestive heart failure (HCC)  No acute symptoms  Continue current meds (losartan, furosemide)  F/up with cardiology (Dr. Moran [facility]) as scheduled    6. Essential hypertension  At goal  Continue losartan at current dose    7. Heart murmur, systolic  Echo March [id number]    Left Ventricle: Left ventricle size is normal. Normal wall thickness. Normal wall motion. Normal left ventricular systolic function. EF by 2D Simpsons Biplane is 63%. Normal diastolic function for age. Mitral Valve: Mildly thickened leaflet. Mild annular calcification of the mitral valve.       IMPRESSION:   Congestive heart failure  Diabetes mellitus  At acceptable risk for planned surgery  No contraindications to planned surgery    Follow-up and Dispositions    Return in about 3 months (around 11/5/2022), or if symptoms worsen or fail to improve, for diabetes, blood pressure. I have discussed the diagnosis with the patient and the intended plan as seen in the above orders. The patient has received an after-visit summary and questions were answered concerning future plans. Patient conveyed understanding of the plan at the time of the visit.     Kemar Pendleton NP   8/5/2022

## 2022-08-05 NOTE — PERIOP NOTES
Chronic migraine without aura, intractable. Superimposed tension component. Slight increase in frequency compared to December 2019. Overall, improved with botox as daily frequency eliminated. Polypharmacy on GBP, TPM, cymbalta, effexor with limited improvement.        Patient's nasal swab positive for MSSA. Lab result and medication treatment reviewed with patient. Patient instructed to use Bactroban ointment twice daily for 5 days and to use CHG wash for a total of 5 days starting tonight. Patient verbalized understanding.

## 2022-08-05 NOTE — PROGRESS NOTES
Verified name and birth date for privacy precautions. Chart reviewed in preparation for today's visit. Chief Complaint   Patient presents with    Pre-op Exam     Surgery [phone] @ 1430 by Dr. Forde Merlin Maintenance Due   Topic    Hepatitis C Screening     Eye Exam Retinal or Dilated     DTaP/Tdap/Td series (1 - Tdap)    Colorectal Cancer Screening Combo     Shingrix Vaccine Age 50> (1 of 2)    Pneumococcal 0-64 years (2 - PCV)    COVID-19 Vaccine (4 - Booster for Pfizer series)         Wt Readings from Last 3 Encounters:   08/05/22 207 lb (93.9 kg)   08/03/22 209 lb 14.1 oz (95.2 kg)   06/22/22 209 lb 12.8 oz (95.2 kg)     Temp Readings from Last 3 Encounters:   08/03/22 97.5 °F (36.4 °C)   06/22/22 97.8 °F (36.6 °C) (Temporal)   06/09/22 98.4 °F (36.9 °C) (Oral)     BP Readings from Last 3 Encounters:   08/05/22 108/68   08/03/22 129/69   06/22/22 122/60     Pulse Readings from Last 3 Encounters:   08/05/22 61   08/03/22 66   06/22/22 66         Learning Assessment:  :     Learning Assessment 3/21/2022   PRIMARY LEARNER Patient   PRIMARY LANGUAGE ENGLISH   LEARNER PREFERENCE PRIMARY LISTENING   ANSWERED BY self   RELATIONSHIP SELF       Depression Screening:  :     3 most recent PHQ Screens 8/5/2022   Little interest or pleasure in doing things Not at all   Feeling down, depressed, irritable, or hopeless Not at all   Total Score PHQ 2 0       Fall Risk Assessment:  :     No flowsheet data found. Abuse Screening:  :     Abuse Screening Questionnaire 4/29/2022 3/4/2022   Do you ever feel afraid of your partner? N N   Are you in a relationship with someone who physically or mentally threatens you? N N   Is it safe for you to go home?  Y Y       Coordination of Care Questionnaire:  :     1) Have you been to an emergency room, urgent care clinic since your last visit? no   Hospitalized since your last visit? no             2) Have you seen or consulted any other health care providers outside of [facility] since your last visit?  yes  (Include any pap smears or colon screenings in this section.)    3) Do you have an Advance Directive on file? no      Patient is currently accompanied by her self.      ------------------------------------------------

## 2022-08-05 NOTE — PROGRESS NOTES
Notification of Positive MSSA and Treatment Ordered by Preadmission Testing Nurse Practitioner      Patient Name:  Ji Gorman  MRN: [id number]  : 1960    Surgeon: Favian Saba  Date of surgery: 8/10/22  Procedure: Erby Lush    Allergies: Allergies   Allergen Reactions    Other Food Itching     Requires hypoallergenic sheets when hospitalized (from  in  [address]    Shellfish Containing Products Anaphylaxis      Mouth and throat swelling with smoked oysters. But can tolerate shrimp and crabs      Hydromorphone Other (comments)    Cephalexin Rash    Doxycycline Nausea Only    Hydrocodone-Acetaminophen Itching    Meperidine Itching    Oxycodone-Acetaminophen Hives and Rash    Penicillins Hives and Itching    Propoxyphene Hives and Rash    Propoxyphene N-Acetaminophen Hives and Rash    Sulfa (Sulfonamide Antibiotics) Itching    Triamterene-Hydrochlorothiazid Myalgia       MRSA results:      All Micro Results       None            Treatment ordered: Bactroban ointment 2%- apply intranasal twice daily for 5 days and CHG wash for two extra days    Date patient notified of results / treatment prescribed: 22    The patient was instructed to add medication and date they began treatment to their \"Prior to Admission Medication\" list given to them in [address], NP

## 2022-08-05 NOTE — PROGRESS NOTES
Preoperative Evaluation    Date of Exam: 8/5/2022    Annie Cranker is a 57 y.o. female ([id number]) who presents for preoperative evaluation. L TKR planned 8/20/22 at [facility] with Dr. Marcin Tan, gen and regional anestheia. Latex Allergy: no    Problem List:     Patient Active Problem List    Diagnosis Date Noted    Heart murmur, systolic 93/15/7665    Lymphadenopathy, inguinal 03/24/2022    History of partial nephrectomy 09/30/2020    Renal cancer, left ([address] 09/14/2020    Controlled type 2 diabetes mellitus without complication, without long-term current use of insulin ([address] 06/22/2020    Hyperlipidemia 01/21/2020    Morbid obesity ([address] 09/24/2019    Chronic diastolic congestive heart failure ([address] 04/17/2018    Osteoarthritis of knee 04/13/2017    Essential hypertension 04/13/2017    Allergy to penicillin 04/13/2017    S/P vaginal hysterectomy 07/19/2012    Stress incontinence in female 07/12/2012    Pelvic relaxation disorder 07/12/2012     Medical History:     Past Medical History:   Diagnosis Date    Arthritis     CHF (congestive heart failure) ([address]     Diabetes ([address]     Hypertension     Left renal mass     Renal cell cancer, left ([address] 2020    has had clear scans since partial nephrectomy in 2020    Second degree uterine prolapse 7/12/2012    Uterine leiomyoma 7/18/2012     Allergies: Allergies   Allergen Reactions    Other Food Itching     Requires hypoallergenic sheets when hospitalized (from 2020 in [address]    Shellfish Containing Products Anaphylaxis      Mouth and throat swelling with smoked oysters.  But can tolerate shrimp and crabs      Hydromorphone Other (comments)    Cephalexin Rash    Doxycycline Nausea Only    Hydrocodone-Acetaminophen Itching    Meperidine Itching    Oxycodone-Acetaminophen Hives and Rash    Penicillins Hives and Itching    Propoxyphene Hives and Rash    Propoxyphene N-Acetaminophen Hives and Rash    Sulfa (Sulfonamide Antibiotics) Itching Triamterene-Hydrochlorothiazid Myalgia      Medications:     Current Outpatient Medications   Medication Sig    furosemide (LASIX) 40 mg tablet Take 40 mg by mouth Every morning. losartan (COZAAR) 50 mg tablet Take 50 mg by mouth Every morning. metFORMIN (GLUCOPHAGE) 500 mg tablet TAKE 2 TABLETS BY MOUTH TWICE A DAY WITH MEALS    TURMERIC, BULK, Take 1 Dose by mouth Every morning. ginger, Zingiber officinalis, (ginger extract) 250 mg cap Take 1 Capsule by mouth Every morning. Apple Cider Vinegar 300 mg tab Take 1 Tablet by mouth Every morning. acetaminophen (TYLENOL) 500 mg tablet Take  by mouth every six (6) hours as needed for Pain. atorvastatin (LIPITOR) 40 mg tablet TK 1 T PO QD HS     No current facility-administered medications for this visit. Surgical History:     Past Surgical History:   Procedure Laterality Date    HX HEART CATHETERIZATION  2020    HX HYSTERECTOMY  2006    approx date    HX NEPHRECTOMY  09/09/2020    Robotic assisted laparoscopic left partial nephrectomy    HX OTHER SURGICAL       Social History:     Social History     Socioeconomic History    Marital status:    Tobacco Use    Smoking status: Never    Smokeless tobacco: Never   Vaping Use    Vaping Use: Never used   Substance and Sexual Activity    Alcohol use: Yes     Comment: once monthly    Drug use: Never     Social Determinants of Health     Physical Activity: Insufficiently Active    Days of Exercise per Week: 2 days    Minutes of Exercise per Session: 30 min       Anesthesia Complications: None  History of abnormal bleeding : None  History of Blood Transfusions: no  Health Care Directive or Living Will: no    Objective:     ROS:   Feeling well. No dyspnea or chest pain on exertion. No abdominal pain, change in bowel habits, black or bloody stools. No urinary tract symptoms. GYN ROS: no breast pain or new or enlarging lumps on self exam, no vaginal bleeding. No neurological complaints.     OBJECTIVE: The patient appears well, alert, oriented x 3, in no distress. Visit Vitals  /68 (BP 1 Location: Left upper arm, BP Patient Position: Sitting, BP Cuff Size: Large adult)   Pulse 61   Ht 5' 2.5\" (1.588 m)   Wt 207 lb (93.9 kg)   SpO2 97%   BMI 37.26 kg/m²     HEENT:ENT normal.  Neck supple. No adenopathy or thyromegaly. RODNEY. Chest: Lungs are clear, good air entry, no wheezes, rhonchi or rales. Cardiovascular: S1 and S2 normal, no murmurs, regular rate and rhythm. Abdomen: soft without tenderness, guarding, mass or organomegaly. Extremities: show no edema, normal peripheral pulses. Neurological: is normal, no focal findings. DIAGNOSTICS:   1. EKG: EKG FINDINGS - unchanged from previous tracings, sinus bradycardia  2. Labs:   Lab Results   Component Value Date/Time    WBC 5.3 08/03/2022 09:49 AM    HGB 11.8 08/03/2022 09:49 AM    HCT 37.1 08/03/2022 09:49 AM    PLATELET 200 60/36/0360 09:49 AM    MCV 92.5 08/03/2022 09:49 AM     Lab Results   Component Value Date/Time    Hemoglobin A1c 6.7 (H) 08/03/2022 09:49 AM    Hemoglobin A1c 7.4 (H) 02/10/2022 09:25 AM    Hemoglobin A1c, External 7.0 06/17/2021 12:00 AM    Glucose 112 (H) 08/03/2022 09:49 AM    Microalbumin/Creat ratio (mg/g creat) 10 02/10/2022 09:25 AM    Microalbumin,urine random 0.78 02/10/2022 09:25 AM    LDL, calculated 41.8 02/10/2022 09:25 AM    Creatinine (POC) 0.70 05/06/2022 03:53 PM    Creatinine 0.70 08/03/2022 09:49 AM      Lab Results   Component Value Date/Time    Cholesterol, total 125 02/10/2022 09:25 AM    HDL Cholesterol 68 02/10/2022 09:25 AM    LDL, calculated 41.8 02/10/2022 09:25 AM    Triglyceride 76 02/10/2022 09:25 AM    CHOL/HDL Ratio 1.8 02/10/2022 09:25 AM     Lab Results   Component Value Date/Time    ALT (SGPT) 16 08/03/2022 09:49 AM    Alk.  phosphatase 85 08/03/2022 09:49 AM    Bilirubin, total 1.0 08/03/2022 09:49 AM    Albumin 3.7 08/03/2022 09:49 AM    Protein, total 7.5 08/03/2022 09:49 AM    INR 0.94 08/20/2020 11:00 AM    Prothrombin time 10.5 08/20/2020 11:00 AM    PLATELET 260 36/21/3240 09:49 AM       Lab Results   Component Value Date/Time    GFR est non-AA >60 08/03/2022 09:49 AM    GFRNA, POC >60 05/06/2022 03:53 PM    GFR est AA >60 08/03/2022 09:49 AM    GFRAA, POC >60 05/06/2022 03:53 PM    Creatinine 0.70 08/03/2022 09:49 AM    Creatinine (POC) 0.70 05/06/2022 03:53 PM    BUN 12 08/03/2022 09:49 AM    Sodium 139 08/03/2022 09:49 AM    Potassium 4.1 08/03/2022 09:49 AM    Chloride 104 08/03/2022 09:49 AM    CO2 32 08/03/2022 09:49 AM    PTH, Intact 44 06/07/2022 10:38 AM     Lab Results   Component Value Date/Time    TSH 1.920 06/07/2022 10:38 AM      Diagnoses and all orders for this visit:    1. Primary osteoarthritis of left knee  L TKR planned 8/10/22 [facility]    2. Pre-op evaluation  See impression    3. Advanced care planning/counseling discussion  No advanced directive- form given, patient will complete prior to surgery and bring to hospital with her    4. Controlled type 2 diabetes mellitus without complication, without long-term current use of insulin (HCC)  A1c at goal  Continue metformin- follow surgeon or anesthesia's instructions for holding prior to surgery    5. Chronic diastolic congestive heart failure (HCC)  No acute symptoms  Continue current meds (losartan, furosemide)  F/up with cardiology (Dr. Hood D eLeon) as scheduled    6. Essential hypertension  At goal  Continue losartan at current dose    7. Heart murmur, systolic  Echo March 8406    Left Ventricle: Left ventricle size is normal. Normal wall thickness. Normal wall motion. Normal left ventricular systolic function. EF by 2D Simpsons Biplane is 63%. Normal diastolic function for age. Mitral Valve: Mildly thickened leaflet. Mild annular calcification of the mitral valve.       IMPRESSION:   Congestive heart failure  Diabetes mellitus  At acceptable risk for planned surgery  No contraindications to planned surgery    Follow-up and Dispositions    Return in about 3 months (around 11/5/2022), or if symptoms worsen or fail to improve, for diabetes, blood pressure. I have discussed the diagnosis with the patient and the intended plan as seen in the above orders. The patient has received an after-visit summary and questions were answered concerning future plans. Patient conveyed understanding of the plan at the time of the visit.     Meghan Conley NP   8/5/2022

## 2022-08-09 ENCOUNTER — ANESTHESIA EVENT (OUTPATIENT)
Dept: SURGERY | Age: 62
End: 2022-08-09
Payer: COMMERCIAL

## 2022-08-10 ENCOUNTER — HOSPITAL ENCOUNTER (OUTPATIENT)
Age: 62
Discharge: HOME HEALTH | End: 2022-08-12
Attending: ORTHOPAEDIC SURGERY | Admitting: ORTHOPAEDIC SURGERY
Payer: COMMERCIAL

## 2022-08-10 ENCOUNTER — APPOINTMENT (OUTPATIENT)
Dept: GENERAL RADIOLOGY | Age: 62
End: 2022-08-10
Attending: ORTHOPAEDIC SURGERY
Payer: COMMERCIAL

## 2022-08-10 ENCOUNTER — ANESTHESIA (OUTPATIENT)
Dept: SURGERY | Age: 62
End: 2022-08-10
Payer: COMMERCIAL

## 2022-08-10 DIAGNOSIS — M17.12 ARTHRITIS OF LEFT KNEE: Primary | ICD-10-CM

## 2022-08-10 LAB
GLUCOSE BLD STRIP.AUTO-MCNC: 110 MG/DL (ref 65–117)
SERVICE CMNT-IMP: NORMAL

## 2022-08-10 PROCEDURE — 74011000258 HC RX REV CODE- 258: Performed by: ORTHOPAEDIC SURGERY

## 2022-08-10 PROCEDURE — 74011250636 HC RX REV CODE- 250/636: Performed by: ANESTHESIOLOGY

## 2022-08-10 PROCEDURE — 74011250636 HC RX REV CODE- 250/636: Performed by: ORTHOPAEDIC SURGERY

## 2022-08-10 PROCEDURE — 74011250637 HC RX REV CODE- 250/637: Performed by: ANESTHESIOLOGY

## 2022-08-10 PROCEDURE — 64447 NJX AA&/STRD FEMORAL NRV IMG: CPT

## 2022-08-10 PROCEDURE — 77030007866 HC KT SPN ANES BBMI -B

## 2022-08-10 PROCEDURE — 74011000250 HC RX REV CODE- 250: Performed by: ORTHOPAEDIC SURGERY

## 2022-08-10 PROCEDURE — 77030003601 HC NDL NRV BLK BBMI -A

## 2022-08-10 PROCEDURE — 2709999900 HC NON-CHARGEABLE SUPPLY: Performed by: ORTHOPAEDIC SURGERY

## 2022-08-10 PROCEDURE — C1713 ANCHOR/SCREW BN/BN,TIS/BN: HCPCS | Performed by: ORTHOPAEDIC SURGERY

## 2022-08-10 PROCEDURE — C1776 JOINT DEVICE (IMPLANTABLE): HCPCS | Performed by: ORTHOPAEDIC SURGERY

## 2022-08-10 PROCEDURE — 74011250637 HC RX REV CODE- 250/637: Performed by: ORTHOPAEDIC SURGERY

## 2022-08-10 PROCEDURE — 77030040922 HC BLNKT HYPOTHRM STRY -A

## 2022-08-10 PROCEDURE — 74011250636 HC RX REV CODE- 250/636: Performed by: NURSE ANESTHETIST, CERTIFIED REGISTERED

## 2022-08-10 PROCEDURE — 77030000032 HC CUF TRNQT ZIMM -B: Performed by: ORTHOPAEDIC SURGERY

## 2022-08-10 PROCEDURE — 73560 X-RAY EXAM OF KNEE 1 OR 2: CPT

## 2022-08-10 PROCEDURE — 77030018723 HC ELCTRD BLD COVD -A: Performed by: ORTHOPAEDIC SURGERY

## 2022-08-10 PROCEDURE — 74011000250 HC RX REV CODE- 250: Performed by: ANESTHESIOLOGY

## 2022-08-10 PROCEDURE — 76210000036 HC AMBSU PH I REC 1.5 TO 2 HR: Performed by: ORTHOPAEDIC SURGERY

## 2022-08-10 PROCEDURE — 77030033067 HC SUT PDO STRATFX SPIR J&J -B: Performed by: ORTHOPAEDIC SURGERY

## 2022-08-10 PROCEDURE — 77030040361 HC SLV COMPR DVT MDII -B

## 2022-08-10 PROCEDURE — 77030006835 HC BLD SAW SAG STRY -B: Performed by: ORTHOPAEDIC SURGERY

## 2022-08-10 PROCEDURE — 77030028907 HC WRP KNEE WO BGS SOLM -B

## 2022-08-10 PROCEDURE — 77030002933 HC SUT MCRYL J&J -A: Performed by: ORTHOPAEDIC SURGERY

## 2022-08-10 PROCEDURE — 97116 GAIT TRAINING THERAPY: CPT

## 2022-08-10 PROCEDURE — 76060000064 HC AMB SURG ANES 2 TO 2.5 HR: Performed by: ORTHOPAEDIC SURGERY

## 2022-08-10 PROCEDURE — 77030031139 HC SUT VCRL2 J&J -A: Performed by: ORTHOPAEDIC SURGERY

## 2022-08-10 PROCEDURE — 97161 PT EVAL LOW COMPLEX 20 MIN: CPT

## 2022-08-10 PROCEDURE — 77030038692 HC WND DEB SYS IRMX -B: Performed by: ORTHOPAEDIC SURGERY

## 2022-08-10 PROCEDURE — 74011000250 HC RX REV CODE- 250: Performed by: NURSE ANESTHETIST, CERTIFIED REGISTERED

## 2022-08-10 PROCEDURE — 76030000021 HC AMB SURG 2 TO 2.5 HR INTENSV-TIER 1: Performed by: ORTHOPAEDIC SURGERY

## 2022-08-10 PROCEDURE — 77030040393 HC DRSG OPTIFOAM GENT MDII -B: Performed by: ORTHOPAEDIC SURGERY

## 2022-08-10 PROCEDURE — 77030041690 HC SYS PINNING KN JNJ -D: Performed by: ORTHOPAEDIC SURGERY

## 2022-08-10 PROCEDURE — 82962 GLUCOSE BLOOD TEST: CPT

## 2022-08-10 DEVICE — KNEE K1 TOT HEMI STD CEM IMPL CAPPED SYNTHES: Type: IMPLANTABLE DEVICE | Status: FUNCTIONAL

## 2022-08-10 DEVICE — ATTUNE PATELLA MEDIALIZED DOME 35MM CEMENTED AOX
Type: IMPLANTABLE DEVICE | Site: KNEE | Status: FUNCTIONAL
Brand: ATTUNE

## 2022-08-10 DEVICE — ATTUNE KNEE SYSTEM TIBIAL INSERT FIXED BEARING POSTERIOR STABILIZED 6 6MM AOX
Type: IMPLANTABLE DEVICE | Site: KNEE | Status: FUNCTIONAL
Brand: ATTUNE

## 2022-08-10 DEVICE — ATTUNE KNEE SYSTEM REVISION FIXED BEARING TIBIAL BASE CEMENTED SIZE 5
Type: IMPLANTABLE DEVICE | Site: KNEE | Status: FUNCTIONAL
Brand: ATTUNE

## 2022-08-10 DEVICE — CEMENT BNE 40GM FULL DOSE PMMA W/O ANTIBIO M VISC RADPQ: Type: IMPLANTABLE DEVICE | Site: KNEE | Status: FUNCTIONAL

## 2022-08-10 DEVICE — ATTUNE KNEE SYSTEM FEMORAL POSTERIOR STABILIZED SIZE 6 LEFT CEMENTED
Type: IMPLANTABLE DEVICE | Site: KNEE | Status: FUNCTIONAL
Brand: ATTUNE

## 2022-08-10 DEVICE — ATTUNE KNEE SYSTEM REVISION CEMENTED STEM CEMENTED 14X30MM
Type: IMPLANTABLE DEVICE | Site: KNEE | Status: FUNCTIONAL
Brand: ATTUNE

## 2022-08-10 RX ORDER — LIDOCAINE HYDROCHLORIDE 10 MG/ML
0.1 INJECTION, SOLUTION EPIDURAL; INFILTRATION; INTRACAUDAL; PERINEURAL AS NEEDED
Status: DISCONTINUED | OUTPATIENT
Start: 2022-08-10 | End: 2022-08-12 | Stop reason: HOSPADM

## 2022-08-10 RX ORDER — LOSARTAN POTASSIUM 25 MG/1
50 TABLET ORAL EVERY MORNING
Status: DISCONTINUED | OUTPATIENT
Start: 2022-08-11 | End: 2022-08-12 | Stop reason: HOSPADM

## 2022-08-10 RX ORDER — FENTANYL CITRATE 50 UG/ML
INJECTION, SOLUTION INTRAMUSCULAR; INTRAVENOUS AS NEEDED
Status: DISCONTINUED | OUTPATIENT
Start: 2022-08-10 | End: 2022-08-10 | Stop reason: HOSPADM

## 2022-08-10 RX ORDER — ONDANSETRON 2 MG/ML
4 INJECTION INTRAMUSCULAR; INTRAVENOUS
Status: DISPENSED | OUTPATIENT
Start: 2022-08-10 | End: 2022-08-11

## 2022-08-10 RX ORDER — KETOROLAC TROMETHAMINE 30 MG/ML
15 INJECTION, SOLUTION INTRAMUSCULAR; INTRAVENOUS EVERY 6 HOURS
Status: COMPLETED | OUTPATIENT
Start: 2022-08-10 | End: 2022-08-11

## 2022-08-10 RX ORDER — SODIUM CHLORIDE, SODIUM LACTATE, POTASSIUM CHLORIDE, CALCIUM CHLORIDE 600; 310; 30; 20 MG/100ML; MG/100ML; MG/100ML; MG/100ML
150 INJECTION, SOLUTION INTRAVENOUS CONTINUOUS
Status: DISPENSED | OUTPATIENT
Start: 2022-08-10 | End: 2022-08-11

## 2022-08-10 RX ORDER — ATORVASTATIN CALCIUM 20 MG/1
40 TABLET, FILM COATED ORAL
Status: DISCONTINUED | OUTPATIENT
Start: 2022-08-10 | End: 2022-08-12 | Stop reason: HOSPADM

## 2022-08-10 RX ORDER — AMOXICILLIN 250 MG
1 CAPSULE ORAL 2 TIMES DAILY
Status: DISCONTINUED | OUTPATIENT
Start: 2022-08-10 | End: 2022-08-12 | Stop reason: HOSPADM

## 2022-08-10 RX ORDER — FUROSEMIDE 40 MG/1
40 TABLET ORAL EVERY MORNING
Status: DISCONTINUED | OUTPATIENT
Start: 2022-08-11 | End: 2022-08-12 | Stop reason: HOSPADM

## 2022-08-10 RX ORDER — PROPOFOL 10 MG/ML
INJECTION, EMULSION INTRAVENOUS
Status: DISCONTINUED | OUTPATIENT
Start: 2022-08-10 | End: 2022-08-10 | Stop reason: HOSPADM

## 2022-08-10 RX ORDER — SODIUM CHLORIDE 0.9 % (FLUSH) 0.9 %
5-40 SYRINGE (ML) INJECTION AS NEEDED
Status: DISCONTINUED | OUTPATIENT
Start: 2022-08-10 | End: 2022-08-12 | Stop reason: HOSPADM

## 2022-08-10 RX ORDER — ACETAMINOPHEN 325 MG/1
975 TABLET ORAL ONCE
Status: COMPLETED | OUTPATIENT
Start: 2022-08-10 | End: 2022-08-10

## 2022-08-10 RX ORDER — DIPHENHYDRAMINE HYDROCHLORIDE 50 MG/ML
12.5 INJECTION, SOLUTION INTRAMUSCULAR; INTRAVENOUS AS NEEDED
Status: DISCONTINUED | OUTPATIENT
Start: 2022-08-10 | End: 2022-08-10 | Stop reason: HOSPADM

## 2022-08-10 RX ORDER — HYDROMORPHONE HYDROCHLORIDE 1 MG/ML
0.5 INJECTION, SOLUTION INTRAMUSCULAR; INTRAVENOUS; SUBCUTANEOUS
Status: DISPENSED | OUTPATIENT
Start: 2022-08-10 | End: 2022-08-11

## 2022-08-10 RX ORDER — SODIUM CHLORIDE, SODIUM LACTATE, POTASSIUM CHLORIDE, CALCIUM CHLORIDE 600; 310; 30; 20 MG/100ML; MG/100ML; MG/100ML; MG/100ML
125 INJECTION, SOLUTION INTRAVENOUS CONTINUOUS
Status: DISCONTINUED | OUTPATIENT
Start: 2022-08-10 | End: 2022-08-10 | Stop reason: HOSPADM

## 2022-08-10 RX ORDER — TRANEXAMIC ACID 100 MG/ML
INJECTION, SOLUTION INTRAVENOUS AS NEEDED
Status: DISCONTINUED | OUTPATIENT
Start: 2022-08-10 | End: 2022-08-10 | Stop reason: HOSPADM

## 2022-08-10 RX ORDER — LIDOCAINE HYDROCHLORIDE 20 MG/ML
INJECTION, SOLUTION INFILTRATION; PERINEURAL AS NEEDED
Status: DISCONTINUED | OUTPATIENT
Start: 2022-08-10 | End: 2022-08-10 | Stop reason: HOSPADM

## 2022-08-10 RX ORDER — FACIAL-BODY WIPES
10 EACH TOPICAL DAILY PRN
Status: DISCONTINUED | OUTPATIENT
Start: 2022-08-12 | End: 2022-08-12 | Stop reason: HOSPADM

## 2022-08-10 RX ORDER — CELECOXIB 100 MG/1
100 CAPSULE ORAL ONCE
Status: COMPLETED | OUTPATIENT
Start: 2022-08-10 | End: 2022-08-10

## 2022-08-10 RX ORDER — METFORMIN HYDROCHLORIDE 500 MG/1
500 TABLET ORAL 2 TIMES DAILY WITH MEALS
Status: DISCONTINUED | OUTPATIENT
Start: 2022-08-10 | End: 2022-08-12 | Stop reason: HOSPADM

## 2022-08-10 RX ORDER — SODIUM CHLORIDE 9 MG/ML
125 INJECTION, SOLUTION INTRAVENOUS CONTINUOUS
Status: DISPENSED | OUTPATIENT
Start: 2022-08-10 | End: 2022-08-11

## 2022-08-10 RX ORDER — NALOXONE HYDROCHLORIDE 0.4 MG/ML
0.4 INJECTION, SOLUTION INTRAMUSCULAR; INTRAVENOUS; SUBCUTANEOUS AS NEEDED
Status: DISCONTINUED | OUTPATIENT
Start: 2022-08-10 | End: 2022-08-12 | Stop reason: HOSPADM

## 2022-08-10 RX ORDER — ACETAMINOPHEN 500 MG
1000 TABLET ORAL EVERY 6 HOURS
Status: DISCONTINUED | OUTPATIENT
Start: 2022-08-10 | End: 2022-08-12 | Stop reason: HOSPADM

## 2022-08-10 RX ORDER — POLYETHYLENE GLYCOL 3350 17 G/17G
17 POWDER, FOR SOLUTION ORAL DAILY
Status: DISCONTINUED | OUTPATIENT
Start: 2022-08-10 | End: 2022-08-12 | Stop reason: HOSPADM

## 2022-08-10 RX ORDER — SODIUM CHLORIDE 0.9 % (FLUSH) 0.9 %
5-40 SYRINGE (ML) INJECTION EVERY 8 HOURS
Status: DISCONTINUED | OUTPATIENT
Start: 2022-08-10 | End: 2022-08-12 | Stop reason: HOSPADM

## 2022-08-10 RX ORDER — ALBUTEROL SULFATE 0.83 MG/ML
2.5 SOLUTION RESPIRATORY (INHALATION) AS NEEDED
Status: DISCONTINUED | OUTPATIENT
Start: 2022-08-10 | End: 2022-08-10 | Stop reason: HOSPADM

## 2022-08-10 RX ORDER — HYDROMORPHONE HYDROCHLORIDE 2 MG/1
2 TABLET ORAL
Status: DISCONTINUED | OUTPATIENT
Start: 2022-08-10 | End: 2022-08-11

## 2022-08-10 RX ORDER — PROPOFOL 10 MG/ML
INJECTION, EMULSION INTRAVENOUS AS NEEDED
Status: DISCONTINUED | OUTPATIENT
Start: 2022-08-10 | End: 2022-08-10 | Stop reason: HOSPADM

## 2022-08-10 RX ORDER — HYDROMORPHONE HYDROCHLORIDE 2 MG/1
4 TABLET ORAL
Status: DISCONTINUED | OUTPATIENT
Start: 2022-08-10 | End: 2022-08-11

## 2022-08-10 RX ORDER — ROPIVACAINE HYDROCHLORIDE 5 MG/ML
INJECTION, SOLUTION EPIDURAL; INFILTRATION; PERINEURAL AS NEEDED
Status: DISCONTINUED | OUTPATIENT
Start: 2022-08-10 | End: 2022-08-10 | Stop reason: HOSPADM

## 2022-08-10 RX ORDER — KETOROLAC TROMETHAMINE 30 MG/ML
15 INJECTION, SOLUTION INTRAMUSCULAR; INTRAVENOUS
Status: COMPLETED | OUTPATIENT
Start: 2022-08-10 | End: 2022-08-10

## 2022-08-10 RX ORDER — MIDAZOLAM HYDROCHLORIDE 1 MG/ML
INJECTION, SOLUTION INTRAMUSCULAR; INTRAVENOUS AS NEEDED
Status: DISCONTINUED | OUTPATIENT
Start: 2022-08-10 | End: 2022-08-10 | Stop reason: HOSPADM

## 2022-08-10 RX ORDER — BUPIVACAINE HYDROCHLORIDE 5 MG/ML
INJECTION, SOLUTION EPIDURAL; INTRACAUDAL AS NEEDED
Status: DISCONTINUED | OUTPATIENT
Start: 2022-08-10 | End: 2022-08-10 | Stop reason: HOSPADM

## 2022-08-10 RX ORDER — ONDANSETRON 2 MG/ML
4 INJECTION INTRAMUSCULAR; INTRAVENOUS AS NEEDED
Status: DISCONTINUED | OUTPATIENT
Start: 2022-08-10 | End: 2022-08-10 | Stop reason: HOSPADM

## 2022-08-10 RX ORDER — ASPIRIN 81 MG/1
81 TABLET ORAL 2 TIMES DAILY
Status: DISCONTINUED | OUTPATIENT
Start: 2022-08-10 | End: 2022-08-12 | Stop reason: HOSPADM

## 2022-08-10 RX ORDER — HYDROXYZINE HYDROCHLORIDE 10 MG/1
10 TABLET, FILM COATED ORAL
Status: DISCONTINUED | OUTPATIENT
Start: 2022-08-10 | End: 2022-08-12 | Stop reason: HOSPADM

## 2022-08-10 RX ADMIN — POLYETHYLENE GLYCOL 3350 17 G: 17 POWDER, FOR SOLUTION ORAL at 14:20

## 2022-08-10 RX ADMIN — CEFAZOLIN 0.2 G: 10 INJECTION, POWDER, FOR SOLUTION INTRAVENOUS at 07:06

## 2022-08-10 RX ADMIN — SODIUM CHLORIDE, PRESERVATIVE FREE 10 ML: 5 INJECTION INTRAVENOUS at 14:20

## 2022-08-10 RX ADMIN — TRANEXAMIC ACID 1 G: 100 INJECTION, SOLUTION INTRAVENOUS at 07:45

## 2022-08-10 RX ADMIN — FENTANYL CITRATE 50 MCG: 50 INJECTION, SOLUTION INTRAMUSCULAR; INTRAVENOUS at 07:17

## 2022-08-10 RX ADMIN — ACETAMINOPHEN 1000 MG: 500 TABLET ORAL at 19:59

## 2022-08-10 RX ADMIN — TRANEXAMIC ACID 1 G: 100 INJECTION, SOLUTION INTRAVENOUS at 09:12

## 2022-08-10 RX ADMIN — LIDOCAINE HYDROCHLORIDE 60 MG: 20 INJECTION, SOLUTION INFILTRATION; PERINEURAL at 07:35

## 2022-08-10 RX ADMIN — PROPOFOL 60 MCG/KG/MIN: 10 INJECTION, EMULSION INTRAVENOUS at 07:40

## 2022-08-10 RX ADMIN — KETOROLAC TROMETHAMINE 15 MG: 30 INJECTION, SOLUTION INTRAMUSCULAR; INTRAVENOUS at 10:37

## 2022-08-10 RX ADMIN — HYDROMORPHONE HYDROCHLORIDE 4 MG: 2 TABLET ORAL at 14:20

## 2022-08-10 RX ADMIN — ASPIRIN 81 MG: 81 TABLET, COATED ORAL at 18:02

## 2022-08-10 RX ADMIN — BUPIVACAINE HYDROCHLORIDE 2.4 ML: 5 INJECTION, SOLUTION EPIDURAL; INTRACAUDAL; PERINEURAL at 07:26

## 2022-08-10 RX ADMIN — HYDROMORPHONE HYDROCHLORIDE 0.5 MG: 1 INJECTION, SOLUTION INTRAMUSCULAR; INTRAVENOUS; SUBCUTANEOUS at 20:51

## 2022-08-10 RX ADMIN — FENTANYL CITRATE 50 MCG: 50 INJECTION, SOLUTION INTRAMUSCULAR; INTRAVENOUS at 07:07

## 2022-08-10 RX ADMIN — ROPIVACAINE HYDROCHLORIDE 30 ML: 5 INJECTION, SOLUTION EPIDURAL; INFILTRATION; PERINEURAL at 07:17

## 2022-08-10 RX ADMIN — SODIUM CHLORIDE 125 ML/HR: 9 INJECTION, SOLUTION INTRAVENOUS at 18:09

## 2022-08-10 RX ADMIN — ACETAMINOPHEN 1000 MG: 500 TABLET ORAL at 14:19

## 2022-08-10 RX ADMIN — MIDAZOLAM HYDROCHLORIDE 2 MG: 2 INJECTION, SOLUTION INTRAMUSCULAR; INTRAVENOUS at 07:07

## 2022-08-10 RX ADMIN — SENNOSIDES AND DOCUSATE SODIUM 1 TABLET: 50; 8.6 TABLET ORAL at 18:02

## 2022-08-10 RX ADMIN — PROPOFOL 50 MG: 10 INJECTION, EMULSION INTRAVENOUS at 07:35

## 2022-08-10 RX ADMIN — HYDROXYZINE HYDROCHLORIDE 10 MG: 10 TABLET ORAL at 11:59

## 2022-08-10 RX ADMIN — ATORVASTATIN CALCIUM 40 MG: 20 TABLET, FILM COATED ORAL at 23:03

## 2022-08-10 RX ADMIN — ASPIRIN 81 MG: 81 TABLET, COATED ORAL at 11:58

## 2022-08-10 RX ADMIN — CELECOXIB 100 MG: 100 CAPSULE ORAL at 07:00

## 2022-08-10 RX ADMIN — METFORMIN HYDROCHLORIDE 500 MG: 500 TABLET ORAL at 18:02

## 2022-08-10 RX ADMIN — CEFAZOLIN 2 G: 1 INJECTION, POWDER, FOR SOLUTION INTRAMUSCULAR; INTRAVENOUS at 16:36

## 2022-08-10 RX ADMIN — KETOROLAC TROMETHAMINE 15 MG: 30 INJECTION, SOLUTION INTRAMUSCULAR; INTRAVENOUS at 16:36

## 2022-08-10 RX ADMIN — ONDANSETRON 4 MG: 2 INJECTION INTRAMUSCULAR; INTRAVENOUS at 11:58

## 2022-08-10 RX ADMIN — KETOROLAC TROMETHAMINE 15 MG: 30 INJECTION, SOLUTION INTRAMUSCULAR; INTRAVENOUS at 23:03

## 2022-08-10 RX ADMIN — SODIUM CHLORIDE 125 ML/HR: 9 INJECTION, SOLUTION INTRAVENOUS at 12:02

## 2022-08-10 RX ADMIN — SENNOSIDES AND DOCUSATE SODIUM 1 TABLET: 50; 8.6 TABLET ORAL at 14:19

## 2022-08-10 RX ADMIN — HYDROMORPHONE HYDROCHLORIDE 4 MG: 2 TABLET ORAL at 18:30

## 2022-08-10 RX ADMIN — ACETAMINOPHEN 975 MG: 325 TABLET ORAL at 07:00

## 2022-08-10 RX ADMIN — SODIUM CHLORIDE, POTASSIUM CHLORIDE, SODIUM LACTATE AND CALCIUM CHLORIDE 150 ML/HR: 600; 310; 30; 20 INJECTION, SOLUTION INTRAVENOUS at 07:03

## 2022-08-10 NOTE — ANESTHESIA PROCEDURE NOTES
Spinal Block    Start time: 8/10/2022 7:17 AM  End time: 8/10/2022 7:26 AM  Performed by: Richard Santiago CRNA  Authorized by: Nafisa Rubio MD     Pre-procedure:   Indications: at surgeon's request and primary anesthetic  Preanesthetic Checklist: patient identified, risks and benefits discussed, anesthesia consent, site marked, patient being monitored, timeout performed and fire risk safety assessment completed and verbalized    Timeout Time: 07:07 EDT      Spinal Block:   Patient Position:  Seated  Prep Region:  Lumbar  Prep: chlorhexidine      Location:  L3-4  Technique:  Single shot      Med Admin Time: 8/10/2022 7:26 AM    Needle:   Needle Type:  Pencan  Needle Gauge:  25 G  Attempts:  1      Events: CSF confirmed, no blood with aspiration and no paresthesia        Assessment:  Insertion:  Uncomplicated  Patient tolerance:  Patient tolerated the procedure well with no immediate complications

## 2022-08-10 NOTE — PERIOP NOTES
4th floor Charge RN Osman Paredes  RN)  notified of admission/pending return and awaiting receiving ( tba  RN) call-back for assignment to/return to room 417.       11:20 AM  Family / caregiver update provided, questions answered. Location for room 417  provided  after PACU stay complete, directions provided for room location.

## 2022-08-10 NOTE — PROGRESS NOTES
Problem: Mobility Impaired (Adult and Pediatric)  Goal: *Acute Goals and Plan of Care (Insert Text)  Description: FUNCTIONAL STATUS PRIOR TO ADMISSION: Patient was independent and active without use of DME.    HOME SUPPORT PRIOR TO ADMISSION: The patient lived with her spouse, daughter and son in law but did not require assist.    Physical Therapy Goals  Initiated 8/10/2022    1. Patient will move from supine to sit and sit to supine , scoot up and down, and roll side to side in bed with independence within 4 days. 2. Patient will perform sit to stand with modified independence within 4 days. 3. Patient will ambulate with modified independence for 100 feet with the least restrictive device within 4 days. 4. Patient will ascend/descend 10 stairs with 1-2 handrail(s) with minimal assistance/contact guard assist within 4 days. 5. Patient will perform home exercise program per protocol with independence within 4 days. 6. Patient will demonstrate AROM 0-90 degrees in operative joint within 4 days. Outcome: Not Met   PHYSICAL THERAPY EVALUATION  Patient: Christi Rose (26 y.o. female)  Date: 8/10/2022  Primary Diagnosis: Primary osteoarthritis of left knee [M17.12]  Arthritis of left knee [M17.12]  Procedure(s) (LRB):  LEFT TOTAL KNEE REPLACEMENT (SPINAL/REGIONAL BLOCK (Left) Day of Surgery   Precautions:   WBAT, Fall      ASSESSMENT  Based on the objective data described below, the patient presents with good SLR, impaired sensation to light touch of operative extremity, good strength, ROM and overall mildly reduced functional mobility in the setting of hospital admission for L TKA POD0. PTA patient independent and lives at home with her family. Today she demonstrates bed mobility with SBA and tolerates 3 ft ambulation to/from the bedside commode with minAx2. She is unsuccessful for voiding. Reviewed therapeutic exercises as below.      Patient currently displaying decrease in sensory of one leg which is limiting safe mobility. Please continue to assess sensory return and as it returns please advance to transferring to bedside commode with assist of two people, gait belt, and walker for safety. Current Level of Function Impacting Discharge (mobility/balance): Clare    Functional Outcome Measure: The patient scored Total Score: 10/28 on the Tinetti outcome measure which is indicative of high fall risk. Other factors to consider for discharge: none additional     Patient will benefit from skilled therapy intervention to address the above noted impairments. PLAN :  Recommendations and Planned Interventions: bed mobility training, transfer training, gait training, therapeutic exercises, edema management/control, patient and family training/education, and therapeutic activities      Frequency/Duration: Patient will be followed by physical therapy:  twice daily to address goals. Recommendation for discharge: (in order for the patient to meet his/her long term goals)  Outpatient physical therapy follow up recommended for post OP TKA    This discharge recommendation:  Has not yet been discussed the attending provider and/or case management    IF patient discharges home will need the following DME: rolling walker         SUBJECTIVE:   Patient stated I thank you.     OBJECTIVE DATA SUMMARY:   Patient received supine in bed and was agreeable to participate in PT session. Patient was cleared by nursing to participate in PT session. Patient's spouse present for entirety of session.     HISTORY:    Past Medical History:   Diagnosis Date    Arthritis     CHF (congestive heart failure) ([facility] [address]     Diabetes ([address]     Hypertension     MSSA (methicillin-susceptible Staphylococcus aureus) colonization 08/05/2022    [place]    Renal cell cancer, left ([facility] [address] 2020    has had clear scans since partial nephrectomy in 2020    Second degree uterine prolapse 07/12/2012    Uterine leiomyoma 07/18/2012     Past Surgical History:   Procedure Laterality Date    HX HEART CATHETERIZATION  2020    HX HYSTERECTOMY  2006    approx date    HX NEPHRECTOMY  09/09/2020    Robotic assisted laparoscopic left partial nephrectomy    HX OTHER SURGICAL         Personal factors and/or comorbidities impacting plan of care: none additional    Home Situation  Home Environment: Private residence  # Steps to Enter: 4  Rails to Enter: Yes  Hand Rails : Bilateral  One/Two Story Residence: Two story  Living Alone: No  Support Systems: Child(rema) (ANNALISA and daughter)  Current DME Used/Available at Home: Cane, straight, Raised toilet seat, Shower chair  Tub or Shower Type: Tub/Shower combination    EXAMINATION/PRESENTATION/DECISION MAKING:   Critical Behavior:  Neurologic State: Alert  Orientation Level: Oriented X4  Cognition: Appropriate decision making, Follows commands     Hearing:     Skin:  all observed intact; did not observe surgical site  Edema: appropriate POD0 TKA  Range Of Motion:  AROM: Generally decreased, functional        LLE AROM  L Knee Flexion: 90  L Knee Extension: 0        LLE Assessment (WDL): Exception to WDL     Strength:    Strength: Within functional limits              LLE Assessment (WDL): Exception to WDL     Tone & Sensation:   Tone: Normal              Sensation: Impaired (diminished to light touch LLE)        LLE Assessment (WDL): Exception to WDL  LLE Sensation  Light Touch: Partial deficit   Coordination:  Coordination: Within functional limits  Vision:      Functional Mobility:  Bed Mobility:     Supine to Sit: Stand-by assistance; Additional time  Sit to Supine: Stand-by assistance; Additional time  Scooting: Supervision  Transfers:  Sit to Stand: Minimum assistance;Assist x2  Stand to Sit: Contact guard assistance;Assist x2; Additional time                       Balance:   Sitting: Intact; With support  Standing: Impaired; With support  Standing - Static: Good  Standing - Dynamic : Fair;Constant support  Ambulation/Gait Training:  Distance (ft): 3 Feet (ft)  Assistive Device: Gait belt;Walker, rolling  Ambulation - Level of Assistance: Contact guard assistance;Assist x2; Additional time        Gait Abnormalities: Antalgic; Step to gait     Left Side Weight Bearing: As tolerated                            Therapeutic Exercises:   Therapeutic Exercises:       EXERCISE   Sets   Reps   Active Active Assist   Passive Self ROM   Comments   Ankle Pumps 1 10 [x] [] [] []    Quad Sets/ 1 5 [x] [] [] []    Hamstring Sets   [] [] [] []    Short Arc Quads   [] [] [] []    Heel Slides 1 5 [] [x] [] []    Straight Leg Raises 1 5 [x] [] [] []    Hip abd/add 1 3 [x] [] [] []    Long Arc Quads   [] [] [] []    Marching   [] [] [] []       [] [] [] []          Functional Measure:  Tinetti test:    Sitting Balance: 1  Arises: 0  Attempts to Rise: 0  Immediate Standing Balance: 1  Standing Balance: 1  Nudged: 1  Eyes Closed: 0  Turn 360 Degrees - Continuous/Discontinuous: 0  Turn 360 Degrees - Steady/Unsteady: 0  Sitting Down: 1  Balance Score: 5 Balance total score  Indication of Gait: 1  R Step Length/Height: 0  L Step Length/Height: 1  R Foot Clearance: 1  L Foot Clearance: 1  Step Symmetry: 0  Step Continuity: 0  Path: 1  Trunk: 0  Walking Time: 0  Gait Score: 5 Gait total score  Total Score: 10/28 Overall total score         Tinetti Tool Score Risk of Falls  <19 = High Fall Risk  19-24 = Moderate Fall Risk  25-28 = Low Fall Risk  Tinetti ME. Performance-Oriented Assessment of Mobility Problems in Elderly Patients. [address]; [id number].  (Scoring Description: PT Bulletin Feb. 10, 1993)    Older adults: Woodrow Gan et al, 2009; n = 1000 [facility] elderly evaluated with ABC, JANETH, ADL, and IADL)  · Mean JANETH score for males aged 69-68 years = 26.21(3.40)  · Mean JANETH score for females age 69-68 years = 25.16(4.30)  · Mean JANETH score for males over 80 years = 23.29(6.02)  · Mean JANETH score for females over 80 years = 17.20(8.32)        Physical Therapy Evaluation Charge Determination   History Examination Presentation Decision-Making   LOW Complexity : Zero comorbidities / personal factors that will impact the outcome / POC LOW Complexity : 1-2 Standardized tests and measures addressing body structure, function, activity limitation and / or participation in recreation  LOW Complexity : Stable, uncomplicated  LOW Complexity : FOTO score of       Based on the above components, the patient evaluation is determined to be of the following complexity level: LOW     Pain Rating:  Patient with minimal complaints of pain during therapy; she notes pain emerging at proximal calf muscle       Activity Tolerance:   Good and tolerates ADLs without rest breaks      After treatment patient left in no apparent distress:   Supine in bed, Heels elevated for pressure relief, Call bell within reach, Bed / chair alarm activated, Caregiver / family present, and Side rails x 3    COMMUNICATION/EDUCATION:   The patients plan of care was discussed with: Registered nurse and Certified nursing assistant/patient care technician. Fall prevention education was provided and the patient/caregiver indicated understanding. and Patient/family have participated as able in goal setting and plan of care.     Thank you for this referral.  Chris Tello PT, DPT   Time Calculation: 23 mins

## 2022-08-10 NOTE — ANESTHESIA POSTPROCEDURE EVALUATION
Procedure(s):  LEFT TOTAL KNEE REPLACEMENT (SPINAL/REGIONAL BLOCK.    regional, spinal, general - backup    Anesthesia Post Evaluation      Multimodal analgesia: multimodal analgesia used between 6 hours prior to anesthesia start to PACU discharge  Patient location during evaluation: bedside  Patient participation: complete - patient participated  Level of consciousness: awake  Pain score: 1  Pain management: adequate  Airway patency: patent  Anesthetic complications: no  Cardiovascular status: acceptable  Respiratory status: acceptable  Hydration status: acceptable  Comments: Spinal block receding approproately  Post anesthesia nausea and vomiting:  none  Final Post Anesthesia Temperature Assessment:  Normothermia (36.0-37.5 degrees C)      INITIAL Post-op Vital signs:   Vitals Value Taken Time   /66 08/10/22 1055   Temp 36.4 °C (97.5 °F) 08/10/22 1045   Pulse 59 08/10/22 1059   Resp 12 08/10/22 1059   SpO2 98 % 08/10/22 1059   Vitals shown include unvalidated device data.

## 2022-08-10 NOTE — OP NOTES
OPERATIVE REPORT    FACILITY: [facility]    PATIENT NAME: Nettie Manuel     DATE OF OPERATION: 8/10/2022    PREOPERATIVE DIAGNOSIS: Left knee arthritis    POSTOPERATIVE DIAGNOSIS: same    OPERATIVE PROCEDURE:   1. Cemented left total knee arthroplasty, CPT 08570    ATTENDING PHYSICIAN: Radha Francois. Carmen Sarabia MD    ASSISTANT:   Ron Pedraza SA  [place], [place]    IMPLANTS:    Implant Name Type Inv. Item Serial No.  Lot No. LRB No. Used Action   PAT MAYCOL DOME MEDIAL 35MM -- ATTUNE - SN/A  PAT MAYCOL DOME MEDIAL 35MM -- ATTUNE N/A [facility] [employer] [facility] [id number] Left 1 Implanted   COMPONENT FEM SZ 6 L KNEE POST STBL MAYCOL ATTUNE - SN/A  COMPONENT FEM SZ 6 L KNEE POST STBL MAYCOL ATTUNE N/A [facility] [employer] [facility] [id number] Left 1 Implanted   BASEPLATE TIB SZ 5 FIX BEAR CO CHROM MOLYBDENUM TI ALLY END - SN/A  BASEPLATE TIB SZ 5 FIX BEAR CO CHROM MOLYBDENUM TI ALLY END N/A [facility] [employer] [facility] [id number] Left 1 Implanted   STEM FEM [id number] MAYCOL ATTUNE - SN/A  STEM FEM [id number] MAYCOL ATTUNE N/A [facility] [employer] [facility] [id number] Left 1 Implanted   CEMENT BNE 40GM FULL DOSE PMMA W/O ANTIBIO M VISC RADPQ - SN/A  CEMENT BNE 40GM FULL DOSE PMMA W/O ANTIBIO M VISC RADPQ N/A [employer] [facility] [id number] Left 2 Implanted   INSERT TIB SZ 6 THK6MM KNEE CRUCE RET FIX BEAR ATTUNE - SN/A  INSERT TIB SZ 6 THK6MM KNEE CRUCE RET FIX BEAR ATTUNE N/A [facility] [employer] [facility] [id number] Left 1 Implanted         SPECIMENS:  None    OPERATIVE FINDINGS: Advanced arthritis left knee    ANESTHESIA:   Spinal anesthetic, adductor canal block, and conscious sedation. FLUIDS: Please see anesthesia record    ESTIMATED BLOOD LOSS: 150cc    TOURNIQUET TIME: 28 minutes at 300mm Hg, elevated for cementation only    INDICATIONS FOR PROCEDURE:   Nettie Manuel is a 57 y.o. female who presented to clinic with left knee pain. Radiographs demonstrated advanced arthritic changes of the affected knee.  They were counseled on the risks, benefits, and alternatives to surgery. Risks were outlined to include, but were not limited to: bleeding, infection, fracture, damage to blood vessels or nerves, hardware failure, loosening, continued pain, stiffness, leg length inequality, and medical risks including heart attack, stroke, blood clot, and even death. The patient elected to continue with the operation, and gave informed consent to do so. DETAILED DESCRIPTION OF PROCEDURE:   After anesthesia was induced, the patient was positioned on the table and a tourniquet was placed. The patient's left lower extremity was prepped and draped in the usual fashion. Appropriate timeouts were performed verifying the correct patient, side, and operation. Preoperative antibiotics were administered. A midline anterior incision was made over the operative knee. Skin and subcutaneous tissue were dissected to the deep fascia. The VMO, patella, and patellar tendon were identified. The planned site of the arthrotomy was injected with the anesthetic cocktail. A medial parapatellar approach was made leaving appropriate medial cuff of tissue for later repair. Hemostasis was obtained with electrocautery. The medial meniscus was identified and the anterior horn was incised. The medial soft tissue release was completed to the midcoronal plane of the tibia. The lateral soft tissues were cleared from behind the patellar tendon, and the fat pad was removed, leaving about a 1cm cuff. The anterior horn of the lateral meniscus was released. The ACL and PCL were transsected. The anterior suprapatellar fat pad was removed to allow visualization of the anterior femoral cortex. The intramedullary femoral guide was placed in the intramedullary canal. The femoral jig was positioned for a 5 degree valgus cut. This was completed with a saw. Femoral osteophytes were identified and removed with an osteotome and rongeur.      Attention was then turned to the tibia. A posterior retractor was inserted and the tibia subluxed forward. The medial and lateral meniscectomies were completed. The tibial jig was placed in an extramedullary fashion to achieve a tibial cut with neutral varus-valgus inclination and a 0-3 degree posterior slope. This was completed with a saw and the tibia was sized. The femur was then preliminarily sized as well. The extension gap was then measured, and releases to complete the appropriate balance were deep MCL, posterior oblique ligament, semimembranosus, and osteophyte removal. After these releases, the alignment of the limb was assessed and found to be grossly satisfactory. The knee was placed in 90 degrees of flexion. The transepicondylar axis was marked as a secondary landmark for femoral rotation position. The flexion gap was tensioned and the femoral cutting jig was placed using a gap balancing technique. Using the femoral cutting jig, the anterior, posterior, and anterior and posterior chamfer cuts were made with the saw, being careful to avoid the nearby soft tissues. After these cuts were made, the posterior osteophytes were removed with an osteotome. The box cutting guide was placed on the cut distal femur and was centered from medial to lateral. The box cut was made with a sagittal saw. Remaining notch osteophytes were identified and removed with a rongeur. Tibial retractors were then re-placed. The tibial preparation guide was then placed over the cut tibial surface and the tibial bone was prepared with the keel punch and drill to the appropriate depth for the designated size. Trials were placed and balance was assessed. Attention was then turned to the patella. The backside of the patellar and quadriceps tendon were cleared. The patellar height was measured, and using a saw, the patellar cut was made giving a cut that would give equal medial-lateral and anterior-posterior widths.  The patellar guide was placed and the drill holes made through the guide. The patellar trial was placed and tracking was checked and found to be appropriate. The lateral facet of the patella was denervated with electrocautery and any overhanging patellar osteophytes were removed. Then, with components in place, the limb was exsanguinated. Using pulse lavage, the prepared bony surfaces were cleaned of clot and blood. They were then dried using a lap sponge. The posterior capsule was injected with the anesthetic cocktail. The femoral canal was plugged using excess resected bone plug. Sclerotic bone was drilled with a small drill bit to allow cement interdigitation. The tibial bone was cleaned with a lap in preparation for cementing. The cement was placed over the dried bony surfaces and pressurized into the tibia. The tibial component was inserted and excess cement cleared. A lap was placed over the tibia, and the femur was cemented in similar fashion. The femoral component was placed and the trial liner was placed. The patella was cleaned and cement placed, followed by the real patellar component. The cement was allowed to harden in extension. Then, once the cement was hardened, the trial liner was removed and the real liner placed. The medial and lateral femoral periosteum was injected with the remainder of the anesthetic cocktail. The tourniquet was let down and hemostasis was obtained. The knee was washed with pulse lavage. Closure was done in routine fashion in layers: the arthrotomy was closed with #2 Quill. Then, the subcutaneous tissue was closed with 2-0 vicryl. Finally, the skin was closed with  running 3-0 monocryl. A sterile bandage was placed. The patient was awoken without complication. All counts were correct at the conclusion of the case. DISPOSITION: Stable to PACU. Radiographs to be taken there. POSTOPERATIVE PLAN: The plan will be to admit the patient to outpatient status.  There they will undergo physical therapy and will mobilize as able. Pain will be controlled with oral and IV medications. DVT prophylaxis will be stratified based on risk factors. After the patient has mobilized appropriately, they will be discharged, with the plan for outpatient physical therapy to continue on a regular basis. FOLLOW UP: We will plan to see the patient back in clinic approximately 2 weeks after surgery for a wound check and follow up on clinical progress.      Inocencia Sweeney MD

## 2022-08-10 NOTE — PROGRESS NOTES
19:30  Bedside and Verbal shift change report given to Danial Lennox (oncoming nurse) by Dwayne Tanner and Rocio Menezes (offgoing nurse). Report included the following information SBAR, Procedure Summary, Intake/Output, MAR, and Recent Results.

## 2022-08-10 NOTE — ANESTHESIA PROCEDURE NOTES
Peripheral Block    Start time: 8/10/2022 7:07 AM  End time: 8/10/2022 7:17 AM  Performed by: Lisa Pan CRNA  Authorized by: Jennifer Gorman MD       Pre-procedure: Indications: at surgeon's request and post-op pain management    Preanesthetic Checklist: patient identified, risks and benefits discussed, site marked, timeout performed, anesthesia consent given, patient being monitored and fire risk safety assessment completed and verbalized    Timeout Time: 07:07 EDT      Block Type:   Block Type:   Adductor canal block  Laterality:  Left  Monitoring:  Continuous pulse ox, frequent vital sign checks, heart rate and responsive to questions  Injection Technique:  Single shot  Procedures: ultrasound guided    Patient Position: supine  Prep: chlorhexidine    Location:  Lower thigh  Needle Type:  Stimuplex  Needle Gauge:  21 G  Needle Localization:  Ultrasound guidance  Med Admin Time: 8/10/2022 7:17 AM    Assessment:  Number of attempts:  1  Injection Assessment:  Incremental injection every 5 mL, local visualized surrounding nerve on ultrasound, negative aspiration for blood, no paresthesia and no intravascular symptoms  Patient tolerance:  Patient tolerated the procedure well with no immediate complications

## 2022-08-10 NOTE — INTERVAL H&P NOTE
Update History & Physical    The Patient's History and Physical of August 5,  was reviewed with the patient and I examined the patient. There was no change. The surgical site was confirmed by the patient and me. Plan:  The risk, benefits, expected outcome, and alternative to the recommended procedure have been discussed with the patient. Patient understands and wants to proceed with the procedure.     Electronically signed by Severo Laughter, MD on 8/10/2022 at 6:41 AM

## 2022-08-10 NOTE — BRIEF OP NOTE
Brief Postoperative Note    Patient: Sharon Sidhu  YOB: 1960  MRN: [id number]    Date of Procedure: 8/10/2022     Pre-Op Diagnosis: Primary osteoarthritis of left knee [M17.12]    Post-Op Diagnosis: Same as preoperative diagnosis.       Procedure(s):  LEFT TOTAL KNEE REPLACEMENT (SPINAL/REGIONAL BLOCK    Surgeon(s):  Aubrie Meléndez MD    Surgical Assistant: None    Anesthesia: Spinal     Estimated Blood Loss (mL): 668ER    Complications: None    Specimens: * No specimens in log *     Implants: * No implants in log *    Drains: * No LDAs found *    Electronically Signed by Rosmery Pichardo MD on 8/10/2022 at 6:41 AM

## 2022-08-10 NOTE — PROGRESS NOTES
TRANSFER - OUT REPORT:    Verbal report given to Meryl Chambers on Truemckayla Conwaylee being transferred to 4th floor room 417 for routine post - op       Report consisted of patients Situation, Background, Assessment and   Recommendations(SBAR). Information from the following report(s) SBAR was reviewed with the receiving nurse. Opportunity for questions and clarification was provided.

## 2022-08-10 NOTE — PERIOP NOTES
PACU IN REPORT FROM ANESTHESIA    Verbal report received from   Willem Long   [] MD/DO-Anesthesiologist    [] CRNA   [] with student    CHOICE ANESTHESIA:  [] GENERAL  [] TIVA  [x] MAC  [] LOCAL  [x] REGIONAL  [x] SPINAL   [] EPIDURAL   **Note the anesthesia record for medications given intraoperatively. **           [] E.R.A.S. PROTOCOL    SURGICAL PROCEDURE: Procedure(s) (LRB):  LEFT TOTAL KNEE REPLACEMENT (SPINAL/REGIONAL BLOCK (Left)     SURGEON: Liv Melendrez MD.    Brief Initial Visual Assessment:    Patient Age: [] Infant(1-12mo)      []Pediatric(1-13yrs)    [] Adolescent(13-18yrs)    [x] Adult(18-65yrs)      []Geriatric Adult(>65yrs). Patient    [] Alert           []Calm & Cooperative      [] Anxious  Appearance: [x] Drowsy      [x] Sedated      [] Unresponsive     Oriented x  1            Airway:     [x] Patent          [] \"Difficult Airway\" report by Anesthesia                        [] Obstructs easily/Obstructed on arrival          [] Manual airway assistance necessary                         [] Airway improved with head/airway repositioning                       Airway Adjuncts Present: [] Oral Airway    [] Nasal Trumpet    [] ETT    [] LMA            Respiratory  [x] Even   [] Labored   [] Shallow   [] Tachypnea   [] Bradypnea  Pattern:    [x] Non-Labored  [] VENT and/or respiratory assistance     being provided. Skin:     [x] Pink [] Dusky    [] Pale        [x] Warm    [] Hot [] Cool       [] Cold   [x]Dry [] Moist [] Diaphoretic     Membranes:  [x] Pink [] Pale       [x] Moist [] Dry     [] Crusty     Pain:   [x] No Acute Discomfort. 0  /10 Scale [x] Verbal Numeric   [] Moderate Discomfort.     [] V.A.S. [] Acute Discomfort.                [] A.N.V.    [] Chronic-Issue Related Discomfort.   [] F.L.A.C.C. Note E-MAR for medications administered.   []Faces, Cifuentes/Baker    Note assessments documented in flowsheets; any assessment variants to be found in comments or narrative perioperative nurse notes.        Post-anesthesia care now assumed by [facility] BSN, RN-BC

## 2022-08-10 NOTE — ANESTHESIA PREPROCEDURE EVALUATION
Relevant Problems   CARDIOVASCULAR   (+) Chronic diastolic congestive heart failure (HCC)   (+) Essential hypertension   (+) Heart murmur, systolic      RENAL FAILURE   (+) Renal cancer, left (HCC)      ENDOCRINE   (+) Controlled type 2 diabetes mellitus without complication, without long-term current use of insulin (HCC)   (+) Morbid obesity (HCC)      HEMATOLOGY   (+) Lymphadenopathy, inguinal      PERSONAL HX & FAMILY HX OF CANCER   (+) Renal cancer, left (HCC)       Anesthetic History   No history of anesthetic complications            Review of Systems / Medical History  Patient summary reviewed, nursing notes reviewed and pertinent labs reviewed    Pulmonary  Within defined limits            Pertinent negatives: No smoker     Neuro/Psych   Within defined limits           Cardiovascular    Hypertension      CHF        Exercise tolerance: >4 METS  Comments: Echo 3/21/22:   Left ventricle size is normal. Normal wall thickness. Normal wall motion. Normal left ventricular systolic function. EF by 2D [employer]ons Biplane is 63%. Normal diastolic function for age.   Mitral Valve: Mildly thickened leaflet.  Mild annular calcification of the mitral valve     GI/Hepatic/Renal  Within defined limits              Endo/Other    Diabetes: type 2    Morbid obesity, arthritis and cancer (renal call cancer)     Other Findings            Physical Exam    Airway  Mallampati: II  TM Distance: > 6 cm  Neck ROM: normal range of motion   Mouth opening: Normal     Cardiovascular  Regular rate and rhythm,  S1 and S2 normal,  no murmur, click, rub, or gallop  Rhythm: regular  Rate: normal         Dental    Dentition: Poor dentition  Comments: Several broken teeth throughout   Pulmonary  Breath sounds clear to auscultation               Abdominal  GI exam deferred       Other Findings            Anesthetic Plan    ASA: 3  Anesthesia type: regional, spinal and general - backup          Induction: Intravenous  Anesthetic plan and risks discussed with: Patient and Family

## 2022-08-11 ENCOUNTER — APPOINTMENT (OUTPATIENT)
Dept: VASCULAR SURGERY | Age: 62
End: 2022-08-11
Attending: NURSE PRACTITIONER
Payer: COMMERCIAL

## 2022-08-11 ENCOUNTER — HOME HEALTH ADMISSION (OUTPATIENT)
Dept: HOME HEALTH SERVICES | Facility: HOME HEALTH | Age: 62
End: 2022-08-11
Payer: COMMERCIAL

## 2022-08-11 LAB
ANION GAP SERPL CALC-SCNC: 5 MMOL/L (ref 5–15)
BUN SERPL-MCNC: 14 MG/DL (ref 6–20)
BUN/CREAT SERPL: 18 (ref 12–20)
CALCIUM SERPL-MCNC: 8.9 MG/DL (ref 8.5–10.1)
CHLORIDE SERPL-SCNC: 108 MMOL/L (ref 97–108)
CO2 SERPL-SCNC: 27 MMOL/L (ref 21–32)
CREAT SERPL-MCNC: 0.77 MG/DL (ref 0.55–1.02)
GLUCOSE SERPL-MCNC: 142 MG/DL (ref 65–100)
HGB BLD-MCNC: 8.7 G/DL (ref 11.5–16)
POTASSIUM SERPL-SCNC: 3.6 MMOL/L (ref 3.5–5.1)
SODIUM SERPL-SCNC: 140 MMOL/L (ref 136–145)

## 2022-08-11 PROCEDURE — 74011250636 HC RX REV CODE- 250/636: Performed by: ORTHOPAEDIC SURGERY

## 2022-08-11 PROCEDURE — 97116 GAIT TRAINING THERAPY: CPT

## 2022-08-11 PROCEDURE — 36415 COLL VENOUS BLD VENIPUNCTURE: CPT

## 2022-08-11 PROCEDURE — 74011000250 HC RX REV CODE- 250: Performed by: ORTHOPAEDIC SURGERY

## 2022-08-11 PROCEDURE — 74011250637 HC RX REV CODE- 250/637: Performed by: NURSE PRACTITIONER

## 2022-08-11 PROCEDURE — 80048 BASIC METABOLIC PNL TOTAL CA: CPT

## 2022-08-11 PROCEDURE — 85018 HEMOGLOBIN: CPT

## 2022-08-11 PROCEDURE — 74011250637 HC RX REV CODE- 250/637: Performed by: ORTHOPAEDIC SURGERY

## 2022-08-11 PROCEDURE — 97110 THERAPEUTIC EXERCISES: CPT

## 2022-08-11 PROCEDURE — 97530 THERAPEUTIC ACTIVITIES: CPT

## 2022-08-11 PROCEDURE — 93971 EXTREMITY STUDY: CPT

## 2022-08-11 RX ORDER — DEXTROMETHORPHAN HYDROBROMIDE, GUAIFENESIN 5; 100 MG/5ML; MG/5ML
650 LIQUID ORAL EVERY 8 HOURS
Qty: 60 TABLET | Refills: 1 | Status: SHIPPED | OUTPATIENT
Start: 2022-08-11 | End: 2022-09-08

## 2022-08-11 RX ORDER — HYDROMORPHONE HYDROCHLORIDE 2 MG/1
2 TABLET ORAL
Status: DISCONTINUED | OUTPATIENT
Start: 2022-08-11 | End: 2022-08-12 | Stop reason: HOSPADM

## 2022-08-11 RX ORDER — HYDROMORPHONE HYDROCHLORIDE 2 MG/1
2 TABLET ORAL
Qty: 50 TABLET | Refills: 0 | Status: SHIPPED | OUTPATIENT
Start: 2022-08-11 | End: 2022-08-18

## 2022-08-11 RX ORDER — ASPIRIN 81 MG/1
81 TABLET ORAL 2 TIMES DAILY
Qty: 60 TABLET | Refills: 0 | Status: SHIPPED | OUTPATIENT
Start: 2022-08-11 | End: 2022-09-08

## 2022-08-11 RX ORDER — HYDROMORPHONE HYDROCHLORIDE 2 MG/1
4 TABLET ORAL
Status: DISCONTINUED | OUTPATIENT
Start: 2022-08-11 | End: 2022-08-12 | Stop reason: HOSPADM

## 2022-08-11 RX ORDER — POLYETHYLENE GLYCOL 3350 17 G/17G
17 POWDER, FOR SOLUTION ORAL DAILY
Qty: 7 PACKET | Refills: 0 | Status: SHIPPED | OUTPATIENT
Start: 2022-08-11 | End: 2022-08-18

## 2022-08-11 RX ORDER — MELOXICAM 15 MG/1
15 TABLET ORAL DAILY
Qty: 30 TABLET | Refills: 0 | Status: SHIPPED | OUTPATIENT
Start: 2022-08-11 | End: 2022-09-08

## 2022-08-11 RX ADMIN — ASPIRIN 81 MG: 81 TABLET, COATED ORAL at 09:01

## 2022-08-11 RX ADMIN — METFORMIN HYDROCHLORIDE 500 MG: 500 TABLET ORAL at 09:01

## 2022-08-11 RX ADMIN — ACETAMINOPHEN 1000 MG: 500 TABLET ORAL at 00:55

## 2022-08-11 RX ADMIN — HYDROMORPHONE HYDROCHLORIDE 4 MG: 2 TABLET ORAL at 13:33

## 2022-08-11 RX ADMIN — ACETAMINOPHEN 1000 MG: 500 TABLET ORAL at 06:17

## 2022-08-11 RX ADMIN — KETOROLAC TROMETHAMINE 15 MG: 30 INJECTION, SOLUTION INTRAMUSCULAR; INTRAVENOUS at 10:57

## 2022-08-11 RX ADMIN — ASPIRIN 81 MG: 81 TABLET, COATED ORAL at 16:30

## 2022-08-11 RX ADMIN — SENNOSIDES AND DOCUSATE SODIUM 1 TABLET: 50; 8.6 TABLET ORAL at 18:01

## 2022-08-11 RX ADMIN — HYDROMORPHONE HYDROCHLORIDE 4 MG: 2 TABLET ORAL at 16:30

## 2022-08-11 RX ADMIN — METFORMIN HYDROCHLORIDE 500 MG: 500 TABLET ORAL at 16:30

## 2022-08-11 RX ADMIN — SODIUM CHLORIDE, PRESERVATIVE FREE 10 ML: 5 INJECTION INTRAVENOUS at 21:42

## 2022-08-11 RX ADMIN — FUROSEMIDE 40 MG: 40 TABLET ORAL at 09:01

## 2022-08-11 RX ADMIN — ATORVASTATIN CALCIUM 40 MG: 20 TABLET, FILM COATED ORAL at 21:42

## 2022-08-11 RX ADMIN — CEFAZOLIN 2 G: 1 INJECTION, POWDER, FOR SOLUTION INTRAMUSCULAR; INTRAVENOUS at 00:54

## 2022-08-11 RX ADMIN — KETOROLAC TROMETHAMINE 15 MG: 30 INJECTION, SOLUTION INTRAMUSCULAR; INTRAVENOUS at 06:17

## 2022-08-11 RX ADMIN — HYDROMORPHONE HYDROCHLORIDE 4 MG: 2 TABLET ORAL at 09:01

## 2022-08-11 RX ADMIN — LOSARTAN POTASSIUM 50 MG: 25 TABLET, FILM COATED ORAL at 09:01

## 2022-08-11 RX ADMIN — HYDROMORPHONE HYDROCHLORIDE 4 MG: 2 TABLET ORAL at 21:42

## 2022-08-11 RX ADMIN — SENNOSIDES AND DOCUSATE SODIUM 1 TABLET: 50; 8.6 TABLET ORAL at 09:01

## 2022-08-11 RX ADMIN — POLYETHYLENE GLYCOL 3350 17 G: 17 POWDER, FOR SOLUTION ORAL at 09:02

## 2022-08-11 NOTE — PROGRESS NOTES
Problem: Mobility Impaired (Adult and Pediatric)  Goal: *Acute Goals and Plan of Care (Insert Text)  Description: FUNCTIONAL STATUS PRIOR TO ADMISSION: Patient was independent and active without use of DME.    HOME SUPPORT PRIOR TO ADMISSION: The patient lived with her spouse, daughter and son in law but did not require assist.    Physical Therapy Goals  Initiated 8/10/2022    1. Patient will move from supine to sit and sit to supine , scoot up and down, and roll side to side in bed with independence within 4 days. 2. Patient will perform sit to stand with modified independence within 4 days. 3. Patient will ambulate with modified independence for 100 feet with the least restrictive device within 4 days. 4. Patient will ascend/descend 10 stairs with 1-2 handrail(s) with minimal assistance/contact guard assist within 4 days. 5. Patient will perform home exercise program per protocol with independence within 4 days. 6. Patient will demonstrate AROM 0-90 degrees in operative joint within 4 days. Outcome: Progressing Towards Goal   PHYSICAL THERAPY TREATMENT  Patient: Liz Avina (64 y.o. female)  Date: 8/11/2022  Diagnosis: Primary osteoarthritis of left knee [M17.12]  Arthritis of left knee [M17.12] <principal problem not specified>  Procedure(s) (LRB):  LEFT TOTAL KNEE REPLACEMENT (SPINAL/REGIONAL BLOCK (Left) 1 Day Post-Op  Precautions: WBAT, Fall  Chart, physical therapy assessment, plan of care and goals were reviewed. ASSESSMENT  Patient continues with skilled PT services and is progressing towards goals. Pt limited by knee pain and pinpoint calf pain tender to light touch. Ortho NP made aware. Pt requires increased time for all mobility-up to Min A for bed mobility and sit<>stand transfers to RW. Progressed gait distance into bathroom and then 40ft into hallway(requires >10 minutes for this task). Maintains step to pattern d/t pain but tolerates well.  C/o feeling flushed and returned to supine despite stable vitals. Do not anticipate clearance today given pain levels and slow to mobilize. .     Current Level of Function Impacting Discharge (mobility/balance): Min A for mobility    Other factors to consider for discharge: pain control         PLAN :  Patient continues to benefit from skilled intervention to address the above impairments. Continue treatment per established plan of care. to address goals. Recommendation for discharge: (in order for the patient to meet his/her long term goals)  Physical therapy at least 2 days/week in the home     This discharge recommendation:  Has been made in collaboration with the attending provider and/or case management    IF patient discharges home will need the following DME: rolling walker       SUBJECTIVE:   Patient stated I am hurting. Last night was bad.     OBJECTIVE DATA SUMMARY:   Critical Behavior:  Neurologic State: Alert, Appropriate for age  Orientation Level: Oriented X4  Cognition: Follows commands     Functional Mobility Training:  Bed Mobility:     Supine to Sit: Minimum assistance  Sit to Supine: Minimum assistance           Transfers:  Sit to Stand: Minimum assistance  Stand to Sit: Contact guard assistance                             Balance:  Sitting: Intact  Standing: Impaired  Standing - Static: Good  Standing - Dynamic : Good  Ambulation/Gait Training:  Distance (ft): 40 Feet (ft)  Assistive Device: Gait belt;Walker, rolling  Ambulation - Level of Assistance: Contact guard assistance        Gait Abnormalities: Antalgic;Decreased step clearance; Step to gait                                      Stairs: Therapeutic Exercises:   Knee HEP  Pain Ratin/10    Activity Tolerance:   Good    After treatment patient left in no apparent distress:   Supine in bed and Call bell within reach    COMMUNICATION/COLLABORATION:   The patients plan of care was discussed with: Registered nurse.      Parag Gustafson, PT   Time Calculation: 36 mins

## 2022-08-11 NOTE — PROGRESS NOTES
Orthopedic Rounding Note    Subjective:  Patient reports doing ok this morning. Pain is moderate.     Objective:  Visit Vitals  /60 (BP 1 Location: Left upper arm, BP Patient Position: At rest)   Pulse 64   Temp 98.3 °F (36.8 °C)   Resp 18   Ht 5' 2.5\" (1.588 m)   Wt 96 kg (211 lb 10.3 oz)   SpO2 98%   BMI 38.09 kg/m²       Recent Results (from the past 24 hour(s))   METABOLIC PANEL, BASIC    Collection Time: 08/11/22  2:35 AM   Result Value Ref Range    Sodium 140 136 - 145 mmol/L    Potassium 3.6 3.5 - 5.1 mmol/L    Chloride 108 97 - 108 mmol/L    CO2 27 21 - 32 mmol/L    Anion gap 5 5 - 15 mmol/L    Glucose 142 (H) 65 - 100 mg/dL    BUN 14 6 - 20 MG/DL    Creatinine 0.77 0.55 - 1.02 MG/DL    BUN/Creatinine ratio 18 12 - 20      GFR est AA >60 >60 ml/min/1.73m2    GFR est non-AA >60 >60 ml/min/1.73m2    Calcium 8.9 8.5 - 10.1 MG/DL   HEMOGLOBIN    Collection Time: 08/11/22  2:35 AM   Result Value Ref Range    HGB 8.7 (L) 11.5 - 16.0 g/dL         Exam:    NAD   Breathing well on room air  Adequate perfusion in distal extremities      LLE:  Bandage C/D/I  Swelling within expected limits  No evidence of DVT  Palpable pedal pulse distally  Sensation intact to light touch in S/S/DP/SP/T nerve distributions  Motor intact to EHL/FHL/TA/GSC nerve distributions    Assessment:  S/p L TKA    Plan:  WBAT  PTOT  Continue inpatient care for now  Pain control  Anticipate discharge this afternoon with HHPT

## 2022-08-11 NOTE — PROGRESS NOTES
Notified by physical therapy that patient having significant left calf pain when working with them. On exam, patient resting comfortably in bed. Operative leg (LLE) elevated on blue wedge. Left calf TTP. No significant edema or erythema noted, however given patient's history will get a doppler to r/o DVT. Will continue to follow. Guido Santana NP      1600: Re-evaluated patient at bedside and notified her of negative doppler report. Calf pain has subsided some. Patient reporting uncontrolled incisional pain to left knee. PRN dilaudid ordered every 4 hours as needed and patient says it is wearing off before that time. Will change frequency of PO dilaudid to q3h PRN. Communicated this to patient and her bedside RN, Tatianna Morales. Looking towards discharge tomorrow pending pain control and PT clearance.

## 2022-08-11 NOTE — PROGRESS NOTES
Medicare Outpatient Observation Notice (MOON)/ Formerly [facility] Outpatient Observation Notice ([place]) provided to patient/representative with verbal explanation of the notice. Time allotted for questions regarding the notice. Patient /representative provided a completed copy of the MOON/VOON notice. Copy placed on bedside chart.   Marisa Back CMS

## 2022-08-11 NOTE — DISCHARGE INSTRUCTIONS
Discharge Instructions after Total Knee Replacement  Maggie Ortega MD  Office phone number: [phone]; extension to leave voicemail [phone]      Activity:  You may put full weight on your operated leg unless otherwise instructed  Work on getting the knee all the way straight, starting to bend it, and lifting it straight up off the bed. Walk with a walker or two crutches for 2 weeks after surgery, then plan to go to a single point cane or single crutch for 2 weeks after that. The reason to walk with an assistive device is to avoid losing your balance and falling  Elevate your operated extremity (\"toes above nose\") throughout the day to decrease swelling and pain  Ice your operated knee multiple (3-4) times a day to decrease swelling and pain. Use a bag of ice or frozen vegetables inside a towel, placed onto the skin. This should be done for about 20-30 minutes at a time, with at least 20-30 minutes before the next icing session  Unless otherwise indicated, we will plan on starting physical therapy around 5-7 days after your surgery. You likely have been given a prescription for PT before the surgery or at the time of discharge from the hospital. If you did not, please contact our office. Make sure you are doing PT as prescribed and doing home exercises to accelerate your rehabilitation    Physical Therapy:  You should typically begin physical therapy within 5-7 days after discharge from the hospital.   For the first 5-7 days after surgery, the initial goal is to be able to get the knee all the way straight and to begin bending. We want the inflammation from the surgery to decrease initially  Physical therapy is critical to success after your operation. You should have a prescription for PT that details the goals that I have for the physical therapist after surgery. You should be attending PT 3x per week for 4-6 weeks after surgery.   Be sure to be performing home exercises to consolidate the gains you are making in PT. In particular straight leg raises with your knee fully straight will help to accelerate your rehab  Remember that equally as important as bending the knee (flexion) is straightening the knee all the way (extension), and that both should be gained  The PT office should be faxing weekly reports so that I remain informed of your progress, in case we need to intervene sooner than planned. Medicines: You have been prescribed multiple medications after your surgery. They typically will include pain medication, a blood thinner (to prevent blood clots), an antiinflammatory, and sometimes may include a stool softener, an antibiotic, and accessory pain control medications. Take the antiinflammatory as prescribed with food to avoid stomach upset. Take the blood thinning medication just as prescribed so as to prevent a blood clot. This is the most critical medication to be sure to take. Take the pain medication as needed for pain to improve pain control. The most helpful times to take the pain medication are before physical therapy, and shortly before bed. Wean this medication as able. Also be mindful that there are restrictions on how much pain medication we are able to give at the time of discharge. If you run out, it may take a few days to be able to refill, so be sure to allow time for refills to process. In particular, on nights and over the weekend, refills may be difficult to obtain. Dressing:  Typically, you have a silver-impregnated dressing placed over the wound which should remain for the first 7-10 days. There may be slight drainage on the dressing in the first week or so, but should not be large volumes, and the drainage should resolve after the first several days or so after surgery.    After the removal of the initial dressing, you will place a large clean dry gauze pad (ABD pad, which can be bought from a drug store if you were not supplied with any), secured with an ace wrap (also available from a drug store) over the incision. This should be replaced daily or every other day, depending on how frequently you shower. Your wound is usually closed with absorbable stitches and glue with tape on the skin. Leave the glue and the tape on the skin until you follow up at 2 weeks after surgery. At that point, it will likely be removed. Call our office with questions about the dressing or if there are any concerns    Home Care:  Do not sleep with a pillow under the knee. This is to make sure your knee is able to get full extension (all the way straight), which is especially important in the first few weeks after surgery. You may shower after your surgery, but do not submerge the wound in water (pool, hot tub, bath, etc.)  After a shower, pat the incision dry with a towel, but do not scrape or rub dry  Be sure to ice and elevate the leg frequently  Try to be and active, moving around, but take time to elevate the leg to decrease swelling. Don't walk for excessively long distances, as this may increase swelling, stiffness, and pain. Follow Up:  Plan to be seen in the office at the 2 week kristina, then at the 6 week kristina. You may see my physician assistant at the 2 week visit. Your follow up may already be set up by the time you have the surgery, or may be set up shortly after you leave the hospital.     When to Contact Our Office :  When possible, if you have questions or concerns after your surgery, it is preferable to deal with these through our office (instead of an urgent care or emergency department), as we are more familiar with your care and treatment plan. Call our office if:      You have questions about your medications  You have concerns about your wound or dressing  You need to change an appointment  You have questions or concerns about your physical therapy  You mikel a refill on your medications. Please remember to leave adequate time to allow for a refill, particularly for pain medications.  Nights and weekends in particular are difficult to obtain a refill. You have a fever >101.5  You are having pain that is poorly controlled  You are having nausea, vomiting, or other symptoms that are minor   You are having a reaction to your medications (aside from facial swelling or trouble breathing, which is an emergency)    **Our office number is [phone]; the extension to leave a voicemail for our team is [phone]. If you are unable to speak with someone, please leave a message. **    When to Go to the Emergency Department or Seek Emergency Care:   You have chest pain  You are having difficulty breathing  You are having difficulty swallowing, or feel your face or throat are swelling  You have weakness on one side of your body, have difficulty seeing, facial droop, or other signs of a stroke

## 2022-08-11 NOTE — PROGRESS NOTES
Problem: Mobility Impaired (Adult and Pediatric)  Goal: *Acute Goals and Plan of Care (Insert Text)  Description: FUNCTIONAL STATUS PRIOR TO ADMISSION: Patient was independent and active without use of DME.    HOME SUPPORT PRIOR TO ADMISSION: The patient lived with her spouse, daughter and son in law but did not require assist.    Physical Therapy Goals  Initiated 8/10/2022    1. Patient will move from supine to sit and sit to supine , scoot up and down, and roll side to side in bed with independence within 4 days. 2. Patient will perform sit to stand with modified independence within 4 days. 3. Patient will ambulate with modified independence for 100 feet with the least restrictive device within 4 days. 4. Patient will ascend/descend 10 stairs with 1-2 handrail(s) with minimal assistance/contact guard assist within 4 days. 5. Patient will perform home exercise program per protocol with independence within 4 days. 6. Patient will demonstrate AROM 0-90 degrees in operative joint within 4 days. 8/11/2022 1643 by Lavell Singh PT  Outcome: Progressing Towards Goals  PHYSICAL THERAPY TREATMENT  Patient: Jesus Costello (23 y.o. female)  Date: 8/11/2022  Diagnosis: Primary osteoarthritis of left knee [M17.12]  Arthritis of left knee [M17.12] <principal problem not specified>  Procedure(s) (LRB):  LEFT TOTAL KNEE REPLACEMENT (SPINAL/REGIONAL BLOCK (Left) 1 Day Post-Op  Precautions: WBAT, Fall  Chart, physical therapy assessment, plan of care and goals were reviewed. ASSESSMENT  Patient continues with skilled PT services and is progressing towards goals. Pt received supine in bed reporting increased pain levels but agreeable to working with therapy. Requires increased time for bed mobility and Min A to advance surgical leg off of bed 2/2 pain and stiffness. Stands with CGA from EOB and Min A from commode. Verbal cueing for safe hand placement to avoid pulling up with both hands on RW.  Washes up in the bathroom with assistance from daughter and up to [address] A to maintain balance without UE support. Pt then progresses gait distance x80ft with slow gait speed, antalgic gait and decreased knee flexion during swing phase. With verbal cueing and increased time pt able to complete 25ft with reciprocal step pattern. Returned to supine per pt preference. Should be ready for stair training in the AM given progress this afternoon. Current Level of Function Impacting Discharge (mobility/balance): Min A bed mobility    Other factors to consider for discharge: pain control         PLAN :  Patient continues to benefit from skilled intervention to address the above impairments. Continue treatment per established plan of care. to address goals. Recommendation for discharge: (in order for the patient to meet his/her long term goals)  Physical therapy at least 2 days/week in the home     This discharge recommendation:  Has been made in collaboration with the attending provider and/or case management    IF patient discharges home will need the following DME: RW       SUBJECTIVE:   Patient stated I need to look like I'm doing good and show off.     OBJECTIVE DATA SUMMARY:   Critical Behavior:  Neurologic State: Alert, Appropriate for age  Orientation Level: Oriented X4  Cognition: Follows commands       Range of Motion:                            Functional Mobility Training:  Bed Mobility:     Supine to Sit: Minimum assistance  Sit to Supine: Minimum assistance           Transfers:  Sit to Stand: Contact guard assistance  Stand to Sit: Contact guard assistance                             Balance:  Sitting: Intact  Standing: Impaired  Standing - Static: Good  Standing - Dynamic : Good  Ambulation/Gait Training:  Distance (ft): 80 Feet (ft)  Assistive Device: Gait belt;Walker, rolling  Ambulation - Level of Assistance: Stand-by assistance        Gait Abnormalities: Antalgic;Decreased step clearance Activity Tolerance:   Good    After treatment patient left in no apparent distress:   Supine in bed and Call bell within reach    COMMUNICATION/COLLABORATION:   The patients plan of care was discussed with: Registered nurse.      Mariya Ivey, PT   Time Calculation: 46 mins

## 2022-08-11 NOTE — PROGRESS NOTES
8/11/2022    10:36 AM  CM notified [id number] [facility] is able to accept. 10:34 AM  CM received acceptance from [facility] for RW. RW delivered, and invoice attached in AllScripts. 9:36 AM  Care Management Assessment      Reason for Admission: Elective - Surgery required      ICD-10-CM ICD-9-CM    1. Arthritis of left knee  M17.12 716.96 HYDROmorphone (Dilaudid) 2 mg tablet          Assessment:   [x]In person with pt   []Via p/c with pt   []With family member in person. Who/Relation:     []With family member via p/c. Who/Relation:   []Chart Review    RUR: NA - OBS  Risk Level: [x]Low []Moderate []High  Value-based purchasing: [] Yes [x] No    Advance Directive: Full Code. [x] No AD on file. [] AD on file. [] Current AD not on file. Copy requested. [] Requests AD, and referral submitted to [facility]. Healthcare Decision Maker: FLAQUITO Reveles        Assessment:    Age: 57 y.o. Sex: [] Male [x]Female     Residency: [x]Private residence []Apartment []Assisted Living []LTC []Other:   Exterior Steps: 4  Interior Steps: 1 flight    Lives With: [x]With spouse (Has dementia) [x]Other family members (DTR and ANNALISA).  is able to assist some as needed. []Underage children []Alone []Care provider []Other:    Prior functioning:  [x]Independent with ADLs and iADLS []Dependent with ADLs and iADLs []Partial dependence, Specify:     Cognition: [x]Intact []Some spheres some of the time. []Some spheres all of the time. []All spheres all of the time.      Prior transportation method: [x]Self []Spouse []Other family members []Medicaid transport []Other:     Prior DME required:  []None []RW [x]Cane []Crutches [x]Bedside commode []CPAP []Home O2 (Liter/Provider: ) []Nebulizer   []Shower Chair []Wheelchair []Hospital Bed []Wendy []Stair lift []Rollator []Other:    DME available: []None []RW []Cane []Crutches []Bedside commode []CPAP []Home O2 (Liter/Provider: ) []Nebulizer   []Shower Chair []Wheelchair []Hospital Bed []Wendy []Stair lift []Rollator [x]Other: CM consult for RW noted. CM completed referral via [facility] to [facility], and is awaiting response. Rehab history: []None [x]Outpatient PT []Home Health (Provider/Date: ) []SNF (Provider/Date: ) []IPR (Provider/Date: ) []LTC (Provider/Date: ) []Hospice (Provider/Date: )  []Other:     Covid vaccination status:   [] Yes, Type:  [] Booster 1 [] Booster 2  [] No  [x] N/A / Not asked    Insurer:   Insurance Information                  OPTIM[employer] OPTIM[employer] PPO Phone: --    Subscriber: Candance Less Subscriber#: [id number]    Group#: [id number] Precert#: --            PCP: Salvador Xiao   Address: [address] [place] [address] / [address] [place] [address] Road [facility]   Phone number: [phone]   Current patient: [x]Yes []No   Approximate date of last visit: 08/05/2022   Access to virtual PCP visits: []Yes [x]No     Financial concerns/barriers: []Yes, explain: [x]No []Unknown/Not discussed    Pharmacy: [address] Transport: Family     Discharge Concerns: []Yes [x]No []Unknown   Describe:    Comments:           Transition of care plan:    []Unable to determine at this time. Awaiting clinical progress, and disposition recommendations. [] Home with family assistance as needed, and outpatient follow-up. [] Home with Outpatient PT and outpatient follow-up   Pt aware of OP appt? []Yes, Provider:   []Not scheduled   Transport provider:     [x] Home with Home Health   - Provider: Cm consult for [facility] noted. Pt requests [facility] as she will not have reliable transportation to OP PT. Pt indicated [facility] as preference. CM completed referral via [address], and is awaiting response.      []SNF/IPR   -[]Preferences given:   []Listing provided and preferences requested   -Status: []Pending []Accepted:    -Auth required: []Yes []No    -Auth initiated date:   -3 midnight stay required: []Yes []No  Date satisfied:     [] LTC:     [] Home with Hospice   -Provider:     [] [facility] information provided. [] Other:     Teznaila Bojorquez MA    Care Management Interventions  PCP Verified by CM: Yes  Mode of Transport at Discharge:  Other (see comment)  Transition of Care Consult (CM Consult): Discharge Planning  MyChart Signup: No  Discharge Durable Medical Equipment: Yes  Physical Therapy Consult: Yes  Occupational Therapy Consult: Yes  Speech Therapy Consult: No  Support Systems: Spouse/Significant Other, Child(rema)  Confirm Follow Up Transport: Family  Discharge Location  Patient Expects to be Discharged to[de-identified] Home with home health

## 2022-08-11 NOTE — PROGRESS NOTES
Spiritual Care Assessment/Progress Note  [address]      NAME: Luz Marina Jenkins      MRN: [id number]  AGE: 57 y.o.  SEX: female  Baptist Affiliation: Rastafarian of god   Language: English     8/11/2022     Total Time (in minutes): 25     Spiritual Assessment begun in [facility] 4M POST SURG ORT 1 through conversation with:         [x]Patient        [] Family    [] Friend(s)        Reason for Consult: Request by staff     Spiritual beliefs: (Please include comment if needed)     [x] Identifies with a alhaji tradition: [place]       [x] Supported by a alhaji community: [address] [place] [place] [address]           [] Claims no spiritual orientation:           [] Seeking spiritual identity:                [] Adheres to an individual form of spirituality:           [] Not able to assess:                           Identified resources for coping:      [x] Prayer                               [] Music                  [] Guided Imagery     [x] Family/friends                 [] Pet visits     [] Devotional reading                         [] Unknown     [] Other:                                               Interventions offered during this visit: (See comments for more details)    Patient Interventions: Affirmation of emotions/emotional suffering, Affirmation of alhaji, Catharsis/review of pertinent events in supportive environment, Coping skills reviewed/reinforced, Iconic (affirming the presence of God/Higher Power), Normalization of emotional/spiritual concerns, Prayer (actual)           Plan of Care:     [] Support spiritual and/or cultural needs    [] Support AMD and/or advance care planning process      [] Support grieving process   [] Coordinate Rites and/or Rituals    [] Coordination with community clergy   [] No spiritual needs identified at this time   [] Detailed Plan of Care below (See Comments)  [] Make referral to Music Therapy  [] Make referral to Pet Therapy     [] Make referral to Addiction services  [] Make referral to [facility]  [] Make referral to Spiritual Care Partner  [] No future visits requested        [x] Follow up visits as needed     Visited patient for initial spiritual assessment. She spoke of her surgery and hopes of full recovery. She lives with her  in a home with her daughter and son-in-law and expects to be well supported. A major concern she has is her  who, she reports, is in early stages of dementia. She is a person of [place] alhaji and derives a sense of support and hope from her alhaji.   Chaplain Lauren, MDiv, MS, [facility]

## 2022-08-11 NOTE — FACE TO FACE
Leader rounding and   Rounded on patient  Patient alert and oriented  Follow up from pre-op Joint Patient Education Class. Patient states class information was valuable in preparing for surgery. Patient states their home space is prepared and safe for recovery. Reviewed ice application, leg positioning, exercises and incentive spirometry use. IS provided to patient from belonging bag   Opportunity provided for patient to ask questions and provide comments. Questions answered.    Ice packs replaced

## 2022-08-12 VITALS
TEMPERATURE: 98.2 F | DIASTOLIC BLOOD PRESSURE: 59 MMHG | BODY MASS INDEX: 37.5 KG/M2 | WEIGHT: 211.64 LBS | OXYGEN SATURATION: 96 % | SYSTOLIC BLOOD PRESSURE: 106 MMHG | HEART RATE: 69 BPM | HEIGHT: 63 IN | RESPIRATION RATE: 18 BRPM

## 2022-08-12 PROCEDURE — 97530 THERAPEUTIC ACTIVITIES: CPT

## 2022-08-12 PROCEDURE — 74011250637 HC RX REV CODE- 250/637: Performed by: NURSE PRACTITIONER

## 2022-08-12 PROCEDURE — 97116 GAIT TRAINING THERAPY: CPT

## 2022-08-12 PROCEDURE — 74011250637 HC RX REV CODE- 250/637: Performed by: ORTHOPAEDIC SURGERY

## 2022-08-12 PROCEDURE — 74011000250 HC RX REV CODE- 250: Performed by: ORTHOPAEDIC SURGERY

## 2022-08-12 RX ADMIN — METFORMIN HYDROCHLORIDE 500 MG: 500 TABLET ORAL at 08:00

## 2022-08-12 RX ADMIN — HYDROMORPHONE HYDROCHLORIDE 4 MG: 2 TABLET ORAL at 10:00

## 2022-08-12 RX ADMIN — HYDROMORPHONE HYDROCHLORIDE 4 MG: 2 TABLET ORAL at 13:50

## 2022-08-12 RX ADMIN — ASPIRIN 81 MG: 81 TABLET, COATED ORAL at 08:00

## 2022-08-12 RX ADMIN — POLYETHYLENE GLYCOL 3350 17 G: 17 POWDER, FOR SOLUTION ORAL at 08:00

## 2022-08-12 RX ADMIN — HYDROMORPHONE HYDROCHLORIDE 4 MG: 2 TABLET ORAL at 06:26

## 2022-08-12 RX ADMIN — SENNOSIDES AND DOCUSATE SODIUM 1 TABLET: 50; 8.6 TABLET ORAL at 08:00

## 2022-08-12 RX ADMIN — ACETAMINOPHEN 1000 MG: 500 TABLET ORAL at 01:03

## 2022-08-12 RX ADMIN — SODIUM CHLORIDE, PRESERVATIVE FREE 10 ML: 5 INJECTION INTRAVENOUS at 06:26

## 2022-08-12 RX ADMIN — HYDROMORPHONE HYDROCHLORIDE 4 MG: 2 TABLET ORAL at 01:03

## 2022-08-12 RX ADMIN — ACETAMINOPHEN 1000 MG: 500 TABLET ORAL at 06:25

## 2022-08-12 RX ADMIN — ACETAMINOPHEN 1000 MG: 500 TABLET ORAL at 13:50

## 2022-08-12 NOTE — PROGRESS NOTES
8/12/2022  1:28 PM  Care Management Progress Note      ICD-10-CM ICD-9-CM    1. Arthritis of left knee  M17.12 716.96 HYDROmorphone (Dilaudid) 2 mg tablet          RUR:  NA - OBS  Risk Level: [x]Low []Moderate []High  Value-based purchasing: [] Yes [x] No  Bundle patient: [] Yes [x] No   Specify:     Transition of care plan:  Discharge order submitted. Pt has medically cleared. Home with [facility] with [facility], and they were notified of pt's discharge. Outpatient follow-up. Pt's family to transport. Care Management Interventions  PCP Verified by CM: Yes  Mode of Transport at Discharge:  Other (see comment)  Transition of Care Consult (CM Consult): Discharge Planning  MyChart Signup: No  Discharge Durable Medical Equipment: Yes  Physical Therapy Consult: Yes  Occupational Therapy Consult: Yes  Speech Therapy Consult: No  Support Systems: Spouse/Significant Other, Child(rema)  Confirm Follow Up Transport: Family  Discharge Location  Patient Expects to be Discharged to[de-identified] Home with home health

## 2022-08-12 NOTE — PROGRESS NOTES
Problem: Falls - Risk of  Goal: *Absence of Falls  Description: Document Francis Barillas Fall Risk and appropriate interventions in the flowsheet.   Outcome: Progressing Towards Goal  Note: Fall Risk Interventions:  Mobility Interventions: Bed/chair exit alarm, PT Consult for mobility concerns, PT Consult for assist device competence         Medication Interventions: Teach patient to arise slowly, Patient to call before getting OOB    Elimination Interventions: Bed/chair exit alarm, Call light in reach, Patient to call for help with toileting needs    History of Falls Interventions: Bed/chair exit alarm         Problem: Patient Education: Go to Patient Education Activity  Goal: Patient/Family Education  Outcome: Progressing Towards Goal

## 2022-08-12 NOTE — PROGRESS NOTES
I have reviewed discharge instructions with the patient. The patient verbalized understanding. Discharge medications reviewed with patient and appropriate educational materials and side effects teaching were provided. Current Discharge Medication List        START taking these medications    Details   acetaminophen (Tylenol Arthritis Pain) 650 mg TbER Take 1 Tablet by mouth every eight (8) hours. Qty: 60 Tablet, Refills: 1  Start date: 8/11/2022      HYDROmorphone (Dilaudid) 2 mg tablet Take 1 Tablet by mouth every four (4) hours as needed for Pain for up to 7 days. Max Daily Amount: 12 mg.  Qty: 50 Tablet, Refills: 0  Start date: 8/11/2022, End date: 8/18/2022    Associated Diagnoses: Arthritis of left knee      aspirin delayed-release 81 mg tablet Take 1 Tablet by mouth two (2) times a day. Qty: 60 Tablet, Refills: 0  Start date: 8/11/2022      polyethylene glycol (MIRALAX) 17 gram packet Take 1 Packet by mouth in the morning for 7 days. Qty: 7 Packet, Refills: 0  Start date: 8/11/2022, End date: 8/18/2022      meloxicam (Mobic) 15 mg tablet Take 1 Tablet by mouth in the morning for 30 days. Qty: 30 Tablet, Refills: 0  Start date: 8/11/2022, End date: 9/10/2022           CONTINUE these medications which have NOT CHANGED    Details   furosemide (LASIX) 40 mg tablet Take 40 mg by mouth Every morning. losartan (COZAAR) 50 mg tablet Take 50 mg by mouth Every morning. metFORMIN (GLUCOPHAGE) 500 mg tablet TAKE 2 TABLETS BY MOUTH TWICE A DAY WITH MEALS  Qty: 120 Tablet, Refills: 2      TURMERIC, BULK, Take 1 Dose by mouth Every morning. atorvastatin (LIPITOR) 40 mg tablet TK 1 T PO QD HS      ginger, Zingiber officinalis, (ginger extract) 250 mg cap Take 1 Capsule by mouth Every morning. Apple Cider Vinegar 300 mg tab Take 1 Tablet by mouth Every morning.            STOP taking these medications       mupirocin (BACTROBAN) 2 % ointment Comments:   Reason for Stopping: acetaminophen (TYLENOL) 500 mg tablet Comments:   Reason for Stopping:

## 2022-08-12 NOTE — PROGRESS NOTES
Orthopedic Rounding Note    Subjective:  Patient reports doing ok this morning. DVT study from yesterday negative. Objective:  Visit Vitals  BP (!) 106/59 (BP 1 Location: Left upper arm, BP Patient Position: At rest)   Pulse 69   Temp 98.2 °F (36.8 °C)   Resp 18   Ht 5' 2.5\" (1.588 m)   Wt 96 kg (211 lb 10.3 oz)   SpO2 96%   BMI 38.09 kg/m²       No results found for this or any previous visit (from the past 24 hour(s)).         Exam:    NAD   Breathing well on room air  Adequate perfusion in distal extremities      LLE:  Bandage C/D/I  Swelling within expected limits  No evidence of DVT  Palpable pedal pulse distally  Sensation intact to light touch in S/S/DP/SP/T nerve distributions  Motor intact to EHL/FHL/TA/GSC nerve distribution    Assessment:  S/p L TKA POD#1    Plan:  WBAT  PTOT  Continue inpatient care for now  Pain control  Anticipate discharge today with HHPT

## 2022-08-12 NOTE — PROGRESS NOTES
Problem: Mobility Impaired (Adult and Pediatric)  Goal: *Acute Goals and Plan of Care (Insert Text)  Description: FUNCTIONAL STATUS PRIOR TO ADMISSION: Patient was independent and active without use of DME.    HOME SUPPORT PRIOR TO ADMISSION: The patient lived with her spouse, daughter and son in law but did not require assist.    Physical Therapy Goals  Initiated 8/10/2022    1. Patient will move from supine to sit and sit to supine , scoot up and down, and roll side to side in bed with independence within 4 days. 2. Patient will perform sit to stand with modified independence within 4 days. 3. Patient will ambulate with modified independence for 100 feet with the least restrictive device within 4 days. 4. Patient will ascend/descend 10 stairs with 1-2 handrail(s) with minimal assistance/contact guard assist within 4 days. 5. Patient will perform home exercise program per protocol with independence within 4 days. 6. Patient will demonstrate AROM 0-90 degrees in operative joint within 4 days. Outcome: Progressing Towards Goal   PHYSICAL THERAPY TREATMENT  Patient: Jesus Costello (78 y.o. female)  Date: 8/12/2022  Diagnosis: Primary osteoarthritis of left knee [M17.12]  Arthritis of left knee [M17.12] <principal problem not specified>  Procedure(s) (LRB):  LEFT TOTAL KNEE REPLACEMENT (SPINAL/REGIONAL BLOCK (Left) 2 Days Post-Op  Precautions: WBAT, Fall  Chart, physical therapy assessment, plan of care and goals were reviewed. ASSESSMENT  Patient continues with skilled PT services and is progressing towards goals. Much improved pain control today. Pt performs transfers with increased time and SBA. Progressed gait distance to 140ft and negotiates 7 steps with single railing and SPC. Attempted both lateral and forward approach for pt's comfort. Pt reports she will have physical assistance from at least two family members when climbing the steps at home.  Agreeable to sit up in the chair at end of session. Pt will benefit from HHPT at discharge to maintain ROM and ensure safety in the home environment. Current Level of Function Impacting Discharge (mobility/balance): SBA for mobility    Other factors to consider for discharge: cleared         PLAN :  Patient continues to benefit from skilled intervention to address the above impairments. Continue treatment per established plan of care. to address goals. Recommendation for discharge: (in order for the patient to meet his/her long term goals)  Physical therapy at least 2 days/week in the home     This discharge recommendation:  Has been made in collaboration with the attending provider and/or case management    IF patient discharges home will need the following DME: patient owns DME required for discharge       SUBJECTIVE:   Patient stated I am doing much better.     OBJECTIVE DATA SUMMARY:   Critical Behavior:  Neurologic State: Alert  Orientation Level: Oriented X4  Cognition: Follows commands       Range of Motion:                            Functional Mobility Training:  Bed Mobility:     Supine to Sit: Supervision  Sit to Supine: Supervision           Transfers:  Sit to Stand: Stand-by assistance  Stand to Sit: Stand-by assistance                             Balance:  Sitting: Intact  Standing: Intact  Standing - Static: Good  Standing - Dynamic : Good  Ambulation/Gait Training:  Distance (ft): 140 Feet (ft)  Assistive Device: Gait belt;Walker, rolling  Ambulation - Level of Assistance: Stand-by assistance        Gait Abnormalities: Antalgic;Decreased step clearance                                      Stairs:  Number of Stairs Trained: 7  Stairs - Level of Assistance: Stand-by assistance   Rail Use: Left         Activity Tolerance:   Good and requires rest breaks    After treatment patient left in no apparent distress:   Sitting in chair and Call bell within reach    COMMUNICATION/COLLABORATION:   The patients plan of care was discussed with: Registered nurse.      Eve Jaramillo, PT   Time Calculation: 49 mins

## 2022-08-13 ENCOUNTER — HOME CARE VISIT (OUTPATIENT)
Dept: SCHEDULING | Facility: HOME HEALTH | Age: 62
End: 2022-08-13
Payer: COMMERCIAL

## 2022-08-13 VITALS
SYSTOLIC BLOOD PRESSURE: 127 MMHG | OXYGEN SATURATION: 95 % | DIASTOLIC BLOOD PRESSURE: 70 MMHG | HEART RATE: 60 BPM | RESPIRATION RATE: 17 BRPM | TEMPERATURE: 97.8 F

## 2022-08-13 PROCEDURE — 400013 HH SOC

## 2022-08-13 PROCEDURE — G0151 HHCP-SERV OF PT,EA 15 MIN: HCPCS

## 2022-08-15 ENCOUNTER — HOME CARE VISIT (OUTPATIENT)
Dept: SCHEDULING | Facility: HOME HEALTH | Age: 62
End: 2022-08-15
Payer: COMMERCIAL

## 2022-08-15 ENCOUNTER — TELEPHONE (OUTPATIENT)
Dept: SURGERY | Age: 62
End: 2022-08-15

## 2022-08-15 VITALS
HEART RATE: 60 BPM | OXYGEN SATURATION: 98 % | RESPIRATION RATE: 16 BRPM | SYSTOLIC BLOOD PRESSURE: 138 MMHG | TEMPERATURE: 99.3 F | DIASTOLIC BLOOD PRESSURE: 80 MMHG

## 2022-08-15 PROCEDURE — G0157 HHC PT ASSISTANT EA 15: HCPCS

## 2022-08-15 NOTE — TELEPHONE ENCOUNTER
Post Discharge Phone placed to patient after Joint Replacement surgery   Spoke with patient   Pain medications patient is taking dilaudid and tylenol  States pain is tolerable and pain medication is effective.    Patient was able to fill prescriptions, no medication questions    States discharge instructions were clear and easy to understand has no questions  Patient is using a walker without difficulty  Walking hourly while awake  Able to do most exercises regularly  Bruising is minimal   Swelling is moderate  Patient is elevating leg when resting  Using ice with good relief   States dressing is intact without drainage  Denies N/V, appetite is fair, increasing today  Constipation is mild, +flatus, taking miralax, mild \"bloating\", will increase water intake  No difficulty urinating  Follow up appointment is scheduled with surgeon   PT is scheduled at home  Denies fever  Normal morning cough is unchanged  Denies calf pain, chest pain, back pain, difficulty taking a deep breath or SOB  Has some left hip, when she lays back in bed, getting better today  Discussed calling surgeon or PCP for concerns  Reminded patient to fill out survey  Opportunity given for patient to ask questions  Call duration 11:48 minutes

## 2022-08-17 ENCOUNTER — HOME CARE VISIT (OUTPATIENT)
Dept: SCHEDULING | Facility: HOME HEALTH | Age: 62
End: 2022-08-17
Payer: COMMERCIAL

## 2022-08-17 VITALS
DIASTOLIC BLOOD PRESSURE: 80 MMHG | RESPIRATION RATE: 16 BRPM | SYSTOLIC BLOOD PRESSURE: 140 MMHG | TEMPERATURE: 98.6 F | HEART RATE: 64 BPM | OXYGEN SATURATION: 99 %

## 2022-08-17 PROCEDURE — G0157 HHC PT ASSISTANT EA 15: HCPCS

## 2022-08-19 ENCOUNTER — HOME CARE VISIT (OUTPATIENT)
Dept: SCHEDULING | Facility: HOME HEALTH | Age: 62
End: 2022-08-19
Payer: COMMERCIAL

## 2022-08-19 PROCEDURE — G0157 HHC PT ASSISTANT EA 15: HCPCS

## 2022-08-21 VITALS
SYSTOLIC BLOOD PRESSURE: 122 MMHG | TEMPERATURE: 99.1 F | DIASTOLIC BLOOD PRESSURE: 70 MMHG | HEART RATE: 73 BPM | RESPIRATION RATE: 16 BRPM | OXYGEN SATURATION: 99 %

## 2022-08-22 ENCOUNTER — HOME CARE VISIT (OUTPATIENT)
Dept: SCHEDULING | Facility: HOME HEALTH | Age: 62
End: 2022-08-22
Payer: COMMERCIAL

## 2022-08-22 VITALS
HEART RATE: 64 BPM | OXYGEN SATURATION: 99 % | RESPIRATION RATE: 14 BRPM | TEMPERATURE: 97.4 F | DIASTOLIC BLOOD PRESSURE: 82 MMHG | SYSTOLIC BLOOD PRESSURE: 138 MMHG

## 2022-08-22 PROCEDURE — G0151 HHCP-SERV OF PT,EA 15 MIN: HCPCS

## 2022-08-24 ENCOUNTER — HOME CARE VISIT (OUTPATIENT)
Dept: SCHEDULING | Facility: HOME HEALTH | Age: 62
End: 2022-08-24
Payer: COMMERCIAL

## 2022-08-24 VITALS
OXYGEN SATURATION: 99 % | HEART RATE: 62 BPM | TEMPERATURE: 97.5 F | SYSTOLIC BLOOD PRESSURE: 132 MMHG | DIASTOLIC BLOOD PRESSURE: 80 MMHG | RESPIRATION RATE: 12 BRPM

## 2022-08-24 PROCEDURE — G0151 HHCP-SERV OF PT,EA 15 MIN: HCPCS

## 2022-08-26 ENCOUNTER — HOME CARE VISIT (OUTPATIENT)
Dept: SCHEDULING | Facility: HOME HEALTH | Age: 62
End: 2022-08-26
Payer: COMMERCIAL

## 2022-08-26 VITALS
DIASTOLIC BLOOD PRESSURE: 72 MMHG | RESPIRATION RATE: 12 BRPM | HEART RATE: 60 BPM | TEMPERATURE: 97.7 F | SYSTOLIC BLOOD PRESSURE: 138 MMHG | OXYGEN SATURATION: 99 %

## 2022-08-26 PROCEDURE — G0151 HHCP-SERV OF PT,EA 15 MIN: HCPCS

## 2022-08-29 ENCOUNTER — HOME CARE VISIT (OUTPATIENT)
Dept: SCHEDULING | Facility: HOME HEALTH | Age: 62
End: 2022-08-29
Payer: COMMERCIAL

## 2022-08-29 PROCEDURE — G0157 HHC PT ASSISTANT EA 15: HCPCS

## 2022-08-29 NOTE — Clinical Note
Patient scheduled for thyroid biopsy on 9/14/22:   THYROID BIOPSY    2:30 PM   (30 min.)   Shelley Mckeon MD   [facility]. Patient to call Dr. Abilio Baltazar to notify of procedure for approval following TKA.

## 2022-08-30 VITALS
OXYGEN SATURATION: 99 % | DIASTOLIC BLOOD PRESSURE: 80 MMHG | RESPIRATION RATE: 17 BRPM | TEMPERATURE: 99 F | HEART RATE: 61 BPM | SYSTOLIC BLOOD PRESSURE: 140 MMHG

## 2022-08-31 ENCOUNTER — HOME CARE VISIT (OUTPATIENT)
Dept: SCHEDULING | Facility: HOME HEALTH | Age: 62
End: 2022-08-31
Payer: COMMERCIAL

## 2022-08-31 VITALS
HEART RATE: 66 BPM | DIASTOLIC BLOOD PRESSURE: 72 MMHG | SYSTOLIC BLOOD PRESSURE: 140 MMHG | RESPIRATION RATE: 17 BRPM | TEMPERATURE: 97.6 F | OXYGEN SATURATION: 99 %

## 2022-08-31 PROCEDURE — G0157 HHC PT ASSISTANT EA 15: HCPCS

## 2022-08-31 NOTE — Clinical Note
Patient reports taking   Calcium carbonate 750mg/ antacid  simethicone 80 mg/ antigas  Take 1-2 chews every 2-4 hours as needed. Chew and swallow. Do not exceed 5 chews in 24 hours. Patient took last night and this am.     Patient reports pain to burning in top of stomach to mid breasts which she reports she has had off and on for two weeks which increases with water and when she lays flat. Patient had related to pain meds but has stopped opiods and pain continues. Belching relieves pain. Regular BMs reported.

## 2022-09-01 ENCOUNTER — TELEPHONE (OUTPATIENT)
Dept: FAMILY MEDICINE CLINIC | Age: 62
End: 2022-09-01

## 2022-09-01 NOTE — TELEPHONE ENCOUNTER
Called and spoke with pt. Pt id x 2 verified. Informed her per Mike Levy PA-C: \"OK for patient to stay off those meds and follow up at scheduled visit. \"      She verbalized understanding.

## 2022-09-01 NOTE — TELEPHONE ENCOUNTER
Called and spoke with pt. Pt id x 2 verified. Informed her that I was calling to check on her to see how she was doing. She verbalized understanding. She states she was in the hospital 3 weeks ago and Dr. Kaylie Rose had prescribed her aspirin and Mobic to take daily. She had been experiencing a sharp pain to burning sensation in the top of her stomach to right in between her breasts every since she started the medications. She recently stopped taking the medications on Monday. She's currently feeling much better and hasn't had any severe pain like before, she has been having the usual gas pains. Has been taking Antacids with relief. She has been experiencing slight choking whenever she drinks water and she always feels like she has to burp. She doesn't know why that is occurring. Informed her will inform the provider and will call her back when the provider responds. She verbalized understanding. Minesh Carlisle PA-C filed at 09/01/22 [id number]    Status: Signed   Can you call patient to check in and triage if sx can wait to address at appt 9/8/2022 or if she needs sooner eval (urgent care)?

## 2022-09-01 NOTE — CASE COMMUNICATION
Can you call patient to check in and triage if sx can wait to address at appt 9/8/2022 or if she needs sooner eval (urgent care)?

## 2022-09-02 ENCOUNTER — HOME CARE VISIT (OUTPATIENT)
Dept: SCHEDULING | Facility: HOME HEALTH | Age: 62
End: 2022-09-02
Payer: COMMERCIAL

## 2022-09-02 PROCEDURE — G0151 HHCP-SERV OF PT,EA 15 MIN: HCPCS

## 2022-09-04 VITALS
TEMPERATURE: 98.5 F | DIASTOLIC BLOOD PRESSURE: 82 MMHG | HEART RATE: 80 BPM | OXYGEN SATURATION: 99 % | SYSTOLIC BLOOD PRESSURE: 122 MMHG

## 2022-09-08 ENCOUNTER — OFFICE VISIT (OUTPATIENT)
Dept: FAMILY MEDICINE CLINIC | Age: 62
End: 2022-09-08
Payer: COMMERCIAL

## 2022-09-08 VITALS
WEIGHT: 201.3 LBS | TEMPERATURE: 98.1 F | SYSTOLIC BLOOD PRESSURE: 123 MMHG | DIASTOLIC BLOOD PRESSURE: 68 MMHG | BODY MASS INDEX: 35.67 KG/M2 | HEART RATE: 70 BPM | OXYGEN SATURATION: 97 % | RESPIRATION RATE: 16 BRPM | HEIGHT: 63 IN

## 2022-09-08 DIAGNOSIS — Z96.652 STATUS POST LEFT KNEE REPLACEMENT: ICD-10-CM

## 2022-09-08 DIAGNOSIS — E78.2 MIXED HYPERLIPIDEMIA: ICD-10-CM

## 2022-09-08 DIAGNOSIS — I10 ESSENTIAL HYPERTENSION: ICD-10-CM

## 2022-09-08 DIAGNOSIS — E11.9 CONTROLLED TYPE 2 DIABETES MELLITUS WITHOUT COMPLICATION, WITHOUT LONG-TERM CURRENT USE OF INSULIN (HCC): Primary | ICD-10-CM

## 2022-09-08 PROCEDURE — 99214 OFFICE O/P EST MOD 30 MIN: CPT | Performed by: FAMILY MEDICINE

## 2022-09-08 PROCEDURE — 3044F HG A1C LEVEL LT 7.0%: CPT | Performed by: FAMILY MEDICINE

## 2022-09-08 NOTE — PROGRESS NOTES
Argyle Olszewski (: 1960) is a 57 y.o. female, established patient, here for evaluation of the following chief complaint(s):  Follow-up, Diabetes, and Labs (Fasting today. )         ASSESSMENT/PLAN:  Below is the assessment and plan developed based on review of pertinent history, physical exam, labs, studies, and medications. 1. Controlled type 2 diabetes mellitus without complication, without long-term current use of insulin ([address]  Lab Results   Component Value Date/Time    Hemoglobin A1c 6.7 (H) 2022 09:49 AM    Hemoglobin A1c (POC) 6.3 2022 12:10 PM    Hemoglobin A1c, External 7.0 2021 12:00 AM     A1C remains well controlled, continue metformin 1000mg BID  -recheck A1C 3 to 6 months     2. Status post left knee replacement  S/p L TKR 8/10/2022 [facility] Dr. Gurvinder Crandall   Doing well, incision is healing well. She is taking tylenol for pain as other options limited secondary to allergies  She is not taking asa 81mg due to severe GI upset and understands increased risk of clotting. Per patient ortho is aware and she has discussed this with them     3. Mixed hyperlipidemia  Lab Results   Component Value Date/Time    Cholesterol, total 125 02/10/2022 09:25 AM    HDL Cholesterol 68 02/10/2022 09:25 AM    LDL, calculated 41.8 02/10/2022 09:25 AM    VLDL, calculated 15.2 02/10/2022 09:25 AM    Triglyceride 76 02/10/2022 09:25 AM    CHOL/HDL Ratio 1.8 02/10/2022 09:25 AM     Continue statin, recheck 6 months. 4. Essential hypertension  At goal in office, continue losartan 50mg and lasic 40mg. Return in about 3 months (around 2022). SUBJECTIVE/OBJECTIVE:  Chief Complaint   Patient presents with    Follow-up    Diabetes    Labs     Fasting today. 58 yo female presenting to office for diabetes, HTN check. She had L Total knee replacement 8/10/2022. Doing very well. She was given mobic for pain as well as ASA 81mg BID.  She experienced severe epigastric discomfort and nausea while taking these medications and stopped them a week ago. She did call to discuss with ortho and recommended tylenol at this point for pain as she has allergies to many pain medications including narcotics. They did discuss increased risk of clots as she is not taking ASA and patient understands risks. She has not had any lower extremity swelling, calf pain, SOB, cough or chest pain. Epigastric pain resolved once she stopped meloxicam and asa. PT came to home for 3 weeks, now Starting outpatient PT 9/14/2022 as she is able to drive. Incision is healing well. Endocrine is planning for biopsy of thyroid nodule on 9/14/2022, patient is apprehensive but plans to go through with this. A1C was checked with preop testing 8/3/2022 and was well controlled at 6.7. Otherwise patient has no complaints. Her  was recently diagnosed with alzheimer's dementia, doing OK and still at home. Review of Systems   Constitutional:  Negative for chills, fatigue and fever. Eyes:  Negative for pain and visual disturbance. Respiratory:  Negative for cough, chest tightness, shortness of breath and wheezing. Cardiovascular:  Negative for chest pain, palpitations and leg swelling. Gastrointestinal:  Negative for abdominal distention, abdominal pain, constipation, diarrhea, nausea and vomiting. Genitourinary:  Negative for dysuria, flank pain, frequency and hematuria. Musculoskeletal:  Positive for arthralgias (Left knee). Negative for joint swelling and myalgias. Skin:  Negative for rash. Neurological:  Negative for dizziness, weakness, light-headedness and headaches. Psychiatric/Behavioral:  Negative for behavioral problems. The patient is not nervous/anxious. Current Outpatient Medications on File Prior to Visit   Medication Sig Dispense Refill    HYDROcodone-acetaminophen (NORCO) 5-325 mg per tablet Take 1 Tablet by mouth every four (4) hours as needed for Pain.       furosemide (LASIX) 40 mg tablet Take 40 mg by mouth Every morning. losartan (COZAAR) 50 mg tablet Take 50 mg by mouth Every morning. metFORMIN (GLUCOPHAGE) 500 mg tablet TAKE 2 TABLETS BY MOUTH TWICE A DAY WITH MEALS 120 Tablet 2    atorvastatin (LIPITOR) 40 mg tablet TK 1 T PO QD HS      [DISCONTINUED] acetaminophen (Tylenol Arthritis Pain) 650 mg TbER Take 1 Tablet by mouth every eight (8) hours. (Patient not taking: Reported on 9/8/2022) 60 Tablet 1    [DISCONTINUED] aspirin delayed-release 81 mg tablet Take 1 Tablet by mouth two (2) times a day. 60 Tablet 0    [DISCONTINUED] meloxicam (Mobic) 15 mg tablet Take 1 Tablet by mouth in the morning for 30 days. 30 Tablet 0    [DISCONTINUED] TURMERIC, BULK, Take 1 Dose by mouth Every morning. (Patient not taking: Reported on 9/8/2022)      [DISCONTINUED] mckinley, Zingiber officinalis, (mckinley extract) 250 mg cap Take 1 Capsule by mouth Every morning. (Patient not taking: Reported on 9/8/2022)      [DISCONTINUED] Apple Cider Vinegar 300 mg tab Take 1 Tablet by mouth Every morning. (Patient not taking: Reported on 9/8/2022)       No current facility-administered medications on file prior to visit.      Patient Active Problem List    Diagnosis Date Noted    Arthritis of left knee 08/10/2022    Heart murmur, systolic 34/16/8882    Lymphadenopathy, inguinal 03/24/2022    History of partial nephrectomy 09/30/2020    Renal cancer, left ([facility] 09/14/2020    Controlled type 2 diabetes mellitus without complication, without long-term current use of insulin ([facility] 06/22/2020    Hyperlipidemia 01/21/2020    Morbid obesity ([facility] 09/24/2019    Chronic diastolic congestive heart failure ([facility] 04/17/2018    Osteoarthritis of knee 04/13/2017    Essential hypertension 04/13/2017    Allergy to penicillin 04/13/2017    S/P vaginal hysterectomy 07/19/2012    Stress incontinence in female 07/12/2012    Pelvic relaxation disorder 07/12/2012     Allergies   Allergen Reactions    Other Food Itching     Requires hypoallergenic sheets when hospitalized (from 2020 in [facility])    Shellfish Containing Products Anaphylaxis      Mouth and throat swelling with smoked oysters. But can tolerate shrimp and crabs      Hydromorphone Other (comments)    Cephalexin Rash     Ancef challenge on 8/10/22    Doxycycline Nausea Only    Hydrocodone-Acetaminophen Itching    Meperidine Itching    Oxycodone-Acetaminophen Hives and Rash    Penicillins Hives and Itching    Propoxyphene Hives and Rash    Propoxyphene N-Acetaminophen Hives and Rash    Sulfa (Sulfonamide Antibiotics) Itching    Triamterene-Hydrochlorothiazid Myalgia     Past Surgical History:   Procedure Laterality Date    HX HEART CATHETERIZATION  2020    HX HYSTERECTOMY  2006    approx date    HX KNEE REPLACEMENT Left     HX NEPHRECTOMY  09/09/2020    Robotic assisted laparoscopic left partial nephrectomy    HX OTHER SURGICAL       Social History     Tobacco Use    Smoking status: Never    Smokeless tobacco: Never   Vaping Use    Vaping Use: Never used   Substance Use Topics    Alcohol use: Yes     Comment: once monthly    Drug use: Never     Family History   Problem Relation Age of Onset    Lung Cancer Father     Heart Disease Maternal Grandmother     Heart Disease Mother     Breast Cancer Paternal Aunt      Vitals:    09/08/22 0924   BP: 123/68   Pulse: 70   Resp: 16   Temp: 98.1 °F (36.7 °C)   TempSrc: Oral   SpO2: 97%   Weight: 201 lb 4.8 oz (91.3 kg)   Height: 5' 2.5\" (1.588 m)   PainSc:   4   PainLoc: Knee        Physical Exam  Constitutional:       Appearance: Normal appearance. HENT:      Head: Normocephalic and atraumatic. Eyes:      Extraocular Movements: Extraocular movements intact. Conjunctiva/sclera: Conjunctivae normal.      Pupils: Pupils are equal, round, and reactive to light. Cardiovascular:      Rate and Rhythm: Normal rate and regular rhythm. Pulses: Normal pulses. Heart sounds: Normal heart sounds.    Pulmonary:      Effort: Pulmonary effort is normal.      Breath sounds: Normal breath sounds. Abdominal:      General: Abdomen is flat. Bowel sounds are normal.      Palpations: Abdomen is soft. Musculoskeletal:      Comments: Well healing incision L knee. No swelling, erythema or drainage. No lower extremity swelling or calf pain   Neurological:      Mental Status: She is alert. Gait: Gait abnormal (antalgic gait, using walker secondary to knee pain and stiffness from L knee replacement). Psychiatric:         Mood and Affect: Mood normal.         Behavior: Behavior normal.       An electronic signature was used to authenticate this note.   -- Manny Lund PA-C

## 2022-09-08 NOTE — PROGRESS NOTES
Marcella Mohan is a 57 y.o. female , id x 2(name and ). Reviewed record, history, and  medications. Chief Complaint   Patient presents with    Follow-up    Diabetes    Labs     Fasting today. Pt has no other concerns. Vitals:    22 0924   BP: 123/68   Pulse: 70   Resp: 16   Temp: 98.1 °F (36.7 °C)   TempSrc: Oral   SpO2: 97%   Weight: 201 lb 4.8 oz (91.3 kg)   Height: 5' 2.5\" (1.588 m)       Coordination of Care Questionnaire:   1. Have you been to the ER, urgent care clinic since your last visit? Hospitalized since your last visit? Yes [facility] x 4 weeks ago    2. Have you seen or consulted any other health care providers outside of the [address] [place] [address] since your last visit? Include any pap smears or colon screening.  No

## 2022-09-14 ENCOUNTER — OFFICE VISIT (OUTPATIENT)
Dept: ENDOCRINOLOGY | Age: 62
End: 2022-09-14
Payer: COMMERCIAL

## 2022-09-14 VITALS
HEIGHT: 63 IN | DIASTOLIC BLOOD PRESSURE: 66 MMHG | BODY MASS INDEX: 35.61 KG/M2 | SYSTOLIC BLOOD PRESSURE: 125 MMHG | OXYGEN SATURATION: 99 % | TEMPERATURE: 98.8 F | HEART RATE: 79 BPM | WEIGHT: 201 LBS

## 2022-09-14 DIAGNOSIS — E04.2 MULTINODULAR GOITER: Primary | ICD-10-CM

## 2022-09-14 PROCEDURE — 10005 FNA BX W/US GDN 1ST LES: CPT | Performed by: INTERNAL MEDICINE

## 2022-09-14 NOTE — LETTER
9/14/2022    Patient: Silvia Herrera   YOB: 1960   Date of Visit: 9/14/2022     Paige Bear PA-C  [address] [id number] [facility]    Dear Paige Bear PA-C,      Thank you for referring Ms. Jb Otero to [address] [place] [address] for evaluation. My notes for this consultation are attached. If you have questions, please do not hesitate to call me. I look forward to following your patient along with you.       Sincerely,    Andrea Patino MD

## 2022-09-14 NOTE — PATIENT INSTRUCTIONS
SPECIFIC INSTRUCTIONS BELOW     Please take tylenol 500 mg or Motrin 400 mg (2 pills of 200 mg dose) OTC,  if there is any biopsy site pain    You can go back to your normal routine immediately    If you notice any dime sized redness, at the biopsy site, it is normal and do not worry     If you have more symptoms and signs requiring emergency assistance,  call 911   or go to Emergency room           -------------Sam 1962 -----------------      - The medications prescribed at this visit will not be available at pharmacy until 6 pm       - YOUR MED LIST IS NOT UP TO DATE AS SOME CHANGES ARE BEING MADE AFTER THE VISIT - [address] [place] [address]     -ANY tests other than blood work, which you opt to do  outside the  [facility] imaging facilities, you are responsible for prior authorizations if  required    - HEALTH MAINTENANCE IS NOT GOING TO BE UP TO DATE ON YOUR AVS- PLEASE IGNORE     Results     *Normal results will not be notified by a phone call starting January 1 2021   *If you have an upcoming visit, the results will be discussed at the visit   *Please sign up for MY CHART if you want access to your lab and test results  *Abnormal results which require immediate attention will be notified by phone call   *Abnormal results which do not require immediate assistance will be notified in 1-2 weeks       Refills    -    have your pharmacy send us a refill request . Refills are done max for one year and a visit is a must before refills are extended    Follow up appointments -  highly encourage you to make it when you are checking out. We can accommodate you into the schedule based on your clinical situation, but not for extending refills beyond a year. Labs are important to give refills and is important to get labs before the visit     Phone calls  -  Allow  24 hrs.  for non-urgent calls to be returned  Prior authorization - It may take 2-4 weeks to process  Forms  -  FMLA, DMV etc., will take up to 2 weeks to process  Cancellations - please notify the office 2 days in advance   Samples  - will only be dispensed at visits       If not showing for the appointments and cancelling appointments within 24 hours are kept track of and three  of such situations in  two consecutive years will likely be considered for termination from the practice    -------------------------------------------------------------------------------------------------------------------

## 2022-09-15 NOTE — PROGRESS NOTES
[facility] DIABETES & ENDOCRINOLOGY  OFFICE PROCEDURE PROGRESS NOTE        Chart reviewed for the following:   Maya Newton MD, have reviewed the History, Physical and updated the Allergic reactions for [address] [place] [address] performed immediately prior to start of procedure:   Maya Newton MD, have performed the following reviews on [facility] prior to the start of the procedure:            * Patient was identified by name and date of birth   * Agreement on procedure being performed was verified  * Risks and Benefits explained to the patient  * Procedure site verified and marked as necessary  * Patient was positioned for comfort  * Consent was signed and verified     Time: 230 PM    Pain scale : 0    Date of procedure: 9/14/2022    Procedure performed by:  Tori Singh MD    Provider assisted by: Ms. Sherri Watkins LPN          Real time imaging was performed in both transverse and sagittal planes    Nodule size : 1.8 cm LEFT   Following informed consent, a procedural pause was held to confirm patiient identity and site of biopsy. After sterile preparation, FNA guidance was performed using direct ultrasound guidance to confirm accurate needle placement. 5 aspirations were made using 25 G needles  Samples were submitted to cytology  Patient  tolerated procedure well without complications  After care instructions provided.       Pain scale post procedure : 2

## 2022-09-22 ENCOUNTER — DOCUMENTATION ONLY (OUTPATIENT)
Dept: ENDOCRINOLOGY | Age: 62
End: 2022-09-22

## 2022-09-22 NOTE — PROGRESS NOTES
Attempted to call. Unsuccessful. Left msg for Mali Cramer to give us a call back at the office. A callback number was left.

## 2022-09-22 NOTE — PROGRESS NOTES
Informed pt of Dr. Spence Face note. Pt verbalized understanding with no further questions or concerns at this time.

## 2022-09-22 NOTE — PROGRESS NOTES
Thyroid left side nodule  -  Benign by Afirma   from  date  sept 14 2022     Inform pt       Bob Shay MD

## 2022-09-30 ENCOUNTER — HOSPITAL ENCOUNTER (OUTPATIENT)
Dept: NUTRITION | Age: 62
Discharge: HOME | End: 2022-09-30
Payer: COMMERCIAL

## 2022-09-30 DIAGNOSIS — E11.9 CONTROLLED TYPE 2 DIABETES MELLITUS WITHOUT COMPLICATION, WITHOUT LONG-TERM CURRENT USE OF INSULIN (HCC): Primary | ICD-10-CM

## 2022-09-30 PROCEDURE — 97803 MED NUTRITION INDIV SUBSEQ: CPT | Performed by: DIETITIAN, REGISTERED

## 2022-09-30 NOTE — PROGRESS NOTES
NUTRITION - FOLLOW-UP TREATMENT NOTE  Patient Name: Rudy Aguilar         Date: 2022  : 1960    YES Patient  Verified  Diagnosis: E11.9 (ICD-10-CM) - Type 2 diabetes mellitus without complications   In time:   900am             Out time:   930am   Total Treatment Time (min):   30     SUBJECTIVE/ASSESSMENT  Current Wt: 201 Previous Wt: 208 Wt Change: -7     Initial Wt: 216 Total Wt change: -15 Height: 62     Changes in medication or medical history? Any new allergies, surgeries or procedures? YES   If yes, update Summary List   Left Knee Replacement   Physical therapy is going well      Nutrition Diagnosis        Diagnosis Status: Obesity R/T excessive energy intake & learned food patterns AEB BMI >30, dietary recall showing high calorie intake from fat, eating past full, and \"clean plate club\". [x]  Improved []  No Change    []  Declined   []  Discontinued     Food and nutrition related knowledge deficit R/T lack of prior education for diabetes and nutrition AEB pt questions during session. [x]  Improved []  No Change    []  Declined   []  Discontinued        Nutrition Monitoring and Evaluation: Pt seen today for follow-up visit. Pt has been doing very well over the past 3 months. Pt is now down a total of 15#s and has been able to get her knee replacement surgery. Pt is making sure to watch ezequiel portion sizes closely and she has been limiting sweets as much as possible. Pt is also mindful about using low carb alteratives for bread. Pt has been working to increase water as medications are causing constipation. Pt also feels that she could be doing better with vegetable intake. Pt is still enjoying fruit but having earlier in the day. Pt has not been checking blood sugar levels. Pt is participating in physical therapy for her knee but noted needing to use at home stationary bike and walking more. Pt is excited for upcoming trip to \[facility]\"".      SMBG: Not checking    Previous goals: - Plan for a snack around 2-3pm daily   - Increase walking daily   - Decrease portion sizes, avoid seconds and leave something on the plate. Nutrition Prescription and  Intervention Educated pt on the pathophysiology of Type II Diabetes, insulin resistance and the rationale for dietary modifications and increased activity. Educated pt on lean proteins, healthy fats, non-starchy vegetables, and complex carbohydrate food sources. Discussed limiting carbohydrates, label reading, meal timing, and appropriate serving sizes. Encouraged pt to avoid sugary beverages. Reviewed meal builder tool, calorie and macro breakdown as well as exercise parameters. Reviewed energy sources like protein, managing blood sugar levels, and getting in a variety of fruits and vegetables. Reviewed importance of protein with each meal. Answered pt questions about fruit and appropriate serving sizes.       Patient Education:  [x]  Review current plan with patient   []  Other:    Handouts/  Information Provided: []  Carbohydrates  []  Protein  []  Fiber  []  Serving Sizes  []  Fluids  []  General guidelines []  Diabetes  []  Cholesterol  []  Sodium  []  SBGM  []  Food Journals  []  Others:      Patient Goals - Add in more vegetables with meals   - Check blood sugar levels daily   - Increase walking/biking at home, 10 min daily      PLAN  [x]  Continue on current plan []  Follow-up PRN   []  Discharge due to :    [x]  Next appt: 3 weeks      Dietitian: Marcela Klein RDN    Date: 9/30/2022 Time: 900 AM

## 2022-10-19 RX ORDER — ATORVASTATIN CALCIUM 40 MG/1
TABLET, FILM COATED ORAL
Qty: 90 TABLET | Refills: 1 | Status: SHIPPED | OUTPATIENT
Start: 2022-10-19 | End: 2022-10-24 | Stop reason: SDUPTHER

## 2022-10-24 DIAGNOSIS — E78.2 MIXED HYPERLIPIDEMIA: ICD-10-CM

## 2022-10-24 DIAGNOSIS — E11.9 CONTROLLED TYPE 2 DIABETES MELLITUS WITHOUT COMPLICATION, WITHOUT LONG-TERM CURRENT USE OF INSULIN (HCC): Primary | ICD-10-CM

## 2022-10-24 RX ORDER — ATORVASTATIN CALCIUM 40 MG/1
40 TABLET, FILM COATED ORAL DAILY
Qty: 90 TABLET | Refills: 1 | Status: SHIPPED | OUTPATIENT
Start: 2022-10-24

## 2022-10-30 RX ORDER — FUROSEMIDE 40 MG/1
TABLET ORAL
Qty: 30 TABLET | Refills: 1 | Status: SHIPPED | OUTPATIENT
Start: 2022-10-30 | End: 2022-11-30

## 2022-11-01 ENCOUNTER — HOSPITAL ENCOUNTER (OUTPATIENT)
Dept: NUTRITION | Age: 62
Discharge: HOME | End: 2022-11-01
Payer: COMMERCIAL

## 2022-11-01 DIAGNOSIS — E11.9 CONTROLLED TYPE 2 DIABETES MELLITUS WITHOUT COMPLICATION, WITHOUT LONG-TERM CURRENT USE OF INSULIN (HCC): Primary | ICD-10-CM

## 2022-11-01 PROCEDURE — 97803 MED NUTRITION INDIV SUBSEQ: CPT | Performed by: DIETITIAN, REGISTERED

## 2022-11-01 NOTE — PROGRESS NOTES
NUTRITION - FOLLOW-UP TREATMENT NOTE  Patient Name: Shamika Zafar         Date: 2022  : 1960    YES Patient  Verified  Diagnosis: E11.9 (ICD-10-CM) - Type 2 diabetes mellitus without complications   In time:   1000am             Out time:  1030am   Total Treatment Time (min):   30     SUBJECTIVE/ASSESSMENT  Current Wt: 201 Previous Wt: 201 Wt Change: 0     Initial Wt: 216 Total Wt change: -15 Height: 62     Changes in medication or medical history? Any new allergies, surgeries or procedures? YES   If yes, update Summary List   Left Knee Replacement   Physical therapy is going well      Nutrition Diagnosis        Diagnosis Status: Obesity R/T excessive energy intake & learned food patterns AEB BMI >30, dietary recall showing high calorie intake from fat, eating past full, and \"clean plate club\". [x]  Improved []  No Change    []  Declined   []  Discontinued     Food and nutrition related knowledge deficit R/T lack of prior education for diabetes and nutrition AEB pt questions during session. [x]  Improved []  No Change    []  Declined   []  Discontinued        Nutrition Monitoring and Evaluation: Pt seen today for follow-up visit. Pt has been doing well with food choices. Pt has been looking for low carb alternatives and especially enjoys the low carb bread that she is having. Pt is checking blood sugar 1-2x week recently seeing between 117-121 mg/dL. Pt feels that she is getting in a good amount of fruit and vegetables but is still having to take a supplement to help with constipation. We discussed increasing water intake. Pt is exercising through PT and bike at home. Pt noted hip pain recently that has been making it hard for her to sleep at night. Encouraged pt to discuss this pain with PT tomorrow at [facility]. Pt will be celebrating some family birthdays and Thanksgiving in the next few weeks. We discussed portion sizes and watching intake of carbohydrate foods.      SMB-121 mg-dL    Previous goals:  - Add in more vegetables with meals   - Check blood sugar levels daily   - Increase walking/biking at home, 10 min daily      Nutrition Prescription and  Intervention Educated pt on the pathophysiology of Type II Diabetes, insulin resistance and the rationale for dietary modifications and increased activity. Educated pt on lean proteins, healthy fats, non-starchy vegetables, and complex carbohydrate food sources. Discussed limiting carbohydrates, label reading, meal timing, and appropriate serving sizes. Encouraged pt to avoid sugary beverages. Reviewed meal builder tool, calorie and macro breakdown as well as exercise parameters. Reviewed energy sources like protein, managing blood sugar levels, and getting in a variety of fruits and vegetables. Reviewed importance of protein with each meal. Answered pt questions about fruit and appropriate serving sizes.       Patient Education:  [x]  Review current plan with patient   []  Other:    Handouts/  Information Provided: []  Carbohydrates  []  Protein  []  Fiber  []  Serving Sizes  []  Fluids  []  General guidelines []  Diabetes  []  Cholesterol  []  Sodium  []  SBGM  []  Food Journals  [x]  Others: Holiday Tips      Patient Goals - Keep up with exercise (walking and biking daily 10 min)   - Increase water intake to 4 bottles daily   - Thanksgiving mindful portions and watch carbs     PLAN  [x]  Continue on current plan []  Follow-up PRN   []  Discharge due to :    [x]  Next appt: 3 weeks      Dietitian: Tiffanie Otero RDN    Date: 11/1/2022 Time: 1000 AM

## 2022-11-07 ENCOUNTER — OFFICE VISIT (OUTPATIENT)
Dept: FAMILY MEDICINE CLINIC | Age: 62
End: 2022-11-07
Payer: COMMERCIAL

## 2022-11-07 ENCOUNTER — PATIENT MESSAGE (OUTPATIENT)
Dept: FAMILY MEDICINE CLINIC | Age: 62
End: 2022-11-07

## 2022-11-07 VITALS
BODY MASS INDEX: 35.12 KG/M2 | HEIGHT: 63 IN | OXYGEN SATURATION: 99 % | TEMPERATURE: 98.5 F | DIASTOLIC BLOOD PRESSURE: 77 MMHG | SYSTOLIC BLOOD PRESSURE: 124 MMHG | HEART RATE: 73 BPM | WEIGHT: 198.2 LBS | RESPIRATION RATE: 18 BRPM

## 2022-11-07 DIAGNOSIS — J02.9 SORE THROAT: ICD-10-CM

## 2022-11-07 DIAGNOSIS — B34.9 VIRAL ILLNESS: Primary | ICD-10-CM

## 2022-11-07 LAB
S PYO AG THROAT QL: NEGATIVE
VALID INTERNAL CONTROL?: YES

## 2022-11-07 PROCEDURE — 87880 STREP A ASSAY W/OPTIC: CPT | Performed by: FAMILY MEDICINE

## 2022-11-07 PROCEDURE — 3078F DIAST BP <80 MM HG: CPT | Performed by: FAMILY MEDICINE

## 2022-11-07 PROCEDURE — 3074F SYST BP LT 130 MM HG: CPT | Performed by: FAMILY MEDICINE

## 2022-11-07 PROCEDURE — 99213 OFFICE O/P EST LOW 20 MIN: CPT | Performed by: FAMILY MEDICINE

## 2022-11-07 RX ORDER — GUAIFENESIN 600 MG/1
600 TABLET, EXTENDED RELEASE ORAL 2 TIMES DAILY
Qty: 12 TABLET | Refills: 0 | Status: SHIPPED | OUTPATIENT
Start: 2022-11-07

## 2022-11-07 RX ORDER — BENZONATATE 200 MG/1
200 CAPSULE ORAL
Qty: 21 CAPSULE | Refills: 0 | Status: SHIPPED | OUTPATIENT
Start: 2022-11-07 | End: 2022-11-14

## 2022-11-07 RX ORDER — ONDANSETRON 4 MG/1
4 TABLET, ORALLY DISINTEGRATING ORAL
Qty: 20 TABLET | Refills: 0 | Status: SHIPPED | OUTPATIENT
Start: 2022-11-07

## 2022-11-07 RX ORDER — DEXTROMETHORPHAN POLISTIREX 30 MG/5ML
60 SUSPENSION ORAL 2 TIMES DAILY
Qty: 200 ML | Refills: 0 | Status: SHIPPED | OUTPATIENT
Start: 2022-11-07 | End: 2022-11-17

## 2022-11-07 RX ORDER — OSELTAMIVIR PHOSPHATE 75 MG/1
75 CAPSULE ORAL 2 TIMES DAILY
Qty: 10 CAPSULE | Refills: 0 | Status: SHIPPED | OUTPATIENT
Start: 2022-11-07 | End: 2022-11-12

## 2022-11-07 NOTE — PROGRESS NOTES
Laila Vanegas is a 57 y.o. female , id x 2(name and ). Reviewed record, history, and  medications. Chief Complaint   Patient presents with    Sinus Infection     Experiencing cough, congestion, chills, sore throat x 2 days. Stomach pain after taking Tylenol Severe Sinus. Vitals:    22 1430   BP: 124/77   Pulse: 73   Resp: 18   Temp: 98.5 °F (36.9 °C)   TempSrc: Oral   SpO2: 99%   Weight: 198 lb 3.2 oz (89.9 kg)   Height: 5' 2.5\" (1.588 m)       Coordination of Care Questionnaire:   1. Have you been to the ER, urgent care clinic since your last visit? Hospitalized since your last visit? No    2. Have you seen or consulted any other health care providers outside of the [address] [place] [address] since your last visit? Include any pap smears or colon screening.  No

## 2022-11-15 RX ORDER — METFORMIN HYDROCHLORIDE 500 MG/1
TABLET ORAL
Qty: 120 TABLET | Refills: 5 | Status: SHIPPED | OUTPATIENT
Start: 2022-11-15

## 2022-11-30 RX ORDER — FUROSEMIDE 40 MG/1
TABLET ORAL
Qty: 30 TABLET | Refills: 1 | Status: SHIPPED | OUTPATIENT
Start: 2022-11-30

## 2022-12-06 ENCOUNTER — OFFICE VISIT (OUTPATIENT)
Dept: ENDOCRINOLOGY | Age: 62
End: 2022-12-06
Payer: COMMERCIAL

## 2022-12-06 VITALS
HEART RATE: 65 BPM | DIASTOLIC BLOOD PRESSURE: 62 MMHG | SYSTOLIC BLOOD PRESSURE: 136 MMHG | BODY MASS INDEX: 34.94 KG/M2 | HEIGHT: 63 IN | OXYGEN SATURATION: 100 % | WEIGHT: 197.2 LBS | TEMPERATURE: 97.8 F

## 2022-12-06 DIAGNOSIS — E83.52 HYPERCALCEMIA: Primary | ICD-10-CM

## 2022-12-06 DIAGNOSIS — E04.2 MULTINODULAR GOITER: ICD-10-CM

## 2022-12-06 DIAGNOSIS — M81.0 SENILE OSTEOPOROSIS: ICD-10-CM

## 2022-12-06 PROCEDURE — 3074F SYST BP LT 130 MM HG: CPT | Performed by: INTERNAL MEDICINE

## 2022-12-06 PROCEDURE — 3078F DIAST BP <80 MM HG: CPT | Performed by: INTERNAL MEDICINE

## 2022-12-06 PROCEDURE — 99214 OFFICE O/P EST MOD 30 MIN: CPT | Performed by: INTERNAL MEDICINE

## 2022-12-06 NOTE — PATIENT INSTRUCTIONS
SPECIFIC INSTRUCTIONS BELOW       No meds       -------------PAY ATTENTION TO THESE GENERAL INSTRUCTIONS -----------------      - The medications prescribed at this visit will not be available at pharmacy until 6 pm       - YOUR MED LIST IS NOT UP TO DATE AS SOME CHANGES ARE BEING MADE AFTER THE VISIT - FOLLOW SPECIFIC INSTRUCTIONS  ABOVE     -ANY tests other than blood work, which you opt to do  outside the  [facility] facilities, you are responsible for prior authorizations if  required    - [facility]- PLEASE IGNORE     Results     *Normal results will not be notified by a phone call starting January 1 2021   *If you have an upcoming visit, the results will be discussed at the visit   *Please sign up for MY CHART if you want access to your lab and test results  *Abnormal results which require immediate attention will be notified by phone call   *Abnormal results which do not require immediate assistance will be notified in 1-2 weeks       Refills    -    have your pharmacy send us a refill request . Refills are done max for one year and a visit is a must before refills are extended    Follow up appointments -  highly encourage you to make it when you are checking out. We can accommodate you into the schedule based on your clinical situation, but not for extending refills beyond a year. Labs are important to give refills and is important to get labs before the visit     Phone calls  -  Allow  24 hrs.  for non-urgent calls to be returned  Prior authorization - It may take 2-4 weeks to process  Forms  -  FMLA, DMV etc., will take up to 2 weeks to process  Cancellations - please notify the office 2 days in advance   Samples  - will only be dispensed at visits       If not showing for the appointments and cancelling appointments within 24 hours are kept track of and three  of such situations in  two consecutive years will likely be considered for termination from the practice    -------------------------------------------------------------------------------------------------------------------

## 2022-12-06 NOTE — PROGRESS NOTES
HISTORY OF PRESENT ILLNESS  Urban Figures is a 57 y.o. female. Follow up  After  last  visit for hypercalcemia, MNG ,  From June 2022     During eval for hypercalcemia, she had usg and that showed thyrodi nodule   So, she underwent FNA  in sept 2022 June 2022     Here to discuss results      March 2022   The patient has  asymptomatic elevation of the serum and calcium and parathormone. She has not  been taking medication : thiazide diuretic   She has not had previous history of head or neck radiation. She does not have familial history of endocrine malignancies. Patient reports being dizzy occasionally and her BP today is on lower side     Review of Systems   None       Physical Exam   Constitutional: She is oriented to person, place, and time. She appears well-developed and well-nourished. Thyromegaly present   Psychiatric: She has a normal mood and affect. Lab Results   Component Value Date/Time    ALT (SGPT) 17 11/29/2022 10:21 AM    Alk. phosphatase 72 11/29/2022 10:21 AM    Bilirubin, total 0.8 11/29/2022 10:21 AM    Albumin 3.5 11/29/2022 10:21 AM    Protein, total 7.0 11/29/2022 10:21 AM    INR 0.94 08/20/2020 11:00 AM    Prothrombin time 10.5 08/20/2020 11:00 AM    PLATELET 638 59/28/7467 09:49 AM     Lab Results   Component Value Date/Time    GFR est non-AA >60 08/11/2022 02:35 AM    GFRNA, POC >60 05/06/2022 03:53 PM    GFR est AA >60 08/11/2022 02:35 AM    GFRAA, POC >60 05/06/2022 03:53 PM    Creatinine 0.60 11/29/2022 10:21 AM    Creatinine (POC) 0.70 05/06/2022 03:53 PM    BUN 13 11/29/2022 10:21 AM    Sodium 140 11/29/2022 10:21 AM    Potassium 3.9 11/29/2022 10:21 AM    Chloride 105 11/29/2022 10:21 AM    CO2 30 11/29/2022 10:21 AM    PTH, Intact 66.9 11/29/2022 10:21 AM     Lab Results   Component Value Date/Time    TSH 3.19 11/29/2022 10:21 AM        ASSESSMENT and PLAN    1.  Hypercalcemia  : Patient has moderate range of hypercalcemia, chronic and asymptomatic     primary hyperparathyroidism    Her's is around 10 - acceptable range  Never crossed 11 gm /dl   Upon review of labs   No h/o renal stones    parathyroid scan, negative    PTH rel peptide  -  Negative    24 hr urine for calcium, sodium , creatinine  - low calcium   She does not require aggressive intervention , opting for  for watchful observation    2. H/ o renal cancer July 2020  -  Left kidney - s/p  Nephrectomy     3. Thyroid nodules / MNG :   April 2022  - reviewed the usg  - A 1.3 x 1.0 x 1.6 cm solid nodule is again seen in the left thyroid lobe  S/p FNA in office  - sept 14 2022  - benign by afirma     Will do follow up  in a year bnv    Euthyroid  And  Asymptomatic        4. Has h/o CHF - following Dr. Rocio Grimm         5. Date : July 2022   Bone DEXA  ap spine T-score -0.4; left femoral Neck T- score  -0.4, right femoral neck T-score-0.1  10 year probability of major osteoporotic fracture: 3.6%. 10 year probability of hip fracture: 0.1%.        BMD is good - she can take one pill of calcium a day         Follow up in a year     Reviewed results with patient and discussed the labs being ordered today/bnv  Patient voiced understanding of plan of care

## 2022-12-06 NOTE — LETTER
12/6/2022    Patient: Urban Figures   YOB: 1960   Date of Visit: 12/6/2022     Vaibhav Hollins PA-C  [address] [facility] [address] [facility]    Dear Vaibhav Hollins PA-C,      Thank you for referring Ms. Anh Sparrow to [id number] [place] [address] for evaluation. My notes for this consultation are attached. If you have questions, please do not hesitate to call me. I look forward to following your patient along with you.       Sincerely,    Aliya Tejeda MD

## 2022-12-08 ENCOUNTER — OFFICE VISIT (OUTPATIENT)
Dept: FAMILY MEDICINE CLINIC | Age: 62
End: 2022-12-08
Payer: COMMERCIAL

## 2022-12-08 VITALS
HEART RATE: 68 BPM | HEIGHT: 63 IN | RESPIRATION RATE: 16 BRPM | WEIGHT: 197.1 LBS | SYSTOLIC BLOOD PRESSURE: 118 MMHG | BODY MASS INDEX: 34.92 KG/M2 | TEMPERATURE: 98.1 F | OXYGEN SATURATION: 98 % | DIASTOLIC BLOOD PRESSURE: 71 MMHG

## 2022-12-08 DIAGNOSIS — I50.32 CHRONIC DIASTOLIC CONGESTIVE HEART FAILURE (HCC): ICD-10-CM

## 2022-12-08 DIAGNOSIS — E11.9 CONTROLLED TYPE 2 DIABETES MELLITUS WITHOUT COMPLICATION, WITHOUT LONG-TERM CURRENT USE OF INSULIN (HCC): Primary | ICD-10-CM

## 2022-12-08 DIAGNOSIS — E78.2 MIXED HYPERLIPIDEMIA: ICD-10-CM

## 2022-12-08 DIAGNOSIS — I10 ESSENTIAL HYPERTENSION: ICD-10-CM

## 2022-12-08 LAB
CHOLEST SERPL-MCNC: 104 MG/DL
EST. AVERAGE GLUCOSE BLD GHB EST-MCNC: 131 MG/DL
HBA1C MFR BLD: 6.2 % (ref 4–5.6)
HDLC SERPL-MCNC: 66 MG/DL
HDLC SERPL: 1.6 {RATIO} (ref 0–5)
LDLC SERPL CALC-MCNC: 24.6 MG/DL (ref 0–100)
TRIGL SERPL-MCNC: 67 MG/DL (ref ?–150)
VLDLC SERPL CALC-MCNC: 13.4 MG/DL

## 2022-12-08 RX ORDER — LOSARTAN POTASSIUM 25 MG/1
25 TABLET ORAL DAILY
Qty: 30 TABLET | Refills: 0 | Status: SHIPPED | OUTPATIENT
Start: 2022-12-08

## 2022-12-08 RX ORDER — METFORMIN HYDROCHLORIDE 500 MG/1
1000 TABLET, EXTENDED RELEASE ORAL 2 TIMES DAILY WITH MEALS
Qty: 120 TABLET | Refills: 1 | Status: SHIPPED | OUTPATIENT
Start: 2022-12-08

## 2022-12-08 NOTE — PROGRESS NOTES
Isa Bhatt is a 57 y.o. female , id x 2(name and ). Reviewed record, history, and  medications. Chief Complaint   Patient presents with    Follow-up    Diabetes       Vitals:    22 0911 22 0919   BP: (!) 123/56 118/71   Pulse: 68    Resp: 16    Temp: 98.1 °F (36.7 °C)    TempSrc: Oral    SpO2: 98%    Weight: 197 lb 1.6 oz (89.4 kg)    Height: 5' 2.5\" (1.588 m)        Coordination of Care Questionnaire:   1. Have you been to the ER, urgent care clinic since your last visit? Hospitalized since your last visit? No    2. Have you seen or consulted any other health care providers outside of the [address] [place] [address] since your last visit? Include any pap smears or colon screening.  No

## 2022-12-08 NOTE — PROGRESS NOTES
Karli Hall (: 1960) is a 57 y.o. female, established patient, here for evaluation of the following chief complaint(s):  Follow-up and Diabetes         ASSESSMENT/PLAN:  Below is the assessment and plan developed based on review of pertinent history, physical exam, labs, studies, and medications. 1. Controlled type 2 diabetes mellitus without complication, without long-term current use of insulin (HCC)  Previously well controlled on metformin however C/o odor and diarrhea with metformin, trial switching to XR. Check A1C today   She is up to date on eye exam   -     metFORMIN ER (GLUCOPHAGE XR) 500 mg tablet; Take 2 Tablets by mouth two (2) times daily (with meals). , Normal, Disp-120 Tablet, R-1  -     LIPID PANEL; Future  -     HEMOGLOBIN A1C WITH EAG; Future    2. Essential hypertension  BP borderline low in office and she has had a few low readings at home. Trial reducing losartan to 25mg, monitor BP at home and send [employer] message if >130/80 or <100/60  -     losartan (COZAAR) 25 mg tablet; Take 1 Tablet by mouth daily. , Normal, Disp-30 Tablet, R-0    3. Mixed hyperlipidemia  LDL goal <70 with T2DM, fasting labs today   She is doing well on atorvastatin 40mg   -     LIPID PANEL; Future    4. Chronic diastolic congestive heart failure ([address]  Follows with Dr. NAZ MILTON cardiology   Echo 3/2022 normal left venctricular function   Continue lasix 40mg   Reduce losartan to 25mg daily for hypotension   Add compression stockings for intermittent lower extremity edema     Follow up 1 mo for BP check       SUBJECTIVE/OBJECTIVE:  Chief Complaint   Patient presents with    Follow-up    Diabetes     Patient is a 57 at female with a essential hypertension, chronic diastolic just of heart failure, controlled type 2 diabetes, osteoarthritis, history of renal cancer presenting to office for follow-up diabetes.     She is taking metformin 1000 mg twice daily with meals for the last several years and this is controlled her blood sugars well. Fasting blood sugars between 117 and 130 in the AM.  She continues to follow a relatively heart healthy, low-cholesterol diet. Last week she was eating a lot of canned soup and noted she had slight increase in lower extremity swelling bilateral in her feet and ankles. 2 days ago she stopped drinking soup and continued Taking her Lasix and the swelling has completely resolved. There is no calf pain. No numbness or tingling in her lower extremities. No shortness of breath or chest pain. She does report her blood pressures has been running a little bit low, couple readings have been about 110/50. When diastolic is in the 43W she does feel tired but no lightheadedness or dizziness. She has not had any readings higher than systolic 970 over diastolic 80. Otherwise she is feeling well, has recovered from upper respiratory illness well. She is recovered well from her left knee replacement. She has been evaluated by endocrinology for hypercalcemia and they have recommended she can take calcium pill once per day for bone health as her calcium is remained less than 11. They will follow-up and recheck her labs in a year. Review of Systems   Constitutional:  Negative for fever and unexpected weight change. Eyes:  Negative for pain and visual disturbance. Respiratory:  Negative for cough, chest tightness and shortness of breath. Cardiovascular:  Negative for chest pain, palpitations and leg swelling. Gastrointestinal:  Negative for abdominal distention, abdominal pain, blood in stool, constipation and diarrhea. Endocrine: Negative for polyphagia. Neurological:  Negative for dizziness, light-headedness and headaches. Psychiatric/Behavioral:  Negative for agitation and behavioral problems. Current Outpatient Medications on File Prior to Visit   Medication Sig Dispense Refill    acetaminophen (TYLENOL EX STR ARTHRITIS PAIN PO) Take  by mouth.  2 in am and pm for arthritis      mv-mn/folic acid/vit [id number] (ALIVE ONCE DAILY WOMEN 50 PLUS PO) Take  by mouth. furosemide (LASIX) 40 mg tablet TAKE 1 TABLET BY MOUTH EVERY DAY 30 Tablet 1    atorvastatin (LIPITOR) 40 mg tablet Take 1 Tablet by mouth daily. 90 Tablet 1    [DISCONTINUED] metFORMIN (GLUCOPHAGE) 500 mg tablet TAKE 2 TABLETS BY MOUTH TWICE A DAY WITH MEALS 120 Tablet 5    [DISCONTINUED] losartan (COZAAR) 50 mg tablet TAKE 1 TABLET BY MOUTH EVERY DAY 90 Tablet 1     No current facility-administered medications on file prior to visit. Patient Active Problem List    Diagnosis Date Noted    Arthritis of left knee 08/10/2022    Heart murmur, systolic 38/41/7137    Lymphadenopathy, inguinal 03/24/2022    History of partial nephrectomy 09/30/2020    Renal cancer, left ([facility] [address] 09/14/2020    Controlled type 2 diabetes mellitus without complication, without long-term current use of insulin ([facility] [address] 06/22/2020    Hyperlipidemia 01/21/2020    Morbid obesity ([facility] [address] 09/24/2019    Chronic diastolic congestive heart failure ([facility] [address] 04/17/2018    Osteoarthritis of knee 04/13/2017    Essential hypertension 04/13/2017    Allergy to penicillin 04/13/2017    S/P vaginal hysterectomy 07/19/2012    Stress incontinence in female 07/12/2012    Pelvic relaxation disorder 07/12/2012     Allergies   Allergen Reactions    Other Food Itching     Requires hypoallergenic sheets when hospitalized (from 2020 in [address]    Shellfish Containing Products Anaphylaxis      Mouth and throat swelling with smoked oysters.  But can tolerate shrimp and crabs      Hydromorphone Other (comments)    Cephalexin Rash     Ancef challenge on 8/10/22    Doxycycline Nausea Only    Hydrocodone-Acetaminophen Itching    Meperidine Itching    Oxycodone-Acetaminophen Hives and Rash    Penicillins Hives and Itching    Propoxyphene Hives and Rash    Propoxyphene N-Acetaminophen Hives and Rash    Sulfa (Sulfonamide Antibiotics) Itching Triamterene-Hydrochlorothiazid Myalgia     Past Surgical History:   Procedure Laterality Date    HX HEART CATHETERIZATION  2020    HX HYSTERECTOMY  2006    approx date    HX KNEE REPLACEMENT Left     HX NEPHRECTOMY  09/09/2020    Robotic assisted laparoscopic left partial nephrectomy    HX OTHER SURGICAL         Vitals:    12/08/22 0911 12/08/22 0919   BP: (!) 123/56 118/71   Pulse: 68    Resp: 16    Temp: 98.1 °F (36.7 °C)    TempSrc: Oral    SpO2: 98%    Weight: 197 lb 1.6 oz (89.4 kg)    Height: 5' 2.5\" (1.588 m)    PainSc:   4    PainLoc: Back         Physical Exam  Constitutional:       Appearance: Normal appearance. HENT:      Head: Normocephalic and atraumatic. Eyes:      Extraocular Movements: Extraocular movements intact. Conjunctiva/sclera: Conjunctivae normal.      Pupils: Pupils are equal, round, and reactive to light. Cardiovascular:      Rate and Rhythm: Normal rate and regular rhythm. Pulses: Normal pulses. Heart sounds: Normal heart sounds. Pulmonary:      Effort: Pulmonary effort is normal.      Breath sounds: Normal breath sounds. Abdominal:      General: Abdomen is flat. Bowel sounds are normal.      Palpations: Abdomen is soft. Musculoskeletal:      Cervical back: Normal range of motion and neck supple. Right lower leg: No edema (no calf pain). Left lower leg: No edema (no calf pain). Neurological:      General: No focal deficit present. Mental Status: She is alert and oriented to person, place, and time. Psychiatric:         Mood and Affect: Mood normal.         Behavior: Behavior normal.       An electronic signature was used to authenticate this note.   -- Sandra Lala PA-C

## 2022-12-11 NOTE — PROGRESS NOTES
Please call patient and advise   -labs show A1C is very well controlled, improved compared to 3 mo ago! Continue plan we discussed in office or as a1c is so well controlled she could also try decreasing metformin XR to 500mg BID rather than 1000BID to help with side effects.  If she wants to do this keep an eye on fasting glucose and let me know if they start to go above 130 fasting in AM.   -recheck A1C in 3 mo.  -cholesterol looks great, continue atorvastatin   -follow up 1 mo BP check as we decreased losartan at last visit

## 2022-12-12 NOTE — PROGRESS NOTES
Called and spoke with pt. Pt id x 2. Relayed the previous message per Yadira Cash PA-C. She verbalized understanding. Appt scheduled.

## 2022-12-13 ENCOUNTER — HOSPITAL ENCOUNTER (OUTPATIENT)
Dept: NUTRITION | Age: 62
Discharge: HOME | End: 2022-12-13
Payer: COMMERCIAL

## 2022-12-13 DIAGNOSIS — E11.9 CONTROLLED TYPE 2 DIABETES MELLITUS WITHOUT COMPLICATION, WITHOUT LONG-TERM CURRENT USE OF INSULIN (HCC): Primary | ICD-10-CM

## 2022-12-13 PROCEDURE — 97803 MED NUTRITION INDIV SUBSEQ: CPT | Performed by: DIETITIAN, REGISTERED

## 2022-12-13 NOTE — PROGRESS NOTES
NUTRITION - FOLLOW-UP TREATMENT NOTE  Patient Name: Rudy Aguilar         Date: 2022  : 1960    YES Patient  Verified  Diagnosis: E11.9 (ICD-10-CM) - Type 2 diabetes mellitus without complications   In time:   1000am             Out time:  1030am   Total Treatment Time (min):   30     SUBJECTIVE/ASSESSMENT  Current Wt: 198 Previous Wt: 201 Wt Change: -3     Initial Wt: 216 Total Wt change: -18 Height: 62     Changes in medication or medical history? Any new allergies, surgeries or procedures? YES   If yes, update Summary List   Lab Results   Component Value Date/Time    Hemoglobin A1c 6.2 (H) 2022 10:02 AM    Hemoglobin A1c 6.7 (H) 2022 09:49 AM    Hemoglobin A1c 7.4 (H) 02/10/2022 09:25 AM    Hemoglobin A1c, External 7.0 2021 12:00 AM     Lab Results   Component Value Date/Time    Cholesterol, total 104 2022 10:02 AM    HDL Cholesterol 66 2022 10:02 AM    LDL, calculated 24.6 2022 10:02 AM    VLDL, calculated 13.4 2022 10:02 AM    Triglyceride 67 2022 10:02 AM    CHOL/HDL Ratio 1.6 2022 10:02 AM          Nutrition Diagnosis        Diagnosis Status: Obesity R/T excessive energy intake & learned food patterns AEB BMI >30, dietary recall showing high calorie intake from fat, eating past full, and \"clean plate club\". [x]  Improved []  No Change    []  Declined   []  Discontinued     Food and nutrition related knowledge deficit R/T lack of prior education for diabetes and nutrition AEB pt questions during session. [x]  Improved []  No Change    []  Declined   []  Discontinued        Nutrition Monitoring and Evaluation: Pt seen today for follow-up visit. Review of pt recent lab results show A1c has dropped from 6.7 (diabetes) to 6.2 (Prediabetes). Pt has also lowered all cholesterol values. Pt feels very good about her choices over Thanksgiving. Pt made deliberate choices about foods that she truly wanted versus having everything.    Pt has had some sweets cravings in the past few days. Last night pt had some small donut holes and noted that was satisfying for the cravings. Pt blood sugars have been around 117-121 mg/dL which have improved. Pt is very satisfied with her weight loss and lab results. Pt is using her bike at home. Wanting to strengthen her arms up and we discussed seated arm raises with objects like canned goods. Pt also reports drinking more milk than previous. We discussed options like carb master milk which will have higher protein and less sugar per serving than regular milk. SMB-121 mg-dL    Previous goals:  - Keep up with exercise (walking and biking daily 10 min)   - Increase water intake to 4 bottles daily   - Thanksgiving mindful portions and watch carbs     Nutrition Prescription and  Intervention Educated pt on the pathophysiology of Type II Diabetes, insulin resistance and the rationale for dietary modifications and increased activity. Educated pt on lean proteins, healthy fats, non-starchy vegetables, and complex carbohydrate food sources. Discussed limiting carbohydrates, label reading, meal timing, and appropriate serving sizes. Encouraged pt to avoid sugary beverages. Reviewed meal builder tool, calorie and macro breakdown as well as exercise parameters. Reviewed energy sources like protein, managing blood sugar levels, and getting in a variety of fruits and vegetables. Reviewed importance of protein with each meal. Answered pt questions about fruit and appropriate serving sizes. Patient Education:  [x]  Review current plan with patient   []  Other:    Handouts/  Information Provided: []  Carbohydrates  []  Protein  []  Fiber  []  Serving Sizes  []  Fluids  []  General guidelines []  Diabetes  []  Cholesterol  []  Sodium  []  SBGM  []  Food Journals  [x]  Others: Holiday Tips      Patient Goals - Continue with using bike at home and stay moving day to day   - Limit sweets and keep blood sugar down.    - Maintain weight (1-2 lb weight loss)      PLAN  [x]  Continue on current plan []  Follow-up PRN   []  Discharge due to :    [x]  Next appt: 3 weeks      Dietitian: Marcela Klein RDN    Date: 12/13/2022 Time: 1000 AM

## 2022-12-23 RX ORDER — FUROSEMIDE 40 MG/1
TABLET ORAL
Qty: 30 TABLET | Refills: 1 | Status: SHIPPED | OUTPATIENT
Start: 2022-12-23

## 2023-01-12 ENCOUNTER — OFFICE VISIT (OUTPATIENT)
Dept: FAMILY MEDICINE CLINIC | Age: 63
End: 2023-01-12
Payer: COMMERCIAL

## 2023-01-12 VITALS
HEART RATE: 72 BPM | OXYGEN SATURATION: 99 % | DIASTOLIC BLOOD PRESSURE: 74 MMHG | BODY MASS INDEX: 35.08 KG/M2 | HEIGHT: 63 IN | WEIGHT: 198 LBS | SYSTOLIC BLOOD PRESSURE: 124 MMHG | TEMPERATURE: 97.9 F | RESPIRATION RATE: 18 BRPM

## 2023-01-12 DIAGNOSIS — J02.9 SORE THROAT: ICD-10-CM

## 2023-01-12 DIAGNOSIS — J06.9 VIRAL URI: ICD-10-CM

## 2023-01-12 DIAGNOSIS — H66.90 ACUTE OTITIS MEDIA, UNSPECIFIED OTITIS MEDIA TYPE: ICD-10-CM

## 2023-01-12 DIAGNOSIS — E11.9 CONTROLLED TYPE 2 DIABETES MELLITUS WITHOUT COMPLICATION, WITHOUT LONG-TERM CURRENT USE OF INSULIN (HCC): ICD-10-CM

## 2023-01-12 DIAGNOSIS — I10 ESSENTIAL HYPERTENSION: Primary | ICD-10-CM

## 2023-01-12 LAB
FLUAV+FLUBV AG NOSE QL IA.RAPID: NEGATIVE
FLUAV+FLUBV AG NOSE QL IA.RAPID: NEGATIVE
S PYO AG THROAT QL: NEGATIVE
VALID INTERNAL CONTROL?: YES
VALID INTERNAL CONTROL?: YES

## 2023-01-12 RX ORDER — BENZONATATE 200 MG/1
200 CAPSULE ORAL
Qty: 30 CAPSULE | Refills: 0 | Status: SHIPPED | OUTPATIENT
Start: 2023-01-12 | End: 2023-01-19

## 2023-01-12 RX ORDER — PROMETHAZINE HYDROCHLORIDE AND DEXTROMETHORPHAN HYDROBROMIDE 6.25; 15 MG/5ML; MG/5ML
5 SYRUP ORAL
Qty: 180 ML | Refills: 0 | Status: SHIPPED | OUTPATIENT
Start: 2023-01-12 | End: 2023-01-19

## 2023-01-12 RX ORDER — AZITHROMYCIN 250 MG/1
TABLET, FILM COATED ORAL
Qty: 6 TABLET | Refills: 0 | Status: SHIPPED | OUTPATIENT
Start: 2023-01-12 | End: 2023-01-17

## 2023-01-12 NOTE — PROGRESS NOTES
Emerald London (: 1960) is a 57 y.o. female, established patient, here for evaluation of the following chief complaint(s):  Follow-up, Blood Pressure Check, and Sinus Infection (Pt states runny nose, cough, pain and pressure in nasal cavity x 5 days. )         ASSESSMENT/PLAN:  Below is the assessment and plan developed based on review of pertinent history, physical exam, labs, studies, and medications. 1. Essential hypertension  Blood pressure at goal in office, no low readings since reducing losartan  Continue losartan 25mg and lasix    2. Sore throat  -     AMB POC RADHA INFLUENZA A/B TEST  -     AMB POC RAPID STREP A    3. Acute otitis media, unspecified otitis media type  Bilateral, add abx. Allergic to pcn and cephalosporins. Doxy causes nausea  -     azithromycin (ZITHROMAX) 250 mg tablet; Take 2 tablets today, then take 1 tablet daily, Normal, Disp-6 Tablet, R-0  4. Viral URI  Flu negative. Covid negative   Sx treatment and add abx for otitis media as above  Advised not to take more than 1 cough med at a time  -     promethazine-dextromethorphan (PROMETHAZINE-DM) 6.25-15 mg/5 mL syrup; Take 5 mL by mouth every four (4) hours as needed for Cough for up to 7 days. , Normal, Disp-180 mL, R-0  -     benzonatate (TESSALON) 200 mg capsule; Take 1 Capsule by mouth three (3) times daily as needed for Cough for up to 7 days. , Normal, Disp-30 Capsule, R-0  5. Controlled type 2 diabetes mellitus without complication, without long-term current use of insulin ([facility])  Fasting glucose at goal   Lab Results   Component Value Date/Time    Hemoglobin A1c 6.2 (H) 2022 10:02 AM    Hemoglobin A1c (POC) 6.3 2022 12:10 PM    Hemoglobin A1c, External 7.0 2021 12:00 AM     Continue metformin 500mg BID, if any low readings develop then patient instructed to reduce to once daily     Return for 4 to 5 months diabetes .       SUBJECTIVE/OBJECTIVE:  Chief Complaint   Patient presents with    Follow-up    Blood Pressure Check    Sinus Infection     Pt states runny nose, cough, pain and pressure in nasal cavity x 5 days. Patient is 78-year-old female with type 2 diabetes, hypertension, chronic diastolic congestive heart failure, osteoarthritis presenting to office to follow-up blood pressure. Also complains of upper respiratory infection for 5 days. For blood pressure, losartan was reduced to 25 mg about a month ago and patient reports she is tolerating his medication well. Blood pressure has been running consistently in the 699T to 098H systolic over 13B diastolic.  1 episode of blood pressure 100/60 but she denied hypotensive symptoms at that time and overall blood pressures have been improved. -She has continued her Lasix 40 mg daily and has not had any lower extremity swelling. She does note 5 days ago she developed sneezing, nasal congestion and postnasal drip. This has progressed to bilateral maxillary sinus pressure and pain, feels pain in her upper molars as well. Notes postnasal drip, sore throat, cough throughout the day. Cough is predominantly dry, occasional clear to off-white colored sputum. She also is bilateral ear pain, right more than left and hearing does sound slightly muffled. She is been afebrile. No shortness of breath, wheezing, chest pain. She did a COVID test 3 days ago which was negative. For type 2 diabetes, she is reduce metformin to 500 mg twice daily to help with GI side effects and this has improved GI side effects. Reports her fasting glucose in the morning is consistently 100-120. She has not had any readings less than 70. No fasting glucose readings above 130. She continues to make healthy dietary changes and hope to continue to lose weight.     Review of systems negative other than mentioned in HPI    Current Outpatient Medications on File Prior to Visit   Medication Sig Dispense Refill    metFORMIN ER (GLUCOPHAGE XR) 500 mg tablet TAKE 2 TABLETS BY MOUTH TWO (2) TIMES DAILY (WITH MEALS). (Patient taking differently: Take 500 mg by mouth two (2) times a day.) 120 Tablet 1    losartan (COZAAR) 25 mg tablet TAKE 1 TABLET BY MOUTH EVERY DAY 90 Tablet 1    furosemide (LASIX) 40 mg tablet TAKE 1 TABLET BY MOUTH EVERY DAY 30 Tablet 1    acetaminophen (TYLENOL EX STR ARTHRITIS PAIN PO) Take  by mouth. 2 in am and pm for arthritis      mv-mn/folic acid/vit [id number] (ALIVE ONCE DAILY WOMEN 50 PLUS PO) Take  by mouth. atorvastatin (LIPITOR) 40 mg tablet Take 1 Tablet by mouth daily. 90 Tablet 1     No current facility-administered medications on file prior to visit. Patient Active Problem List    Diagnosis Date Noted    Arthritis of left knee 08/10/2022    Heart murmur, systolic 18/10/1075    Lymphadenopathy, inguinal 03/24/2022    History of partial nephrectomy 09/30/2020    Renal cancer, left ([facility] [address] 09/14/2020    Controlled type 2 diabetes mellitus without complication, without long-term current use of insulin ([facility] 06/22/2020    Hyperlipidemia 01/21/2020    Morbid obesity ([facility] [address] 09/24/2019    Chronic diastolic congestive heart failure ([facility] [address] 04/17/2018    Osteoarthritis of knee 04/13/2017    Essential hypertension 04/13/2017    Allergy to penicillin 04/13/2017    S/P vaginal hysterectomy 07/19/2012    Stress incontinence in female 07/12/2012    Pelvic relaxation disorder 07/12/2012     Allergies   Allergen Reactions    Other Food Itching     Requires hypoallergenic sheets when hospitalized (from 2020 in [address]    Shellfish Containing Products Anaphylaxis      Mouth and throat swelling with smoked oysters.  But can tolerate shrimp and crabs      Hydromorphone Other (comments)    Cephalexin Rash     Ancef challenge on 8/10/22    Doxycycline Nausea Only    Hydrocodone-Acetaminophen Itching    Meperidine Itching    Oxycodone-Acetaminophen Hives and Rash    Penicillins Hives and Itching    Propoxyphene Hives and Rash    Propoxyphene N-Acetaminophen Hives and Rash Sulfa (Sulfonamide Antibiotics) Itching    Triamterene-Hydrochlorothiazid Myalgia     Past Surgical History:   Procedure Laterality Date    HX HEART CATHETERIZATION  2020    HX HYSTERECTOMY  2006    approx date    HX KNEE REPLACEMENT Left     HX NEPHRECTOMY  09/09/2020    Robotic assisted laparoscopic left partial nephrectomy    HX OTHER SURGICAL       Social History     Tobacco Use    Smoking status: Never    Smokeless tobacco: Never   Vaping Use    Vaping Use: Never used   Substance Use Topics    Alcohol use: Yes     Comment: once monthly    Drug use: Never     Family History   Problem Relation Age of Onset    Lung Cancer Father     Heart Disease Maternal Grandmother     Heart Disease Mother     Breast Cancer Paternal Aunt      Vitals:    01/12/23 0948   BP: 124/74   Pulse: 72   Resp: 18   Temp: 97.9 °F (36.6 °C)   TempSrc: Oral   SpO2: 99%   Weight: 198 lb (89.8 kg)   Height: 5' 2.5\" (1.588 m)   PainSc:   0 - No pain        Physical Exam  Constitutional:       Appearance: Normal appearance. HENT:      Head: Normocephalic and atraumatic. Right Ear: Ear canal and external ear normal.      Left Ear: Ear canal and external ear normal.      Ears:      Comments: Bilateral Tms are erythematous and bulging     Nose: Congestion present. Right Sinus: Maxillary sinus tenderness and frontal sinus tenderness present. Left Sinus: Maxillary sinus tenderness and frontal sinus tenderness present. Mouth/Throat:      Pharynx: No oropharyngeal exudate or posterior oropharyngeal erythema. Eyes:      Extraocular Movements: Extraocular movements intact. Conjunctiva/sclera: Conjunctivae normal.      Pupils: Pupils are equal, round, and reactive to light. Cardiovascular:      Rate and Rhythm: Normal rate and regular rhythm. Pulses: Normal pulses. Heart sounds: Normal heart sounds. Pulmonary:      Effort: Pulmonary effort is normal.      Breath sounds: Normal breath sounds.    Abdominal: General: Abdomen is flat. Bowel sounds are normal.      Palpations: Abdomen is soft. Musculoskeletal:      Cervical back: Normal range of motion and neck supple. No rigidity or tenderness. Right lower leg: No edema. Left lower leg: No edema. Lymphadenopathy:      Cervical: No cervical adenopathy. Neurological:      Mental Status: She is alert. Psychiatric:         Mood and Affect: Mood normal.         Behavior: Behavior normal.       An electronic signature was used to authenticate this note.   -- Donnie Reza PA-C

## 2023-01-12 NOTE — PROGRESS NOTES
Isa Bhatt is a 57 y.o. female , id x 2(name and ). Reviewed record, history, and  medications. Chief Complaint   Patient presents with    Follow-up    Blood Pressure Check    Sinus Infection     Pt states runny nose, cough, pain and pressure in nasal cavity x 5 days. Vitals:    23 0948   BP: 124/74   Pulse: 72   Resp: 18   Temp: 97.9 °F (36.6 °C)   TempSrc: Oral   SpO2: 99%   Weight: 198 lb (89.8 kg)   Height: 5' 2.5\" (1.588 m)       Coordination of Care Questionnaire:   1. Have you been to the ER, urgent care clinic since your last visit? Hospitalized since your last visit? No    2. Have you seen or consulted any other health care providers outside of the [address] [place] [address] since your last visit? Include any pap smears or colon screening.  No

## 2023-01-24 ENCOUNTER — HOSPITAL ENCOUNTER (OUTPATIENT)
Dept: NUTRITION | Age: 63
Discharge: HOME | End: 2023-01-24
Payer: COMMERCIAL

## 2023-01-24 DIAGNOSIS — E66.01 MORBID OBESITY (HCC): ICD-10-CM

## 2023-01-24 DIAGNOSIS — E11.9 CONTROLLED TYPE 2 DIABETES MELLITUS WITHOUT COMPLICATION, WITHOUT LONG-TERM CURRENT USE OF INSULIN (HCC): Primary | ICD-10-CM

## 2023-01-24 DIAGNOSIS — Z71.3 DIETARY COUNSELING AND SURVEILLANCE: ICD-10-CM

## 2023-01-24 PROCEDURE — 97803 MED NUTRITION INDIV SUBSEQ: CPT | Performed by: DIETITIAN, REGISTERED

## 2023-01-24 NOTE — PROGRESS NOTES
NUTRITION - FOLLOW-UP TREATMENT NOTE  Patient Name: Zully Bernard         Date: 2023  : 1960    YES Patient  Verified  Diagnosis: E11.9 (ICD-10-CM) - Type 2 diabetes mellitus without complications   In time:   830am             Out time:  900am   Total Treatment Time (min):   30     SUBJECTIVE/ASSESSMENT  Current Wt: 192 Previous Wt: 198 Wt Change: -6     Initial Wt: 216 Total Wt change: -24 Height: 62     Changes in medication or medical history? Any new allergies, surgeries or procedures? YES   If yes, update Summary List   Lab Results   Component Value Date/Time    Hemoglobin A1c 6.2 (H) 2022 10:02 AM    Hemoglobin A1c 6.7 (H) 2022 09:49 AM    Hemoglobin A1c 7.4 (H) 02/10/2022 09:25 AM    Hemoglobin A1c, External 7.0 2021 12:00 AM     Lab Results   Component Value Date/Time    Cholesterol, total 104 2022 10:02 AM    HDL Cholesterol 66 2022 10:02 AM    LDL, calculated 24.6 2022 10:02 AM    VLDL, calculated 13.4 2022 10:02 AM    Triglyceride 67 2022 10:02 AM    CHOL/HDL Ratio 1.6 2022 10:02 AM          Nutrition Diagnosis        Diagnosis Status: Obesity R/T excessive energy intake & learned food patterns AEB BMI >30, dietary recall showing high calorie intake from fat, eating past full, and \"clean plate club\". [x]  Improved []  No Change    []  Declined   []  Discontinued     Food and nutrition related knowledge deficit R/T lack of prior education for diabetes and nutrition AEB pt questions during session. [x]  Improved []  No Change    []  Declined   []  Discontinued        Nutrition Monitoring and Evaluation: Pt seen today for follow-up visit. Pt did very well over the holidays to avoid over eating or having sweets. Pt has been keeping meal routine with 2 meals daily.  Trying to incorporate more protein and vegetables with each meal. Pt is finding that she craves more salt in the past few weeks and has been having a small amount of tortilla chips. We discussed adding in an electrolyte drink with zero sugar to see if this helps with salt cravings. Pt is staying active and using her exercise bike almost daily. Pt noted some knee pain but is wanting to hold off on surgery until after [place] in October. Pt is also adding in some light strength training to help with toning areas like arms and legs. SMB mg-dL    Previous goals:  - Continue with using bike at home and stay moving day to day   - Limit sweets and keep blood sugar down. - Maintain weight (1-2 lb weight loss)      Nutrition Prescription and  Intervention Educated pt on the pathophysiology of Type II Diabetes, insulin resistance and the rationale for dietary modifications and increased activity. Educated pt on lean proteins, healthy fats, non-starchy vegetables, and complex carbohydrate food sources. Discussed limiting carbohydrates, label reading, meal timing, and appropriate serving sizes. Encouraged pt to avoid sugary beverages. Reviewed meal builder tool, calorie and macro breakdown as well as exercise parameters. Reviewed energy sources like protein, managing blood sugar levels, and getting in a variety of fruits and vegetables. Reviewed importance of protein with each meal. Answered pt questions about fruit and appropriate serving sizes.       Patient Education:  [x]  Review current plan with patient   []  Other:    Handouts/  Information Provided: []  Carbohydrates  []  Protein  []  Fiber  []  Serving Sizes  []  Fluids  []  General guidelines []  Diabetes  []  Cholesterol  []  Sodium  []  SBGM  []  Food Journals  []  Others:      Patient Goals - Continue with using bike at home and adding in strength exercises  - Have 1 electrolyte drink daily   - Maintain weight (1-2 lb weight loss)      PLAN  [x]  Continue on current plan []  Follow-up PRN   []  Discharge due to :    [x]  Next appt: 4 weeks      Dietitian: Abdirashid Espinoza RDN    Date: 2023 Time: 830 AM

## 2023-02-09 ENCOUNTER — OFFICE VISIT (OUTPATIENT)
Dept: ONCOLOGY | Age: 63
End: 2023-02-09
Payer: COMMERCIAL

## 2023-02-09 VITALS
RESPIRATION RATE: 18 BRPM | TEMPERATURE: 97.9 F | OXYGEN SATURATION: 98 % | SYSTOLIC BLOOD PRESSURE: 136 MMHG | WEIGHT: 200.6 LBS | DIASTOLIC BLOOD PRESSURE: 78 MMHG | HEART RATE: 54 BPM | BODY MASS INDEX: 35.54 KG/M2 | HEIGHT: 63 IN

## 2023-02-09 DIAGNOSIS — C64.2 RENAL CANCER, LEFT (HCC): Primary | ICD-10-CM

## 2023-02-09 DIAGNOSIS — D64.9 NORMOCYTIC ANEMIA: ICD-10-CM

## 2023-02-09 PROCEDURE — 99214 OFFICE O/P EST MOD 30 MIN: CPT | Performed by: INTERNAL MEDICINE

## 2023-02-09 PROCEDURE — 3078F DIAST BP <80 MM HG: CPT | Performed by: INTERNAL MEDICINE

## 2023-02-09 PROCEDURE — 3075F SYST BP GE 130 - 139MM HG: CPT | Performed by: INTERNAL MEDICINE

## 2023-02-09 NOTE — PROGRESS NOTES
[facility] [place] at [address], [address]  Cat Alexandre: [phone]  F: [phone] Patient ID  Name: Larisa You  YOB: 1960  MRN: [id number]  Referring Provider:   No referring provider defined for this encounter. Primary Care Provider:   Ilan Monge       HEMATOLOGY/MEDICAL ONCOLOGY  NOTE   Date of Visit: 02/09/23  Reason for Evaluation:     Chief Complaint   Patient presents with    Follow-up     Hematology/Oncology Summary:  Please review original records for clinical decision making. This summary highlights focused aspects of patient's ongoing care and may have a recurring section in notes with either updates or remain unchanged as a longitudinal care summary. --------------------------------------------------------------------------------------------------------------------------------------------------------------------------------------------------------------------------  DIAGNOSIS:  Left Renal Cell Carcinoma    ORIGINAL STAGING:  Stage I (T1b Nx)    SITES OF DISEASE:  Left Renal Upper Pole    CURRENT TREATMENT:  surveillance    PRIOR TREATMENT:  Left partial nephrectomy-9/2020    GOALS OF CARE:  curative    PATHOLOGY:  No specimen or report number in Dr. Mark Yanes note from 4/20/21:  Left kidney, partial nephrectomy:  Left upper pole  4.1 x 3.5 x 2.7cm (pT1b) Unifocal Clear Cell Renal Cell Carcinoma G2. Tumor limited to kidney, Uninvolved by invasive carcinoma. LVI Not identified. No lymph nodes present for evaluation (pNx). PERTINENT CARE EVENTS  4/5/2021: CT Abd Pelvis without IV contrast:  Partial nephrectomy on left with resection of upper pole partially exophytic renal cell cancer noted previously. Surgical changes in the adjacent perinephric region. No recurrent or residual mass. No evidence of metastatic disease. No evidence of disease progression. Liver hypodensities are likely small benign cysts. Prior hysterectomy. No additional acute abnormality. 5/9/2022: CT ABDOMEN W WO CONT AND PELVIS W CONT FINDINGS: LIVER: A few small low-density lesions are nonspecific but may represent cysts. No biliary ductal dilatation GALLBLADDER: No calcified gallstone SPLEEN: Unremarkable PANCREAS: No mass or ductal dilatation. ADRENALS: Unremarkable. KIDNEYS/URETERS: No hydronephrosis or perinephric inflammation. Postsurgical changes are seen in the left upper pole kidney. No evidence for any abnormal enhancing renal mass. A few small subcentimeter low-density lesions are too small to characterize. No abnormality of the collecting systems or ureters. PERITONEUM: No abdominal lymphadenopathy or ascites. COLON: No dilatation or wall thickening. APPENDIX: Not clearly visualized SMALL BOWEL: No dilatation or wall thickening. STOMACH: Unremarkable. PELVIS: No pelvic lymphadenopathy or free fluid. BONES: No destructive bone lesion. VISUALIZED THORAX: No significant abnormality ADDITIONAL COMMENTS: N/A  Postsurgical changes seen in the upper pole left kidney without evidence for any residual or metastatic disease. Subjective:     History of Present Illness:     Aren Evans is a 57 y.o. F who presents for a follow-up evaluation for a history of renal cell carcinoma. .     Constipation:Grade 1  Fatigue:Grade 1  Insomnia:Grade 2 Swelling:Grade 1  Headache:Grade 1  Urinary Frequency/Urgency:Grade 1  Urinary Leakage/Incontinence:Grade 1  Vaginal Dryness:Grade 1      Patient overall reports feeling stable. She has had a migratory history of abdominal pain. She denies any issues with urinating. She denies any chest pain, shortness of breath, or cough. She notes some fatigue but reports a history of bradycardia; working with her primary care provider to evaluate it. She denies any bleeding issues. Has had some upper abdominal pain.     Past Medical History:   Diagnosis Date    Arthritis     CHF (congestive heart failure) ([facility] [address] Diabetes ([facility] [address]     Hypertension     MSSA (methicillin-susceptible Staphylococcus aureus) colonization 08/05/2022    Nares    Renal cell cancer, left ([address] 2020    has had clear scans since partial nephrectomy in 2020    Second degree uterine prolapse 07/12/2012    Uterine leiomyoma 07/18/2012      Past Surgical History:   Procedure Laterality Date    HX HEART CATHETERIZATION  2020    HX HYSTERECTOMY  2006    approx date    HX KNEE REPLACEMENT Left     HX KNEE REPLACEMENT Left 08/09/2022    HX NEPHRECTOMY  09/09/2020    Robotic assisted laparoscopic left partial nephrectomy    HX OTHER SURGICAL      HX OTHER SURGICAL Left 01/24/2023    Eyelid surgery      Social History     Tobacco Use    Smoking status: Never    Smokeless tobacco: Never   Substance Use Topics    Alcohol use: Yes     Comment: once monthly, occ. Family History   Problem Relation Age of Onset    Lung Cancer Father     Heart Disease Maternal Grandmother     Heart Disease Mother     Breast Cancer Paternal Aunt      Current Outpatient Medications   Medication Sig    metFORMIN ER (GLUCOPHAGE XR) 500 mg tablet Take 1 Tablet by mouth two (2) times a day. furosemide (LASIX) 40 mg tablet TAKE 1 TABLET BY MOUTH EVERY DAY    losartan (COZAAR) 25 mg tablet TAKE 1 TABLET BY MOUTH EVERY DAY    acetaminophen (TYLENOL EX STR ARTHRITIS PAIN PO) Take  by mouth. 2 in am and pm for arthritis    mv-mn/folic acid/vit [id number] (ALIVE ONCE DAILY WOMEN 50 PLUS PO) Take  by mouth. atorvastatin (LIPITOR) 40 mg tablet Take 1 Tablet by mouth daily. No current facility-administered medications for this visit. Allergies   Allergen Reactions    Other Food Itching     Requires hypoallergenic sheets when hospitalized (from 2020 in [address]    Shellfish Containing Products Anaphylaxis      Mouth and throat swelling with smoked oysters.  But can tolerate shrimp and crabs      Hydromorphone Other (comments)    Cephalexin Rash     Ancef challenge on 8/10/22 Doxycycline Nausea Only    Hydrocodone-Acetaminophen Itching    Meperidine Itching    Oxycodone-Acetaminophen Hives and Rash    Penicillins Hives and Itching    Propoxyphene Hives and Rash    Propoxyphene N-Acetaminophen Hives and Rash    Sulfa (Sulfonamide Antibiotics) Itching    Triamterene-Hydrochlorothiazid Myalgia   -  Review of Systems Provided by:  Patient  Review of Systems: A complete review of systems was obtained, reviewed. Pertinent findings reviewed above. Objective:     Visit Vitals  /78 (BP 1 Location: Left upper arm, BP Patient Position: Sitting, BP Cuff Size: Large adult)   Pulse (!) 54   Temp 97.9 °F (36.6 °C) (Oral)   Resp 18   Ht 5' 2.5\" (1.588 m)   Wt 200 lb 9.6 oz (91 kg)   SpO2 98%   BMI 36.11 kg/m²     ECOG PS: 1- Restricted in physically strenuous activity but ambulatory and able to carry out work of a light or sedentary nature, e.g., light house work, office work. Physical Exam  Constitutional: No acute distress. and Non-toxic appearance. HENT: Normocephalic and atraumatic head. Eyes: Normal Conjunctivae. Anicteric sclerae. Cardiovascular: S1,S2 auscultated. No pitting edema. Pulmonary: Normal Respiratory Effort. No wheezing. No rhonchi. No rales. Abdominal: Normal bowel sounds. Soft Abdomen to palpation. No abdominal tenderness. No guarding. No rebound tenderness. Skin: No jaundice. No rash. Musculoskeletal: No muscle pain on palpation. No temporal muscle wasting on inspection. Neurological: Alert and oriented. No tremor on inspection. Normal Gait. Psychiatric: mood normal. normal speech rate. normal affect. Results:   I personally reviewed Epic EHR labs/results below:   Lab Results   Component Value Date/Time    WBC 5.3 08/03/2022 09:49 AM    HGB 8.7 (L) 08/11/2022 02:35 AM    HCT 37.1 08/03/2022 09:49 AM    PLATELET 511 31/90/3497 09:49 AM    MCV 92.5 08/03/2022 09:49 AM    ABS.  NEUTROPHILS 2.8 08/03/2022 09:49 AM     Lab Results   Component Value Date/Time Sodium 140 11/29/2022 10:21 AM    Potassium 3.9 11/29/2022 10:21 AM    Chloride 105 11/29/2022 10:21 AM    CO2 30 11/29/2022 10:21 AM    Glucose 112 (H) 11/29/2022 10:21 AM    BUN 13 11/29/2022 10:21 AM    Creatinine 0.60 11/29/2022 10:21 AM    GFR est AA >60 08/11/2022 02:35 AM    GFR est non-AA >60 08/11/2022 02:35 AM    Calcium 10.0 11/29/2022 10:21 AM    Calcium 10.1 11/29/2022 10:21 AM    Glucose (POC) 110 08/10/2022 06:34 AM    Creatinine (POC) 0.70 05/06/2022 03:53 PM     Lab Results   Component Value Date/Time    Bilirubin, total 0.8 11/29/2022 10:21 AM    ALT (SGPT) 17 11/29/2022 10:21 AM    Alk. phosphatase 72 11/29/2022 10:21 AM    Protein, total 7.0 11/29/2022 10:21 AM    Albumin 3.5 11/29/2022 10:21 AM    Globulin 3.5 11/29/2022 10:21 AM           Assessment and Recommendations:     1. Renal cancer, left ([address]  -will proceed with scans sooner than one year since patient notes some upper abdominal pain. - CT ABD WO CONT; Future  - CT CHEST WO CONT; Future  - METABOLIC PANEL, BASIC; Future  - CBC WITH AUTOMATED DIFF; Future    2. Normocytic anemia  -she had a low hemoglobin last Fall. Will repeat CBC/dfferential today. 3. Bradycardia  -recommend that she proceed forward with calling her cardiologist sooner especially since she is having some symptoms. Follow-up and Dispositions    Return in about 1 year (around 2/9/2024).            Michell Nelson MD  Hematology/Medical Oncology Provider  [facility]  P: [phone]        Signed By:   Bartolo Zavala MD

## 2023-02-09 NOTE — LETTER
2/9/2023    Patient: Isabella Snell   YOB: 1960   Date of Visit: 2/9/2023     Windy Goetz PA-C  [facility] [address]    Dear Windy Goetz PA-C,      Thank you for referring Ms. Kathya Laura to [facility] for evaluation. My notes for this consultation are attached. If you have questions, please do not hesitate to call me. I look forward to following your patient along with you.       Sincerely,    Sada Britton MD

## 2023-02-09 NOTE — PROGRESS NOTES
1. Have you been to the ER, urgent care clinic since your last visit? Hospitalized since your last visit? No    2. Have you seen or consulted any other health care providers outside of the [address] [place] [address] since your last visit? Include any pap smears or colon screening. Yes [facility], total left knee replacement on 8/9/22 & [facility], Eyelid surgery on 1/24/23         Visit Vitals  /78 (BP 1 Location: Left upper arm, BP Patient Position: Sitting, BP Cuff Size: Large adult)   Pulse (!) 54   Temp 97.9 °F (36.6 °C) (Oral)   Resp 18   Ht 5' 2.5\" (1.588 m)   Wt 200 lb 9.6 oz (91 kg)   SpO2 98%   BMI 36.11 kg/m²    Patient denies lightheadedness or dizziness. She reports a headache at 5/10 pain. She reports feeling tired.   Will update MD.          Chief Complaint   Patient presents with    Follow-up

## 2023-02-16 ENCOUNTER — TRANSCRIBE ORDER (OUTPATIENT)
Dept: SCHEDULING | Age: 63
End: 2023-02-16

## 2023-02-16 DIAGNOSIS — Z12.31 SCREENING MAMMOGRAM FOR HIGH-RISK PATIENT: Primary | ICD-10-CM

## 2023-02-24 DIAGNOSIS — I10 ESSENTIAL HYPERTENSION: ICD-10-CM

## 2023-02-24 RX ORDER — LOSARTAN POTASSIUM 25 MG/1
TABLET ORAL
Qty: 90 TABLET | Refills: 1 | Status: SHIPPED | OUTPATIENT
Start: 2023-02-24

## 2023-02-25 ENCOUNTER — HOSPITAL ENCOUNTER (OUTPATIENT)
Dept: CT IMAGING | Age: 63
Discharge: HOME | End: 2023-02-25
Attending: INTERNAL MEDICINE
Payer: COMMERCIAL

## 2023-02-25 ENCOUNTER — HOSPITAL ENCOUNTER (OUTPATIENT)
Dept: CT IMAGING | Age: 63
End: 2023-02-25
Attending: INTERNAL MEDICINE
Payer: COMMERCIAL

## 2023-02-25 DIAGNOSIS — C64.2 RENAL CANCER, LEFT (HCC): ICD-10-CM

## 2023-02-25 PROCEDURE — 74150 CT ABDOMEN W/O CONTRAST: CPT

## 2023-02-25 PROCEDURE — 71250 CT THORAX DX C-: CPT

## 2023-03-15 ENCOUNTER — PATIENT MESSAGE (OUTPATIENT)
Dept: ONCOLOGY | Age: 63
End: 2023-03-15

## 2023-03-27 ENCOUNTER — HOSPITAL ENCOUNTER (OUTPATIENT)
Dept: MAMMOGRAPHY | Age: 63
Discharge: HOME | End: 2023-03-27
Attending: FAMILY MEDICINE
Payer: COMMERCIAL

## 2023-03-27 DIAGNOSIS — Z12.31 SCREENING MAMMOGRAM FOR HIGH-RISK PATIENT: ICD-10-CM

## 2023-03-27 PROCEDURE — 77063 BREAST TOMOSYNTHESIS BI: CPT

## 2023-03-31 ENCOUNTER — APPOINTMENT (OUTPATIENT)
Dept: NUTRITION | Age: 63
End: 2023-03-31

## 2023-04-22 ENCOUNTER — TRANSCRIBE ORDERS (OUTPATIENT)
Facility: HOSPITAL | Age: 63
End: 2023-04-22

## 2023-04-22 DIAGNOSIS — E04.2 MULTINODULAR GOITER: Primary | ICD-10-CM

## 2023-04-22 DIAGNOSIS — M81.0 SENILE OSTEOPOROSIS: ICD-10-CM

## 2023-04-22 DIAGNOSIS — E83.52 HYPERCALCEMIA: ICD-10-CM

## 2023-04-23 DIAGNOSIS — E04.2 MULTINODULAR GOITER: Primary | ICD-10-CM

## 2023-04-23 DIAGNOSIS — E83.52 HYPERCALCEMIA: ICD-10-CM

## 2023-04-23 DIAGNOSIS — M81.0 SENILE OSTEOPOROSIS: ICD-10-CM

## 2023-04-24 DIAGNOSIS — E83.52 HYPERCALCEMIA: ICD-10-CM

## 2023-04-24 DIAGNOSIS — E04.2 MULTINODULAR GOITER: Primary | ICD-10-CM

## 2023-04-24 DIAGNOSIS — M81.0 SENILE OSTEOPOROSIS: ICD-10-CM

## 2023-05-22 ENCOUNTER — TELEPHONE (OUTPATIENT)
Age: 63
End: 2023-05-22

## 2023-05-22 NOTE — TELEPHONE ENCOUNTER
We received a fax refill request for Jelani Feldman. Please escribe Atorvastatin to their pharmacy. The pharmacy is correct in the chart and they are requesting a 90 day supply.   Refills: 1

## 2023-05-23 DIAGNOSIS — E11.9 CONTROLLED TYPE 2 DIABETES MELLITUS WITHOUT COMPLICATION, WITHOUT LONG-TERM CURRENT USE OF INSULIN (HCC): Primary | ICD-10-CM

## 2023-05-23 RX ORDER — METFORMIN HYDROCHLORIDE 500 MG/1
1000 TABLET, EXTENDED RELEASE ORAL 2 TIMES DAILY
Qty: 360 TABLET | Refills: 1 | Status: SHIPPED | OUTPATIENT
Start: 2023-05-23

## 2023-05-23 RX ORDER — ATORVASTATIN CALCIUM 40 MG/1
40 TABLET, FILM COATED ORAL DAILY
Qty: 90 TABLET | Refills: 0 | Status: SHIPPED | OUTPATIENT
Start: 2023-05-23

## 2023-06-01 ENCOUNTER — OFFICE VISIT (OUTPATIENT)
Age: 63
End: 2023-06-01
Payer: COMMERCIAL

## 2023-06-01 VITALS
OXYGEN SATURATION: 99 % | WEIGHT: 206 LBS | HEIGHT: 63 IN | RESPIRATION RATE: 16 BRPM | BODY MASS INDEX: 36.5 KG/M2 | TEMPERATURE: 97.9 F | DIASTOLIC BLOOD PRESSURE: 63 MMHG | SYSTOLIC BLOOD PRESSURE: 123 MMHG | HEART RATE: 60 BPM

## 2023-06-01 DIAGNOSIS — M75.81 TENDINITIS OF RIGHT ROTATOR CUFF: ICD-10-CM

## 2023-06-01 DIAGNOSIS — E78.2 MIXED HYPERLIPIDEMIA: ICD-10-CM

## 2023-06-01 DIAGNOSIS — E11.9 CONTROLLED TYPE 2 DIABETES MELLITUS WITHOUT COMPLICATION, WITHOUT LONG-TERM CURRENT USE OF INSULIN (HCC): Primary | ICD-10-CM

## 2023-06-01 DIAGNOSIS — Z11.4 ENCOUNTER FOR SCREENING FOR HIV: ICD-10-CM

## 2023-06-01 DIAGNOSIS — I10 ESSENTIAL HYPERTENSION: ICD-10-CM

## 2023-06-01 DIAGNOSIS — Z11.59 ENCOUNTER FOR HEPATITIS C SCREENING TEST FOR LOW RISK PATIENT: ICD-10-CM

## 2023-06-01 LAB
ALBUMIN SERPL-MCNC: 3.9 G/DL (ref 3.5–5)
ALBUMIN/GLOB SERPL: 1.1 (ref 1.1–2.2)
ALP SERPL-CCNC: 71 U/L (ref 45–117)
ALT SERPL-CCNC: 25 U/L (ref 12–78)
ANION GAP SERPL CALC-SCNC: 8 MMOL/L (ref 5–15)
AST SERPL-CCNC: 28 U/L (ref 15–37)
BILIRUB SERPL-MCNC: 1 MG/DL (ref 0.2–1)
BUN SERPL-MCNC: 19 MG/DL (ref 6–20)
BUN/CREAT SERPL: 26 (ref 12–20)
CALCIUM SERPL-MCNC: 11.2 MG/DL (ref 8.5–10.1)
CHLORIDE SERPL-SCNC: 104 MMOL/L (ref 97–108)
CHOLEST SERPL-MCNC: 135 MG/DL
CO2 SERPL-SCNC: 27 MMOL/L (ref 21–32)
CREAT SERPL-MCNC: 0.73 MG/DL (ref 0.55–1.02)
GLOBULIN SER CALC-MCNC: 3.6 G/DL (ref 2–4)
GLUCOSE SERPL-MCNC: 112 MG/DL (ref 65–100)
HDLC SERPL-MCNC: 83 MG/DL
HDLC SERPL: 1.6 (ref 0–5)
LDLC SERPL CALC-MCNC: 39.2 MG/DL (ref 0–100)
POTASSIUM SERPL-SCNC: 4.2 MMOL/L (ref 3.5–5.1)
PROT SERPL-MCNC: 7.5 G/DL (ref 6.4–8.2)
SODIUM SERPL-SCNC: 139 MMOL/L (ref 136–145)
TRIGL SERPL-MCNC: 64 MG/DL
VLDLC SERPL CALC-MCNC: 12.8 MG/DL

## 2023-06-01 PROCEDURE — 99214 OFFICE O/P EST MOD 30 MIN: CPT | Performed by: FAMILY MEDICINE

## 2023-06-01 PROCEDURE — 3078F DIAST BP <80 MM HG: CPT | Performed by: FAMILY MEDICINE

## 2023-06-01 PROCEDURE — 3074F SYST BP LT 130 MM HG: CPT | Performed by: FAMILY MEDICINE

## 2023-06-01 RX ORDER — ACETAMINOPHEN 500 MG
TABLET ORAL
COMMUNITY
End: 2023-06-01

## 2023-06-01 RX ORDER — ALBUTEROL SULFATE 90 UG/1
AEROSOL, METERED RESPIRATORY (INHALATION)
COMMUNITY

## 2023-06-01 RX ORDER — LORATADINE 10 MG/1
10 TABLET ORAL DAILY
COMMUNITY

## 2023-06-01 SDOH — ECONOMIC STABILITY: HOUSING INSECURITY
IN THE LAST 12 MONTHS, WAS THERE A TIME WHEN YOU DID NOT HAVE A STEADY PLACE TO SLEEP OR SLEPT IN A SHELTER (INCLUDING NOW)?: NO

## 2023-06-01 SDOH — ECONOMIC STABILITY: FOOD INSECURITY: WITHIN THE PAST 12 MONTHS, YOU WORRIED THAT YOUR FOOD WOULD RUN OUT BEFORE YOU GOT MONEY TO BUY MORE.: NEVER TRUE

## 2023-06-01 SDOH — ECONOMIC STABILITY: INCOME INSECURITY: HOW HARD IS IT FOR YOU TO PAY FOR THE VERY BASICS LIKE FOOD, HOUSING, MEDICAL CARE, AND HEATING?: NOT HARD AT ALL

## 2023-06-01 SDOH — ECONOMIC STABILITY: FOOD INSECURITY: WITHIN THE PAST 12 MONTHS, THE FOOD YOU BOUGHT JUST DIDN'T LAST AND YOU DIDN'T HAVE MONEY TO GET MORE.: NEVER TRUE

## 2023-06-01 ASSESSMENT — PATIENT HEALTH QUESTIONNAIRE - PHQ9
2. FEELING DOWN, DEPRESSED OR HOPELESS: 0
SUM OF ALL RESPONSES TO PHQ QUESTIONS 1-9: 0
1. LITTLE INTEREST OR PLEASURE IN DOING THINGS: 0
SUM OF ALL RESPONSES TO PHQ9 QUESTIONS 1 & 2: 0

## 2023-06-01 NOTE — PROGRESS NOTES
Progress Note    she is a 57y.o. year old female who presents for evaluation. Subjective:     Pt had requested increase in metformin which we did and states she has gained some weight and has been under stress with 's medical issues. She is trying to get back on track and last A1C was doing well at 6.2 down from 6.7. Reports UTD on diabetic eye exam few months prior. [facility]  Ldl at goal on statin. BP remains well controlled with medication  R shoulder reason has been bothering her, no trauma  Reviewed PmHx, RxHx, FmHx, SocHx, AllgHx and updated and dated in the chart. Review of Systems - negative except as listed above in the HPI    Objective:     Vitals:    06/01/23 0929   BP: 123/63   Site: Left Upper Arm   Position: Sitting   Pulse: 60   Resp: 16   Temp: 97.9 °F (36.6 °C)   TempSrc: Oral   SpO2: 99%   Weight: 206 lb (93.4 kg)   Height: 5' 2.5\" (1.588 m)       Current Outpatient Medications   Medication Sig    Multiple Vitamin (MULTI-VITAMIN DAILY PO) Take by mouth    albuterol sulfate HFA (PROVENTIL;VENTOLIN;PROAIR) 108 (90 Base) MCG/ACT inhaler Ventolin HFA 90 mcg/actuation aerosol inhaler    loratadine (CLARITIN) 10 MG tablet Take 1 tablet by mouth daily    atorvastatin (LIPITOR) 40 MG tablet Take 1 tablet by mouth daily    metFORMIN (GLUCOPHAGE-XR) 500 MG extended release tablet Take 2 tablets by mouth 2 times daily    furosemide (LASIX) 40 MG tablet Take 1 tablet by mouth daily    losartan (COZAAR) 25 MG tablet Take 1 tablet by mouth daily     No current facility-administered medications for this visit. Physical Examination: General appearance - alert, well appearing, and in no distress  Chest - clear to auscultation, no wheezes, rales or rhonchi, symmetric air entry  Heart - normal rate, regular rhythm, normal S1, S2, no murmurs, rubs, clicks or gallops  Musculoskeletal - Pain with resisted abduction R arm, able to raise over head with some pain.         Assessment/ Plan:

## 2023-06-01 NOTE — PATIENT INSTRUCTIONS
adapted under license by [facility] ([facility]). If you have questions about a medical condition or this instruction, always ask your healthcare professional. [address] any warranty or liability for your use of this information.

## 2023-06-01 NOTE — PROGRESS NOTES
Chief Complaint   Patient presents with    Diabetes     Patient presents in office today for diabetes check. Has c/o right shoulder pain for about a month. Treating with Tylenol with no relief. States that she tried one of her moms muscle relaxers and once that wore off it was worse. No other concerns. 1. \"Have you been to the ER, urgent care clinic since your last visit? Hospitalized since your last visit? \" No    2. \"Have you seen or consulted any other health care providers outside of the [address] [place] [address] since your last visit? \" No     3. For patients aged 39-70: Has the patient had a colonoscopy / FIT/ Cologuard? Yes - no Care Gap present      If the patient is female:    4. For patients aged 41-77: Has the patient had a mammogram within the past 2 years? Yes - no Care Gap present      5. For patients aged 21-65: Has the patient had a pap smear?  No

## 2023-06-02 LAB
EST. AVERAGE GLUCOSE BLD GHB EST-MCNC: 143 MG/DL
HBA1C MFR BLD: 6.6 % (ref 4–5.6)
HCV AB SERPL QL IA: NONREACTIVE
HIV 1+2 AB+HIV1 P24 AG SERPL QL IA: NONREACTIVE
HIV 1/2 RESULT COMMENT: NORMAL

## 2023-06-06 DIAGNOSIS — E83.52 HYPERCALCEMIA: Primary | ICD-10-CM

## 2023-06-07 ENCOUNTER — TELEPHONE (OUTPATIENT)
Age: 63
End: 2023-06-07

## 2023-06-07 NOTE — TELEPHONE ENCOUNTER
Pt called in and asked about picking up her lab order, did print it off and met pt to give to her in hallway. No further action needed at this time.

## 2023-06-19 ENCOUNTER — TELEPHONE (OUTPATIENT)
Age: 63
End: 2023-06-19

## 2023-06-19 NOTE — TELEPHONE ENCOUNTER
Called pt to inform her that we are currently out of 24 hour urine containers and she will receive a call when we have them in stock. Pt gave a verbal understanding with no further concerns at this time.

## 2023-06-23 ENCOUNTER — HOSPITAL ENCOUNTER (OUTPATIENT)
Facility: HOSPITAL | Age: 63
Setting detail: RECURRING SERIES
Discharge: HOME | End: 2023-06-26
Payer: COMMERCIAL

## 2023-06-23 PROCEDURE — 97803 MED NUTRITION INDIV SUBSEQ: CPT

## 2023-07-10 ENCOUNTER — TELEPHONE (OUTPATIENT)
Age: 63
End: 2023-07-10

## 2023-07-10 NOTE — TELEPHONE ENCOUNTER
We received a fax refill request for Mabelene Given. Please escribe Furosemide to their pharmacy. The pharmacy is correct in the chart and they are requesting a 90 day supply.   Refill: 1

## 2023-07-11 RX ORDER — FUROSEMIDE 40 MG/1
40 TABLET ORAL DAILY
Qty: 90 TABLET | Refills: 1 | Status: SHIPPED | OUTPATIENT
Start: 2023-07-11

## 2023-07-21 ENCOUNTER — HOSPITAL ENCOUNTER (OUTPATIENT)
Facility: HOSPITAL | Age: 63
Setting detail: RECURRING SERIES
Discharge: HOME | End: 2023-07-24
Payer: COMMERCIAL

## 2023-07-21 PROCEDURE — 97803 MED NUTRITION INDIV SUBSEQ: CPT

## 2023-07-21 NOTE — PROGRESS NOTES
NUTRITION - FOLLOW-UP TREATMENT NOTE  Patient Name: Adele Sims         Date: 2023  : 1960    YES Patient  Verified  Diagnosis: E11.9 (ICD-10-CM) - Type 2 diabetes mellitus without complications   In time:   1030am             Out time:   1100am   Total Treatment Time (min):   30     SUBJECTIVE/ASSESSMENT  Current Wt: 203 Previous Wt: 202.6 Wt Change: +0.4     Initial Wt: 216 Total Wt change: -13 Height: 62     Changes in medication or medical history? Any new allergies, surgeries or procedures? YES/NO    If yes, update Summary List        Nutrition Diagnosis        Diagnosis Status: Obesity R/T excessive energy intake & learned food patterns AEB BMI >30, dietary recall showing high calorie intake from fat, eating past full, and \"clean plate club\". []  Improved []  No Change    [x]  Declined   []  Discontinued     Food and nutrition related knowledge deficit R/T lack of prior education for diabetes and nutrition AEB pt questions during session. [x]  Improved []  No Change    []  Declined   []  Discontinued        Nutrition Monitoring and Evaluation: Pt seen today for follow-up visit. Pt celebrated birthday last weekend with family. Pt noted that things have been fairly consistent in the past month. Pt is finding that she is skipping meals a bit more frequently, but trying to remember to eat a small protein snack to maintain blood sugar level. Pt has been using less peanut butter at night for snacking and when having sweets is doing small portions. Pt has been cooking breakfast at home and this has been helping to keep calories down compared to eating out. Pt had difficulty sleeping in the past couple nights with pain in shoulder and hip. Pt has PT referral but has not called to setup appt yet. Pt is caring for  and often puts her own needs second. Pt blood sugar is improving, pt reports range 117-123mg/dL.      Previous Goals:   -  Add protein with middle of the day meal   - Avoid

## 2023-08-10 ENCOUNTER — OFFICE VISIT (OUTPATIENT)
Age: 63
End: 2023-08-10
Payer: COMMERCIAL

## 2023-08-10 VITALS
HEART RATE: 54 BPM | SYSTOLIC BLOOD PRESSURE: 150 MMHG | WEIGHT: 203 LBS | BODY MASS INDEX: 35.97 KG/M2 | DIASTOLIC BLOOD PRESSURE: 86 MMHG | HEIGHT: 63 IN

## 2023-08-10 DIAGNOSIS — I50.32 CHRONIC DIASTOLIC CONGESTIVE HEART FAILURE (HCC): Primary | ICD-10-CM

## 2023-08-10 DIAGNOSIS — I10 ESSENTIAL HYPERTENSION: ICD-10-CM

## 2023-08-10 PROCEDURE — 93000 ELECTROCARDIOGRAM COMPLETE: CPT | Performed by: SPECIALIST

## 2023-08-10 PROCEDURE — 3074F SYST BP LT 130 MM HG: CPT | Performed by: SPECIALIST

## 2023-08-10 PROCEDURE — 99214 OFFICE O/P EST MOD 30 MIN: CPT | Performed by: SPECIALIST

## 2023-08-10 PROCEDURE — 3078F DIAST BP <80 MM HG: CPT | Performed by: SPECIALIST

## 2023-08-10 PROCEDURE — 1123F ACP DISCUSS/DSCN MKR DOCD: CPT | Performed by: SPECIALIST

## 2023-08-10 RX ORDER — SENNOSIDES 8.6 MG
650 CAPSULE ORAL EVERY 8 HOURS PRN
COMMUNITY

## 2023-08-10 NOTE — PROGRESS NOTES
Bethany Bentley MD. [facility]          Patient: Evelyne Ronquillo  : 1960      Today's Date: 8/10/2023        HISTORY OF PRESENT ILLNESS:     History of Present Illness:    She is doing well. No cardiac complaints. Breathing well.         PAST MEDICAL HISTORY:     Past Medical History:   Diagnosis Date    Arthritis     CHF (congestive heart failure) ([address])     Diabetes ([address])     Hypertension     MSSA (methicillin-susceptible Staphylococcus aureus) colonization 2022    Nares    Renal cell cancer, left ([address])     has had clear scans since partial nephrectomy in     Second degree uterine prolapse 2012    Uterine leiomyoma 2012       Past Surgical History:   Procedure Laterality Date    CARDIAC CATHETERIZATION      HYSTERECTOMY (CERVIX STATUS UNKNOWN)      approx date    KIDNEY REMOVAL  2020    Robotic assisted laparoscopic left partial nephrectomy    OTHER SURGICAL HISTORY Left 2023    Eyelid surgery    OTHER SURGICAL HISTORY      TOTAL KNEE ARTHROPLASTY Left 2022    TOTAL KNEE ARTHROPLASTY Left              CURRENT MEDICATIONS:    .  Current Outpatient Medications   Medication Sig Dispense Refill    Multiple Vitamins-Minerals (HAIR SKIN AND NAILS FORMULA PO) Take by mouth      acetaminophen (TYLENOL 8 HOUR ARTHRITIS PAIN) 650 MG extended release tablet Take 1 tablet by mouth every 8 hours as needed for Pain TWO TABS MORNING, TWO TABS AT NIGHT      furosemide (LASIX) 40 MG tablet Take 1 tablet by mouth daily 90 tablet 1    Multiple Vitamin (MULTI-VITAMIN DAILY PO) Take by mouth      atorvastatin (LIPITOR) 40 MG tablet Take 1 tablet by mouth daily 90 tablet 0    metFORMIN (GLUCOPHAGE-XR) 500 MG extended release tablet Take 2 tablets by mouth 2 times daily (Patient taking differently: Take 2 tablets by mouth Daily) 360 tablet 1    losartan (COZAAR) 25 MG tablet Take 1 tablet by mouth daily      albuterol sulfate HFA (PROVENTIL;VENTOLIN;PROAIR) 108 (90 Base) MCG/ACT

## 2023-08-15 RX ORDER — ATORVASTATIN CALCIUM 40 MG/1
TABLET, FILM COATED ORAL
Qty: 90 TABLET | Refills: 3 | Status: SHIPPED | OUTPATIENT
Start: 2023-08-15

## 2023-08-23 ENCOUNTER — NURSE TRIAGE (OUTPATIENT)
Dept: OTHER | Facility: CLINIC | Age: 63
End: 2023-08-23

## 2023-08-23 NOTE — TELEPHONE ENCOUNTER
[facility]    Received call from [facility] with [email or web address]. Subjective: Caller states \"last night BP was running low, 110's/50's and felt dizzy\"     Current Symptoms: dizziness with low BP in evenings  -dizziness resolves shortly with activity  -denies falls    Onset: 3 days ago; unchanged    Associated Symptoms:  anxiety    Pain Severity: 0/10; Temperature: denies    What has been tried: prescribed losartan,  diuretic        Recommended disposition: See PCP within 3 Days    Care advice provided, patient verbalizes understanding; denies any other questions or concerns; instructed to call back for any new or worsening symptoms. Patient/Caller agrees with recommended disposition; writer provided warm transfer to [facility] for appointment scheduling    Attention Provider: Thank you for allowing me to participate in the care of your patient. The patient was connected to triage in response to information provided to the ECC/PSC. Please do not respond through this encounter as the response is not directed to a shared pool.       Reason for Disposition   Brief dizziness or lightheadedness after standing up or eating    Protocols used: Blood Pressure - Low-ADULT-OH

## 2023-08-25 ENCOUNTER — OFFICE VISIT (OUTPATIENT)
Age: 63
End: 2023-08-25
Payer: COMMERCIAL

## 2023-08-25 VITALS
WEIGHT: 203 LBS | OXYGEN SATURATION: 98 % | HEIGHT: 63 IN | BODY MASS INDEX: 35.97 KG/M2 | HEART RATE: 60 BPM | SYSTOLIC BLOOD PRESSURE: 143 MMHG | RESPIRATION RATE: 20 BRPM | DIASTOLIC BLOOD PRESSURE: 83 MMHG

## 2023-08-25 DIAGNOSIS — R42 DIZZINESS: ICD-10-CM

## 2023-08-25 DIAGNOSIS — I10 ESSENTIAL HYPERTENSION: Primary | ICD-10-CM

## 2023-08-25 DIAGNOSIS — I50.32 CHRONIC HEART FAILURE WITH PRESERVED EJECTION FRACTION (HCC): ICD-10-CM

## 2023-08-25 PROCEDURE — 1123F ACP DISCUSS/DSCN MKR DOCD: CPT | Performed by: NURSE PRACTITIONER

## 2023-08-25 PROCEDURE — 99214 OFFICE O/P EST MOD 30 MIN: CPT | Performed by: NURSE PRACTITIONER

## 2023-08-25 PROCEDURE — 3078F DIAST BP <80 MM HG: CPT | Performed by: NURSE PRACTITIONER

## 2023-08-25 PROCEDURE — 3074F SYST BP LT 130 MM HG: CPT | Performed by: NURSE PRACTITIONER

## 2023-08-25 NOTE — PROGRESS NOTES
Chief Complaint   Patient presents with    Blood Pressure Check     Pt reports low BP readings and dizziness that started ~1 week ago. Pt states that she experiences episodes when laying down and can feel it. Has to get up and move around prior to seeing improvement. BP values and HR log provided by pt. Reports nausea, headache, fatigue & dizziness noted when experiencing sxs.       Vitals:    08/25/23 0941   BP: 133/76   Site: Right Upper Arm   Position: Sitting   Cuff Size: Large Adult   Pulse: 63   Resp: 20   SpO2: 98%   Weight: 203 lb (92.1 kg)   Height: 5' 2.5\" (1.588 m)

## 2023-08-26 LAB
ALBUMIN SERPL-MCNC: 3.8 G/DL (ref 3.5–5)
ALBUMIN/GLOB SERPL: 1.1 (ref 1.1–2.2)
ALP SERPL-CCNC: 77 U/L (ref 45–117)
ALT SERPL-CCNC: 21 U/L (ref 12–78)
ANION GAP SERPL CALC-SCNC: 4 MMOL/L (ref 5–15)
AST SERPL-CCNC: 20 U/L (ref 15–37)
BILIRUB SERPL-MCNC: 0.7 MG/DL (ref 0.2–1)
BUN SERPL-MCNC: 17 MG/DL (ref 6–20)
BUN/CREAT SERPL: 23 (ref 12–20)
CALCIUM SERPL-MCNC: 10.7 MG/DL (ref 8.5–10.1)
CHLORIDE SERPL-SCNC: 106 MMOL/L (ref 97–108)
CO2 SERPL-SCNC: 28 MMOL/L (ref 21–32)
CREAT SERPL-MCNC: 0.75 MG/DL (ref 0.55–1.02)
ERYTHROCYTE [DISTWIDTH] IN BLOOD BY AUTOMATED COUNT: 12.3 % (ref 11.5–14.5)
GLOBULIN SER CALC-MCNC: 3.5 G/DL (ref 2–4)
GLUCOSE SERPL-MCNC: 113 MG/DL (ref 65–100)
HCT VFR BLD AUTO: 37.7 % (ref 35–47)
HGB BLD-MCNC: 12 G/DL (ref 11.5–16)
MCH RBC QN AUTO: 29.8 PG (ref 26–34)
MCHC RBC AUTO-ENTMCNC: 31.8 G/DL (ref 30–36.5)
MCV RBC AUTO: 93.5 FL (ref 80–99)
NRBC # BLD: 0 K/UL (ref 0–0.01)
NRBC BLD-RTO: 0 PER 100 WBC
NT PRO BNP: 30 PG/ML
PLATELET # BLD AUTO: 276 K/UL (ref 150–400)
PMV BLD AUTO: 10.2 FL (ref 8.9–12.9)
POTASSIUM SERPL-SCNC: 4.5 MMOL/L (ref 3.5–5.1)
PROT SERPL-MCNC: 7.3 G/DL (ref 6.4–8.2)
RBC # BLD AUTO: 4.03 M/UL (ref 3.8–5.2)
SODIUM SERPL-SCNC: 138 MMOL/L (ref 136–145)
TSH SERPL DL<=0.05 MIU/L-ACNC: 2.27 UIU/ML (ref 0.36–3.74)
WBC # BLD AUTO: 5.5 K/UL (ref 3.6–11)

## 2023-08-28 ENCOUNTER — TELEPHONE (OUTPATIENT)
Age: 63
End: 2023-08-28

## 2023-08-28 RX ORDER — LOSARTAN POTASSIUM 25 MG/1
25 TABLET ORAL DAILY
Qty: 90 TABLET | Refills: 1 | Status: SHIPPED | OUTPATIENT
Start: 2023-08-28

## 2023-08-28 NOTE — TELEPHONE ENCOUNTER
We received a fax refill request for Mabelene Given. Please escribe Losartan Potassium to their pharmacy. The pharmacy is correct in the chart and they are requesting a 90 day supply.   Refills: 1

## 2023-10-06 ENCOUNTER — OFFICE VISIT (OUTPATIENT)
Age: 63
End: 2023-10-06
Payer: MEDICARE

## 2023-10-06 VITALS
TEMPERATURE: 97.5 F | BODY MASS INDEX: 35.97 KG/M2 | RESPIRATION RATE: 16 BRPM | SYSTOLIC BLOOD PRESSURE: 127 MMHG | DIASTOLIC BLOOD PRESSURE: 77 MMHG | OXYGEN SATURATION: 96 % | HEART RATE: 55 BPM | HEIGHT: 63 IN | WEIGHT: 203 LBS

## 2023-10-06 DIAGNOSIS — Z00.00 WELCOME TO MEDICARE PREVENTIVE VISIT: Primary | ICD-10-CM

## 2023-10-06 DIAGNOSIS — E66.01 SEVERE OBESITY (BMI 35.0-39.9) WITH COMORBIDITY (HCC): ICD-10-CM

## 2023-10-06 DIAGNOSIS — G47.00 INSOMNIA, UNSPECIFIED TYPE: ICD-10-CM

## 2023-10-06 DIAGNOSIS — E11.9 CONTROLLED TYPE 2 DIABETES MELLITUS WITHOUT COMPLICATION, WITHOUT LONG-TERM CURRENT USE OF INSULIN (HCC): ICD-10-CM

## 2023-10-06 LAB
CREAT UR-MCNC: 34.1 MG/DL
HBA1C MFR BLD: 6.3 %
MICROALBUMIN UR-MCNC: <0.5 MG/DL
MICROALBUMIN/CREAT UR-RTO: <15 MG/G (ref 0–30)

## 2023-10-06 PROCEDURE — 83036 HEMOGLOBIN GLYCOSYLATED A1C: CPT | Performed by: FAMILY MEDICINE

## 2023-10-06 PROCEDURE — 99214 OFFICE O/P EST MOD 30 MIN: CPT | Performed by: FAMILY MEDICINE

## 2023-10-06 RX ORDER — TRAZODONE HYDROCHLORIDE 50 MG/1
TABLET ORAL
Qty: 180 TABLET | Refills: 1 | Status: SHIPPED | OUTPATIENT
Start: 2023-10-06

## 2023-10-06 RX ORDER — ALBUTEROL SULFATE 90 UG/1
AEROSOL, METERED RESPIRATORY (INHALATION)
Qty: 18 G | Status: CANCELLED | OUTPATIENT
Start: 2023-10-06

## 2023-10-06 ASSESSMENT — PATIENT HEALTH QUESTIONNAIRE - PHQ9
SUM OF ALL RESPONSES TO PHQ QUESTIONS 1-9: 0
1. LITTLE INTEREST OR PLEASURE IN DOING THINGS: 0
SUM OF ALL RESPONSES TO PHQ9 QUESTIONS 1 & 2: 0
SUM OF ALL RESPONSES TO PHQ QUESTIONS 1-9: 0
2. FEELING DOWN, DEPRESSED OR HOPELESS: 0

## 2023-10-06 ASSESSMENT — LIFESTYLE VARIABLES
HOW MANY STANDARD DRINKS CONTAINING ALCOHOL DO YOU HAVE ON A TYPICAL DAY: 1 OR 2
HOW OFTEN DO YOU HAVE A DRINK CONTAINING ALCOHOL: MONTHLY OR LESS

## 2023-10-06 NOTE — PROGRESS NOTES
Chief Complaint   Patient presents with    Medicare AWV     Patient presents in office today for AMW visit. Has c/o left leg swelling. No concerns. 1. \"Have you been to the ER, urgent care clinic since your last visit? Hospitalized since your last visit? \" No    2. \"Have you seen or consulted any other health care providers outside of the [address] [place] [address] since your last visit? \" No     3. For patients aged 43-73: Has the patient had a colonoscopy / FIT/ Cologuard? Yes - no Care Gap present      If the patient is female:    4. For patients aged 43-66: Has the patient had a mammogram within the past 2 years? Yes - no Care Gap present      5. For patients aged 21-65: Has the patient had a pap smear?  No
TABLET BY MOUTH EVERY DAY Yes Rajesh Fisher DO   acetaminophen (TYLENOL 8 HOUR ARTHRITIS PAIN) 650 MG extended release tablet Take 1 tablet by mouth every 8 hours as needed for Pain TWO TABS MORNING, TWO TABS AT NIGHT Yes Provider, MD Vitor   furosemide (LASIX) 40 MG tablet Take 1 tablet by mouth daily Yes Rajesh Fisher DO   Multiple Vitamin (MULTI-VITAMIN DAILY PO) Take by mouth Yes Provider, MD Vitor   albuterol sulfate HFA (PROVENTIL;VENTOLIN;PROAIR) 108 (90 Base) MCG/ACT inhaler  Yes Provider, MD Vitor   metFORMIN (GLUCOPHAGE-XR) 500 MG extended release tablet Take 2 tablets by mouth 2 times daily  Patient not taking: Reported on 10/6/2023  Patrick Fisher DO CareTeam (Including outside providers/suppliers regularly involved in providing care):   Patient Care Team:  Mc Leon DO as PCP - General (Family Medicine)  Mc Leon DO as PCP - Empaneled Provider  Lisa Christopher MD as Consulting Physician  Kenya Parsons MD as Consulting Physician     Reviewed and updated this visit:  Tobacco  Allergies  Meds  Problems  Med Hx  Surg Hx  Soc Hx  Fam Hx

## 2023-10-23 ENCOUNTER — TELEPHONE (OUTPATIENT)
Age: 63
End: 2023-10-23

## 2023-10-23 NOTE — TELEPHONE ENCOUNTER
Sherwin Chau at [facility]  [phone]    10/23/23 12:48 PM EDT -  Please call patient and let her know that we would prefer her start with her PCP for back pain evaluation. On her most recent CT scan in February, it notes that she has some osteoarthritis of her lower spine, which could be the root cause of this back pain she is experiencing, however, we would like for her to reach out to PCP for evaluation and management.    Mazin Lala, NP

## 2023-10-23 NOTE — TELEPHONE ENCOUNTER
10/23/23 @12:57PM - Patient ID Verified x2 , Patient was contacted by this user to inform patient of Dr. Sumaya Dorantes's Message! Patient stated a understanding and had no further questions or concerns.

## 2023-10-23 NOTE — TELEPHONE ENCOUNTER
PT reports she has been experiencing some lower back pain and wondering if she needs to be seen sooner than her appointment on 2-9-24.     CB# [phone]

## 2023-10-24 ENCOUNTER — HOSPITAL ENCOUNTER (OUTPATIENT)
Facility: HOSPITAL | Age: 63
Discharge: HOME | End: 2023-10-27
Payer: MEDICARE

## 2023-10-24 ENCOUNTER — OFFICE VISIT (OUTPATIENT)
Age: 63
End: 2023-10-24
Payer: MEDICARE

## 2023-10-24 VITALS
OXYGEN SATURATION: 100 % | TEMPERATURE: 98.2 F | WEIGHT: 202.8 LBS | HEIGHT: 63 IN | HEART RATE: 65 BPM | SYSTOLIC BLOOD PRESSURE: 128 MMHG | BODY MASS INDEX: 35.93 KG/M2 | RESPIRATION RATE: 20 BRPM | DIASTOLIC BLOOD PRESSURE: 76 MMHG

## 2023-10-24 DIAGNOSIS — M54.50 RIGHT LUMBAR PAIN: ICD-10-CM

## 2023-10-24 DIAGNOSIS — F33.42 RECURRENT MAJOR DEPRESSIVE DISORDER, IN FULL REMISSION (HCC): ICD-10-CM

## 2023-10-24 DIAGNOSIS — R10.9 RIGHT FLANK PAIN: ICD-10-CM

## 2023-10-24 DIAGNOSIS — M54.50 RIGHT LUMBAR PAIN: Primary | ICD-10-CM

## 2023-10-24 PROBLEM — F33.0 MAJOR DEPRESSIVE DISORDER, RECURRENT, MILD (HCC): Status: ACTIVE | Noted: 2023-10-24

## 2023-10-24 PROBLEM — F33.9 MAJOR DEPRESSIVE DISORDER, RECURRENT, UNSPECIFIED (HCC): Status: ACTIVE | Noted: 2023-10-24

## 2023-10-24 PROBLEM — F33.1 MAJOR DEPRESSIVE DISORDER, RECURRENT, MODERATE (HCC): Status: ACTIVE | Noted: 2023-10-24

## 2023-10-24 LAB
BILIRUBIN, URINE, POC: NEGATIVE
BLOOD URINE, POC: NEGATIVE
GLUCOSE URINE, POC: NEGATIVE
KETONES, URINE, POC: NEGATIVE
LEUKOCYTE ESTERASE, URINE, POC: NEGATIVE
NITRITE, URINE, POC: NEGATIVE
PH, URINE, POC: 6 (ref 4.6–8)
PROTEIN,URINE, POC: NEGATIVE
SPECIFIC GRAVITY, URINE, POC: 1.01 (ref 1–1.03)
URINALYSIS CLARITY, POC: CLEAR
URINALYSIS COLOR, POC: YELLOW
UROBILINOGEN, POC: NORMAL

## 2023-10-24 PROCEDURE — 3017F COLORECTAL CA SCREEN DOC REV: CPT | Performed by: PHYSICIAN ASSISTANT

## 2023-10-24 PROCEDURE — G8417 CALC BMI ABV UP PARAM F/U: HCPCS | Performed by: PHYSICIAN ASSISTANT

## 2023-10-24 PROCEDURE — 81003 URINALYSIS AUTO W/O SCOPE: CPT | Performed by: PHYSICIAN ASSISTANT

## 2023-10-24 PROCEDURE — 1036F TOBACCO NON-USER: CPT | Performed by: PHYSICIAN ASSISTANT

## 2023-10-24 PROCEDURE — 1123F ACP DISCUSS/DSCN MKR DOCD: CPT | Performed by: PHYSICIAN ASSISTANT

## 2023-10-24 PROCEDURE — 99213 OFFICE O/P EST LOW 20 MIN: CPT | Performed by: PHYSICIAN ASSISTANT

## 2023-10-24 PROCEDURE — G8484 FLU IMMUNIZE NO ADMIN: HCPCS | Performed by: PHYSICIAN ASSISTANT

## 2023-10-24 PROCEDURE — G8427 DOCREV CUR MEDS BY ELIG CLIN: HCPCS | Performed by: PHYSICIAN ASSISTANT

## 2023-10-24 PROCEDURE — 3074F SYST BP LT 130 MM HG: CPT | Performed by: PHYSICIAN ASSISTANT

## 2023-10-24 PROCEDURE — G8399 PT W/DXA RESULTS DOCUMENT: HCPCS | Performed by: PHYSICIAN ASSISTANT

## 2023-10-24 PROCEDURE — PBSHW AMB POC URINALYSIS DIP STICK AUTO W/O MICRO: Performed by: PHYSICIAN ASSISTANT

## 2023-10-24 PROCEDURE — 72100 X-RAY EXAM L-S SPINE 2/3 VWS: CPT

## 2023-10-24 PROCEDURE — 1090F PRES/ABSN URINE INCON ASSESS: CPT | Performed by: PHYSICIAN ASSISTANT

## 2023-10-24 PROCEDURE — 3078F DIAST BP <80 MM HG: CPT | Performed by: PHYSICIAN ASSISTANT

## 2023-10-24 ASSESSMENT — PATIENT HEALTH QUESTIONNAIRE - PHQ9
SUM OF ALL RESPONSES TO PHQ QUESTIONS 1-9: 0
2. FEELING DOWN, DEPRESSED OR HOPELESS: 0
SUM OF ALL RESPONSES TO PHQ9 QUESTIONS 1 & 2: 0
SUM OF ALL RESPONSES TO PHQ QUESTIONS 1-9: 0
1. LITTLE INTEREST OR PLEASURE IN DOING THINGS: 0

## 2023-10-24 ASSESSMENT — ANXIETY QUESTIONNAIRES
IF YOU CHECKED OFF ANY PROBLEMS ON THIS QUESTIONNAIRE, HOW DIFFICULT HAVE THESE PROBLEMS MADE IT FOR YOU TO DO YOUR WORK, TAKE CARE OF THINGS AT HOME, OR GET ALONG WITH OTHER PEOPLE: NOT DIFFICULT AT ALL
1. FEELING NERVOUS, ANXIOUS, OR ON EDGE: 0
4. TROUBLE RELAXING: 0
3. WORRYING TOO MUCH ABOUT DIFFERENT THINGS: 0
7. FEELING AFRAID AS IF SOMETHING AWFUL MIGHT HAPPEN: 0
2. NOT BEING ABLE TO STOP OR CONTROL WORRYING: 0
6. BECOMING EASILY ANNOYED OR IRRITABLE: 0
5. BEING SO RESTLESS THAT IT IS HARD TO SIT STILL: 0
GAD7 TOTAL SCORE: 0

## 2023-10-24 NOTE — PROGRESS NOTES
Financial Resource Strain: Low Risk  (6/1/2023)    Overall Financial Resource Strain (CARDIA)     Difficulty of Paying Living Expenses: Not hard at all   Food Insecurity: No Food Insecurity (6/1/2023)    Hunger Vital Sign     Worried About Running Out of Food in the Last Year: Never true     Ran Out of Food in the Last Year: Never true   Transportation Needs: Unknown (6/1/2023)    PRAPARE - Transportation     Lack of Transportation (Medical): Not on file     Lack of Transportation (Non-Medical):  No   Physical Activity: Insufficiently Active (10/6/2023)    Exercise Vital Sign     Days of Exercise per Week: 4 days     Minutes of Exercise per Session: 30 min   Stress: Not on file   Social Connections: Not on file   Intimate Partner Violence: Not on file   Depression: Not at risk (10/24/2023)    PHQ-2     PHQ-2 Score: 0   Housing Stability: Unknown (6/1/2023)    Housing Stability Vital Sign     Unable to Pay for Housing in the Last Year: Not on file     Number of Places Lived in the Last Year: Not on file     Unstable Housing in the Last Year: No
General appearance - alert, well appearing, and in no distress and overweight  Musculoskeletal -lumbar-tenderness to palpation noted over the SI joint, increased pain with side bending to the right left and flexion, negative straight leg raise bilaterally    Assessment/ Plan:   Mica Goel was seen today for lower back pain. Diagnoses and all orders for this visit:    Right lumbar pain  -     XR LUMBAR SPINE (2-3 VIEWS); Future  -     [facility] - Reina Lin MD, Orthopedic Surgery (back, neck, spine), [place]  I explained to patient that I suspect the pain she is experiencing is due to her osteoarthritis of her back. Also a contributing factor is the discrepancy in leg length since having her total knee replacement. I have given the patient a referral to see an orthopedic doctor. I explained to patient that unfortunately due to her kidney history she should not be on NSAIDs. Patient reports that the Tylenol arthritis was helping but she ran out. Advised patient to start back on the Tylenol arthritis as needed. Right flank pain  -     AMB POC URINALYSIS DIP STICK AUTO W/O MICRO  Urinalysis appears normal  Recurrent major depressive disorder, in full remission ([address])  Stable     Time was used for level of billing: no     No follow-up provider specified. I have discussed the diagnosis with the patient and the intended plan as seen in the above orders. The patient has received an after-visit summary and questions were answered concerning future plans.      Medication Side Effects and Warnings were discussed with patient: Yes  Patient Labs were reviewed and or requested: Yes  Patient Past Records were reviewed and or requested  Yes    Mindy Campos PA-C

## 2023-10-25 DIAGNOSIS — E11.9 CONTROLLED TYPE 2 DIABETES MELLITUS WITHOUT COMPLICATION, WITHOUT LONG-TERM CURRENT USE OF INSULIN (HCC): ICD-10-CM

## 2023-10-25 RX ORDER — METFORMIN HYDROCHLORIDE 500 MG/1
1000 TABLET, EXTENDED RELEASE ORAL 2 TIMES DAILY
Qty: 360 TABLET | Refills: 2 | Status: SHIPPED | OUTPATIENT
Start: 2023-10-25

## 2023-12-05 ENCOUNTER — HOSPITAL ENCOUNTER (OUTPATIENT)
Facility: HOSPITAL | Age: 63
Discharge: HOME | End: 2023-12-08
Attending: INTERNAL MEDICINE
Payer: MEDICARE

## 2023-12-05 ENCOUNTER — NURSE ONLY (OUTPATIENT)
Age: 63
End: 2023-12-05

## 2023-12-05 DIAGNOSIS — E04.2 NONTOXIC MULTINODULAR GOITER: ICD-10-CM

## 2023-12-05 DIAGNOSIS — M81.0 AGE-RELATED OSTEOPOROSIS WITHOUT CURRENT PATHOLOGICAL FRACTURE: ICD-10-CM

## 2023-12-05 DIAGNOSIS — E04.2 MULTINODULAR GOITER: ICD-10-CM

## 2023-12-05 DIAGNOSIS — E83.52 HYPERCALCEMIA: ICD-10-CM

## 2023-12-05 DIAGNOSIS — M81.0 SENILE OSTEOPOROSIS: ICD-10-CM

## 2023-12-05 DIAGNOSIS — I95.9 HYPOTENSION, UNSPECIFIED HYPOTENSION TYPE: ICD-10-CM

## 2023-12-05 PROCEDURE — 76536 US EXAM OF HEAD AND NECK: CPT

## 2023-12-06 LAB
ALBUMIN SERPL-MCNC: 3.7 G/DL (ref 3.5–5)
ALBUMIN/GLOB SERPL: 1.1 (ref 1.1–2.2)
ALP SERPL-CCNC: 72 U/L (ref 45–117)
ALT SERPL-CCNC: 20 U/L (ref 12–78)
ANION GAP SERPL CALC-SCNC: 8 MMOL/L (ref 5–15)
AST SERPL-CCNC: 26 U/L (ref 15–37)
BILIRUB SERPL-MCNC: 0.9 MG/DL (ref 0.2–1)
BUN SERPL-MCNC: 15 MG/DL (ref 6–20)
BUN/CREAT SERPL: 21 (ref 12–20)
CALCIUM SERPL-MCNC: 10.3 MG/DL (ref 8.5–10.1)
CALCIUM SERPL-MCNC: 10.7 MG/DL (ref 8.5–10.1)
CHLORIDE SERPL-SCNC: 108 MMOL/L (ref 97–108)
CO2 SERPL-SCNC: 26 MMOL/L (ref 21–32)
CREAT SERPL-MCNC: 0.7 MG/DL (ref 0.55–1.02)
GLOBULIN SER CALC-MCNC: 3.5 G/DL (ref 2–4)
GLUCOSE SERPL-MCNC: 122 MG/DL (ref 65–100)
POTASSIUM SERPL-SCNC: 3.6 MMOL/L (ref 3.5–5.1)
PROT SERPL-MCNC: 7.2 G/DL (ref 6.4–8.2)
PTH-INTACT SERPL-MCNC: 89.5 PG/ML (ref 18.4–88)
SODIUM SERPL-SCNC: 142 MMOL/L (ref 136–145)
TSH SERPL DL<=0.05 MIU/L-ACNC: 1.86 UIU/ML (ref 0.36–3.74)

## 2023-12-07 DIAGNOSIS — E83.52 HYPERCALCEMIA: ICD-10-CM

## 2023-12-07 LAB
CALCIUM 24H UR-MRATE: <140 MG/24 HR (ref 142–353)
COLLECT DURATION TIME UR: 24 HR
CREAT 24H UR-MRATE: 1716 MG/24HR (ref 600–1800)
SODIUM 24H UR-SRATE: 202 MMOL/24HR (ref 40–220)
SPECIMEN VOL 24H UR: 2800 ML
SPECIMEN VOL 24H UR: 2800 ML
SPECIMEN VOL ?TM UR: 2800 ML

## 2023-12-10 LAB — PTH RELATED PROT SERPL-SCNC: <2 PMOL/L

## 2023-12-12 ENCOUNTER — OFFICE VISIT (OUTPATIENT)
Age: 63
End: 2023-12-12
Payer: MEDICARE

## 2023-12-12 VITALS
RESPIRATION RATE: 18 BRPM | HEIGHT: 63 IN | DIASTOLIC BLOOD PRESSURE: 60 MMHG | WEIGHT: 202.8 LBS | SYSTOLIC BLOOD PRESSURE: 129 MMHG | OXYGEN SATURATION: 100 % | TEMPERATURE: 97.2 F | HEART RATE: 61 BPM | BODY MASS INDEX: 35.93 KG/M2

## 2023-12-12 DIAGNOSIS — M81.0 AGE-RELATED OSTEOPOROSIS WITHOUT CURRENT PATHOLOGICAL FRACTURE: ICD-10-CM

## 2023-12-12 DIAGNOSIS — E04.2 NONTOXIC MULTINODULAR GOITER: ICD-10-CM

## 2023-12-12 DIAGNOSIS — E83.52 HYPERCALCEMIA: Primary | ICD-10-CM

## 2023-12-12 PROCEDURE — G8399 PT W/DXA RESULTS DOCUMENT: HCPCS | Performed by: INTERNAL MEDICINE

## 2023-12-12 PROCEDURE — 3074F SYST BP LT 130 MM HG: CPT | Performed by: INTERNAL MEDICINE

## 2023-12-12 PROCEDURE — 1090F PRES/ABSN URINE INCON ASSESS: CPT | Performed by: INTERNAL MEDICINE

## 2023-12-12 PROCEDURE — 1036F TOBACCO NON-USER: CPT | Performed by: INTERNAL MEDICINE

## 2023-12-12 PROCEDURE — 1123F ACP DISCUSS/DSCN MKR DOCD: CPT | Performed by: INTERNAL MEDICINE

## 2023-12-12 PROCEDURE — G8484 FLU IMMUNIZE NO ADMIN: HCPCS | Performed by: INTERNAL MEDICINE

## 2023-12-12 PROCEDURE — 99214 OFFICE O/P EST MOD 30 MIN: CPT | Performed by: INTERNAL MEDICINE

## 2023-12-12 PROCEDURE — 3078F DIAST BP <80 MM HG: CPT | Performed by: INTERNAL MEDICINE

## 2023-12-12 PROCEDURE — G8417 CALC BMI ABV UP PARAM F/U: HCPCS | Performed by: INTERNAL MEDICINE

## 2023-12-12 PROCEDURE — 3017F COLORECTAL CA SCREEN DOC REV: CPT | Performed by: INTERNAL MEDICINE

## 2023-12-12 PROCEDURE — G8427 DOCREV CUR MEDS BY ELIG CLIN: HCPCS | Performed by: INTERNAL MEDICINE

## 2023-12-12 NOTE — PROGRESS NOTES
Georgia Buhs is a 70 y.o. female here for   Chief Complaint   Patient presents with    Follow-up     Hypercalcemia       1. Have you been to the ER, urgent care clinic since your last visit? Hospitalized since your last visit? - no     2. Have you seen or consulted any other health care providers outside of the [address] [place] [address] since your last visit?   Include any pap smears or colon screening.- no

## 2023-12-12 NOTE — PROGRESS NOTES
[place]uinor Sung [place] [address] [facility] [address]     HISTORY OF PRESENT ILLNESS  Isaiah Lindo is a 70  y.o. female. A year old  Follow up  After  last  visit for hypercalcemia, [facility] ,  From Dec   2022         Dec 2022     During eval for hypercalcemia, she had usg and that showed thyrodi nodule   So, she underwent FNA  in sept 2022 June 2022     Here to discuss results      March 2022   The patient has  asymptomatic elevation of the serum and calcium and parathormone. She has not  been taking medication : thiazide diuretic   She has not had previous history of head or neck radiation. She does not have familial history of endocrine malignancies. Patient reports being dizzy occasionally and her BP today is on lower side     Review of Systems   None       Physical Exam   Constitutional: She is oriented to person, place, and time. She appears well-developed and well-nourished. Thyromegaly present   Psychiatric: She has a normal mood and affect. Labs        Lab Results   Component Value Date     12/05/2023    K 3.6 12/05/2023     12/05/2023    CO2 26 12/05/2023    BUN 15 12/05/2023    CREATININE 0.70 12/05/2023    GLUCOSE 122 (H) 12/05/2023    CALCIUM 10.3 (H) 12/05/2023    CALCIUM 10.7 (H) 12/05/2023    PROT 7.2 12/05/2023    LABALBU 3.7 12/05/2023    BILITOT 0.9 12/05/2023    ALKPHOS 72 12/05/2023    AST 26 12/05/2023    ALT 20 12/05/2023    LABGLOM >60 12/05/2023    GFRAA >60 08/11/2022    AGRATIO 1.0 (L) 11/29/2022    GLOB 3.5 12/05/2023    CHOL 135 06/01/2023    TRIG 64 06/01/2023    LDLCALC 39.2 06/01/2023    HDL 83 06/01/2023        Thyroid    Lab Results   Component Value Date    TSH 3.19 11/29/2022    DBB7MCZ 1.86 12/05/2023              ASSESSMENT and PLAN    1.  Hypercalcemia  : Patient has moderate range of hypercalcemia, chronic and asymptomatic     primary hyperparathyroidism    Her's is around 10  - acceptable range  Never crossed 11 gm

## 2024-01-03 RX ORDER — LOSARTAN POTASSIUM 25 MG/1
25 TABLET ORAL DAILY
Qty: 90 TABLET | Refills: 1 | Status: SHIPPED | OUTPATIENT
Start: 2024-01-03

## 2024-02-09 ENCOUNTER — OFFICE VISIT (OUTPATIENT)
Age: 64
End: 2024-02-09
Payer: MEDICARE

## 2024-02-09 VITALS
HEART RATE: 54 BPM | RESPIRATION RATE: 16 BRPM | SYSTOLIC BLOOD PRESSURE: 140 MMHG | BODY MASS INDEX: 36 KG/M2 | OXYGEN SATURATION: 99 % | WEIGHT: 200 LBS | DIASTOLIC BLOOD PRESSURE: 64 MMHG | TEMPERATURE: 97.9 F

## 2024-02-09 DIAGNOSIS — R30.0 DYSURIA: Primary | ICD-10-CM

## 2024-02-09 DIAGNOSIS — C64.2 MALIGNANT NEOPLASM OF LEFT KIDNEY, EXCEPT RENAL PELVIS (HCC): ICD-10-CM

## 2024-02-09 DIAGNOSIS — R30.0 DYSURIA: ICD-10-CM

## 2024-02-09 LAB
APPEARANCE UR: CLEAR
BACTERIA URNS QL MICRO: NEGATIVE /HPF
BASOPHILS # BLD: 0 K/UL (ref 0–0.1)
BASOPHILS NFR BLD: 1 % (ref 0–1)
BILIRUB UR QL: NEGATIVE
COLOR UR: NORMAL
DIFFERENTIAL METHOD BLD: NORMAL
EOSINOPHIL # BLD: 0.2 K/UL (ref 0–0.4)
EOSINOPHIL NFR BLD: 3 % (ref 0–7)
EPITH CASTS URNS QL MICRO: NORMAL /LPF
ERYTHROCYTE [DISTWIDTH] IN BLOOD BY AUTOMATED COUNT: 12.7 % (ref 11.5–14.5)
GLUCOSE UR STRIP.AUTO-MCNC: NEGATIVE MG/DL
HCT VFR BLD AUTO: 38.6 % (ref 35–47)
HGB BLD-MCNC: 12.6 G/DL (ref 11.5–16)
HGB UR QL STRIP: NEGATIVE
HYALINE CASTS URNS QL MICRO: NORMAL /LPF (ref 0–5)
IMM GRANULOCYTES # BLD AUTO: 0 K/UL (ref 0–0.04)
IMM GRANULOCYTES NFR BLD AUTO: 0 % (ref 0–0.5)
KETONES UR QL STRIP.AUTO: NEGATIVE MG/DL
LEUKOCYTE ESTERASE UR QL STRIP.AUTO: NEGATIVE
LYMPHOCYTES # BLD: 2.5 K/UL (ref 0.8–3.5)
LYMPHOCYTES NFR BLD: 44 % (ref 12–49)
MCH RBC QN AUTO: 30.1 PG (ref 26–34)
MCHC RBC AUTO-ENTMCNC: 32.6 G/DL (ref 30–36.5)
MCV RBC AUTO: 92.3 FL (ref 80–99)
MONOCYTES # BLD: 0.4 K/UL (ref 0–1)
MONOCYTES NFR BLD: 6 % (ref 5–13)
NEUTS SEG # BLD: 2.7 K/UL (ref 1.8–8)
NEUTS SEG NFR BLD: 46 % (ref 32–75)
NITRITE UR QL STRIP.AUTO: NEGATIVE
NRBC # BLD: 0 K/UL (ref 0–0.01)
NRBC BLD-RTO: 0 PER 100 WBC
PH UR STRIP: 5.5 (ref 5–8)
PLATELET # BLD AUTO: 284 K/UL (ref 150–400)
PMV BLD AUTO: 10 FL (ref 8.9–12.9)
PROT UR STRIP-MCNC: NEGATIVE MG/DL
RBC # BLD AUTO: 4.18 M/UL (ref 3.8–5.2)
RBC #/AREA URNS HPF: NORMAL /HPF (ref 0–5)
SP GR UR REFRACTOMETRY: 1.01 (ref 1–1.03)
URINE CULTURE IF INDICATED: NORMAL
UROBILINOGEN UR QL STRIP.AUTO: 0.2 EU/DL (ref 0.2–1)
WBC # BLD AUTO: 5.8 K/UL (ref 3.6–11)
WBC URNS QL MICRO: NORMAL /HPF (ref 0–4)

## 2024-02-09 PROCEDURE — 1036F TOBACCO NON-USER: CPT | Performed by: INTERNAL MEDICINE

## 2024-02-09 PROCEDURE — G8484 FLU IMMUNIZE NO ADMIN: HCPCS | Performed by: INTERNAL MEDICINE

## 2024-02-09 PROCEDURE — 1123F ACP DISCUSS/DSCN MKR DOCD: CPT | Performed by: INTERNAL MEDICINE

## 2024-02-09 PROCEDURE — 3077F SYST BP >= 140 MM HG: CPT | Performed by: INTERNAL MEDICINE

## 2024-02-09 PROCEDURE — 99214 OFFICE O/P EST MOD 30 MIN: CPT | Performed by: INTERNAL MEDICINE

## 2024-02-09 PROCEDURE — 3078F DIAST BP <80 MM HG: CPT | Performed by: INTERNAL MEDICINE

## 2024-02-09 PROCEDURE — 3017F COLORECTAL CA SCREEN DOC REV: CPT | Performed by: INTERNAL MEDICINE

## 2024-02-09 PROCEDURE — G8427 DOCREV CUR MEDS BY ELIG CLIN: HCPCS | Performed by: INTERNAL MEDICINE

## 2024-02-09 PROCEDURE — G8399 PT W/DXA RESULTS DOCUMENT: HCPCS | Performed by: INTERNAL MEDICINE

## 2024-02-09 PROCEDURE — 1090F PRES/ABSN URINE INCON ASSESS: CPT | Performed by: INTERNAL MEDICINE

## 2024-02-09 PROCEDURE — G8417 CALC BMI ABV UP PARAM F/U: HCPCS | Performed by: INTERNAL MEDICINE

## 2024-02-09 NOTE — PROGRESS NOTES
[facility] [place]  [address] [facility], [address] [facility] [address]  W: [phone]  F: [phone] Patient ID  Name: Nkechi Toscano  YOB: 1960  MRN: [id number]  Referring Provider:   No referring provider defined for this encounter.  Primary Care Provider:   Rajesh Fisher DO       HEMATOLOGY/MEDICAL ONCOLOGY  NOTE     Reason for Evaluation:     Chief Complaint   Patient presents with    Follow-up       Subjective:     History of Present Illness:   Date of Visit: 02/09/24    Nkechi Toscano is a 63 y.o. female who presents for a follow-up evaluation for SURVEILANCE OF RENAL CELL CANCER.             Patient overall reports feeling stable. However, has had some abdominal pain; wonders if she has a urine infection. No significant fever complaints today.    Current Outpatient Medications   Medication Instructions    acetaminophen (TYLENOL 8 HOUR ARTHRITIS PAIN) 650 mg, Oral, EVERY 8 HOURS PRN, TWO TABS MORNING, TWO TABS AT NIGHT    albuterol sulfate HFA (PROVENTIL;VENTOLIN;PROAIR) 108 (90 Base) MCG/ACT inhaler     atorvastatin (LIPITOR) 40 MG tablet TAKE 1 TABLET BY MOUTH EVERY DAY    furosemide (LASIX) 40 mg, Oral, DAILY    losartan (COZAAR) 25 mg, Oral, DAILY, Possible 10mg dosage but uncertain. Pt will check and f/u    metFORMIN (GLUCOPHAGE-XR) 1,000 mg, Oral, 2 TIMES DAILY    Multiple Vitamin (MULTI-VITAMIN DAILY PO) Oral    Multiple Vitamins-Minerals (HAIR SKIN & NAILS ADVANCED PO) 1 tablet, Oral, DAILY    NONFORMULARY 1 capsule, DAILY, Se[employer]oss     Allergies   Allergen Reactions    Shellfish Allergy Anaphylaxis      Mouth and throat swelling with smoked oysters. But can tolerate shrimp and crabs    Hydromorphone Other (See Comments)    Cephalexin Rash     Ancef challenge on 8/10/22    Doxycycline Nausea Only    Hydrochlorothiazide W-Triamterene Myalgia    Hydrocodone-Acetaminophen Itching    Meperidine Itching    Other Itching     Requires hypoallergenic

## 2024-02-09 NOTE — PROGRESS NOTES
Nkechi Toscano is a 63 y.o. female here today for follow up     1. Have you been to the ER, urgent care clinic since your last visit?  Hospitalized since your last visit?no    2. Have you seen or consulted any other health care providers outside of the [facility] since your last visit?  Include any pap smears or colon screening. no

## 2024-02-10 LAB
ALBUMIN SERPL-MCNC: 4 G/DL (ref 3.5–5)
ALBUMIN/GLOB SERPL: 1.2 (ref 1.1–2.2)
ALT SERPL-CCNC: 20 U/L (ref 12–78)
ANION GAP SERPL CALC-SCNC: 3 MMOL/L (ref 5–15)
AST SERPL-CCNC: 22 U/L (ref 15–37)
BILIRUB SERPL-MCNC: 1 MG/DL (ref 0.2–1)
BUN SERPL-MCNC: 11 MG/DL (ref 6–20)
BUN/CREAT SERPL: 15 (ref 12–20)
CALCIUM SERPL-MCNC: 11.1 MG/DL (ref 8.5–10.1)
CHLORIDE SERPL-SCNC: 106 MMOL/L (ref 97–108)
CO2 SERPL-SCNC: 29 MMOL/L (ref 21–32)
CREAT SERPL-MCNC: 0.74 MG/DL (ref 0.55–1.02)
GLOBULIN SER CALC-MCNC: 3.4 G/DL (ref 2–4)
GLUCOSE SERPL-MCNC: 95 MG/DL (ref 65–100)
POTASSIUM SERPL-SCNC: 3.8 MMOL/L (ref 3.5–5.1)
PROT SERPL-MCNC: 7.4 G/DL (ref 6.4–8.2)
SODIUM SERPL-SCNC: 138 MMOL/L (ref 136–145)

## 2024-02-12 ENCOUNTER — PATIENT MESSAGE (OUTPATIENT)
Age: 64
End: 2024-02-12

## 2024-02-17 ENCOUNTER — HOSPITAL ENCOUNTER (OUTPATIENT)
Facility: HOSPITAL | Age: 64
End: 2024-02-17
Attending: INTERNAL MEDICINE
Payer: MEDICARE

## 2024-02-17 DIAGNOSIS — C64.2 MALIGNANT NEOPLASM OF LEFT KIDNEY, EXCEPT RENAL PELVIS (HCC): ICD-10-CM

## 2024-02-17 DIAGNOSIS — C64.2 MALIGNANT NEOPLASM OF LEFT KIDNEY (HCC): ICD-10-CM

## 2024-02-17 PROCEDURE — 74176 CT ABD & PELVIS W/O CONTRAST: CPT

## 2024-02-17 PROCEDURE — 71250 CT THORAX DX C-: CPT

## 2024-02-19 ENCOUNTER — PATIENT MESSAGE (OUTPATIENT)
Age: 64
End: 2024-02-19

## 2024-02-26 ENCOUNTER — TELEPHONE (OUTPATIENT)
Age: 64
End: 2024-02-26

## 2024-02-26 NOTE — TELEPHONE ENCOUNTER
We received a fax refill request for Nkechi Toscano.  Please escribe Metformin HCL ER to their pharmacy.  The pharmacy is correct in the chart and they are requesting a 90 day supply.  Refills: 1

## 2024-02-27 DIAGNOSIS — E11.9 CONTROLLED TYPE 2 DIABETES MELLITUS WITHOUT COMPLICATION, WITHOUT LONG-TERM CURRENT USE OF INSULIN (HCC): ICD-10-CM

## 2024-02-27 RX ORDER — METFORMIN HYDROCHLORIDE 500 MG/1
1000 TABLET, EXTENDED RELEASE ORAL 2 TIMES DAILY
Qty: 360 TABLET | Refills: 2 | Status: SHIPPED | OUTPATIENT
Start: 2024-02-27

## 2024-03-13 ENCOUNTER — TRANSCRIBE ORDERS (OUTPATIENT)
Facility: HOSPITAL | Age: 64
End: 2024-03-13

## 2024-03-13 DIAGNOSIS — Z12.31 BREAST CANCER SCREENING BY MAMMOGRAM: Primary | ICD-10-CM

## 2024-03-14 ENCOUNTER — OFFICE VISIT (OUTPATIENT)
Age: 64
End: 2024-03-14
Payer: MEDICARE

## 2024-03-14 VITALS
BODY MASS INDEX: 36.54 KG/M2 | SYSTOLIC BLOOD PRESSURE: 131 MMHG | DIASTOLIC BLOOD PRESSURE: 54 MMHG | WEIGHT: 203 LBS | RESPIRATION RATE: 16 BRPM | OXYGEN SATURATION: 97 % | TEMPERATURE: 97.9 F | HEART RATE: 66 BPM

## 2024-03-14 DIAGNOSIS — R30.0 DYSURIA: ICD-10-CM

## 2024-03-14 DIAGNOSIS — C64.2 MALIGNANT NEOPLASM OF LEFT KIDNEY, EXCEPT RENAL PELVIS (HCC): Primary | ICD-10-CM

## 2024-03-14 PROCEDURE — 3017F COLORECTAL CA SCREEN DOC REV: CPT | Performed by: INTERNAL MEDICINE

## 2024-03-14 PROCEDURE — G8417 CALC BMI ABV UP PARAM F/U: HCPCS | Performed by: INTERNAL MEDICINE

## 2024-03-14 PROCEDURE — 1090F PRES/ABSN URINE INCON ASSESS: CPT | Performed by: INTERNAL MEDICINE

## 2024-03-14 PROCEDURE — 99214 OFFICE O/P EST MOD 30 MIN: CPT | Performed by: INTERNAL MEDICINE

## 2024-03-14 PROCEDURE — 3075F SYST BP GE 130 - 139MM HG: CPT | Performed by: INTERNAL MEDICINE

## 2024-03-14 PROCEDURE — G8427 DOCREV CUR MEDS BY ELIG CLIN: HCPCS | Performed by: INTERNAL MEDICINE

## 2024-03-14 PROCEDURE — G8484 FLU IMMUNIZE NO ADMIN: HCPCS | Performed by: INTERNAL MEDICINE

## 2024-03-14 PROCEDURE — G8399 PT W/DXA RESULTS DOCUMENT: HCPCS | Performed by: INTERNAL MEDICINE

## 2024-03-14 PROCEDURE — 1036F TOBACCO NON-USER: CPT | Performed by: INTERNAL MEDICINE

## 2024-03-14 PROCEDURE — 3078F DIAST BP <80 MM HG: CPT | Performed by: INTERNAL MEDICINE

## 2024-03-14 PROCEDURE — 1123F ACP DISCUSS/DSCN MKR DOCD: CPT | Performed by: INTERNAL MEDICINE

## 2024-03-14 NOTE — PROGRESS NOTES
Nkechi Toscano is a 63 y.o. female here today for follow up     1. Have you been to the ER, urgent care clinic since your last visit?  Hospitalized since your last visit? no    2. Have you seen or consulted any other health care providers outside of the [facility] since your last visit?  Include any pap smears or colon screening. no    
shrimp and crabs    Hydromorphone Other (See Comments)    Cephalexin Rash     Ancef challenge on 8/10/22    Doxycycline Nausea Only    Hydrochlorothiazide W-Triamterene Myalgia    Hydrocodone-Acetaminophen Itching    Meperidine Itching    Other Itching     Requires hypoallergenic sheets when hospitalized (from 2020 in [facility])    Oxycodone-Acetaminophen Hives and Rash    Penicillins Hives and Itching    Propoxyphene Hives and Rash    Sulfa Antibiotics Itching       Review of Systems Provided by:  Patient  Review of Systems: A complete review of systems was obtained, reviewed.  Pertinent findings reviewed above.  Past medical history, social history, and family history  are located in the electronic medical record.  Objective:     Vitals:    03/14/24 0957   BP: (!) 131/54   Pulse: 66   Resp: 16   Temp: 97.9 °F (36.6 °C)   SpO2: 97%     ECOG PS: 0- Fully active, able to carry on all pre-disease performance without restriction.  Physical Exam  Constitutional: No acute distress. and Non-toxic appearance.  Eyes: Anicteric sclerae.  Cardiovascular: S1,S2 auscultated. No pitting edema.  Pulmonary: Normal Respiratory Effort. No wheezing. No rhonchi. No rales.   Abdominal: Normal bowel sounds. Soft Abdomen to palpation. No abdominal tenderness. No guarding. No CVA tenderness on slightly forceful palpation.   Skin: No rash.   Neurological: Alert and oriented. No tremor on inspection.      Results:     I personally reviewed Epic EHR labs/results below:   Lab Results   Component Value Date    WBC 5.8 02/09/2024    HGB 12.6 02/09/2024    HCT 38.6 02/09/2024    MCV 92.3 02/09/2024     02/09/2024     Lab Results   Component Value Date/Time     02/09/2024 01:24 PM    K 3.8 02/09/2024 01:24 PM     02/09/2024 01:24 PM    CO2 29 02/09/2024 01:24 PM    BUN 11 02/09/2024 01:24 PM    CREATININE 0.74 02/09/2024 01:24 PM    GLUCOSE 95 02/09/2024 01:24 PM    CALCIUM 11.1 02/09/2024 01:24 PM    LABGLOM >60

## 2024-03-30 DIAGNOSIS — G47.00 INSOMNIA, UNSPECIFIED TYPE: ICD-10-CM

## 2024-04-02 RX ORDER — FUROSEMIDE 40 MG/1
40 TABLET ORAL DAILY
Qty: 30 TABLET | Refills: 5 | Status: SHIPPED | OUTPATIENT
Start: 2024-04-02

## 2024-04-02 RX ORDER — TRAZODONE HYDROCHLORIDE 50 MG/1
TABLET ORAL
Qty: 180 TABLET | Refills: 1 | Status: SHIPPED | OUTPATIENT
Start: 2024-04-02

## 2024-04-12 ENCOUNTER — OFFICE VISIT (OUTPATIENT)
Age: 64
End: 2024-04-12
Payer: MEDICARE

## 2024-04-12 VITALS
HEART RATE: 60 BPM | OXYGEN SATURATION: 98 % | BODY MASS INDEX: 35.97 KG/M2 | WEIGHT: 203 LBS | RESPIRATION RATE: 16 BRPM | TEMPERATURE: 98.2 F | DIASTOLIC BLOOD PRESSURE: 79 MMHG | HEIGHT: 63 IN | SYSTOLIC BLOOD PRESSURE: 111 MMHG

## 2024-04-12 DIAGNOSIS — Z13.31 POSITIVE DEPRESSION SCREENING: ICD-10-CM

## 2024-04-12 DIAGNOSIS — E11.9 CONTROLLED TYPE 2 DIABETES MELLITUS WITHOUT COMPLICATION, WITHOUT LONG-TERM CURRENT USE OF INSULIN (HCC): ICD-10-CM

## 2024-04-12 DIAGNOSIS — E66.01 SEVERE OBESITY (BMI 35.0-39.9) WITH COMORBIDITY (HCC): ICD-10-CM

## 2024-04-12 DIAGNOSIS — I50.32 CHRONIC HEART FAILURE WITH PRESERVED EJECTION FRACTION (HCC): ICD-10-CM

## 2024-04-12 DIAGNOSIS — F33.42 RECURRENT MAJOR DEPRESSIVE DISORDER, IN FULL REMISSION (HCC): ICD-10-CM

## 2024-04-12 DIAGNOSIS — F33.1 MAJOR DEPRESSIVE DISORDER, RECURRENT, MODERATE (HCC): ICD-10-CM

## 2024-04-12 DIAGNOSIS — F51.04 PSYCHOPHYSIOLOGICAL INSOMNIA: Primary | ICD-10-CM

## 2024-04-12 DIAGNOSIS — E78.5 HYPERLIPIDEMIA LDL GOAL <70: ICD-10-CM

## 2024-04-12 LAB
ALBUMIN SERPL-MCNC: 3.8 G/DL (ref 3.5–5)
ALBUMIN/GLOB SERPL: 1 (ref 1.1–2.2)
ALP SERPL-CCNC: 81 U/L (ref 45–117)
ALT SERPL-CCNC: 20 U/L (ref 12–78)
ANION GAP SERPL CALC-SCNC: 7 MMOL/L (ref 5–15)
AST SERPL-CCNC: 19 U/L (ref 15–37)
BILIRUB SERPL-MCNC: 1 MG/DL (ref 0.2–1)
BUN SERPL-MCNC: 20 MG/DL (ref 6–20)
BUN/CREAT SERPL: 24 (ref 12–20)
CALCIUM SERPL-MCNC: 11.1 MG/DL (ref 8.5–10.1)
CHLORIDE SERPL-SCNC: 103 MMOL/L (ref 97–108)
CHOLEST SERPL-MCNC: 136 MG/DL
CO2 SERPL-SCNC: 30 MMOL/L (ref 21–32)
CREAT SERPL-MCNC: 0.83 MG/DL (ref 0.55–1.02)
EST. AVERAGE GLUCOSE BLD GHB EST-MCNC: 146 MG/DL
GLOBULIN SER CALC-MCNC: 3.7 G/DL (ref 2–4)
GLUCOSE SERPL-MCNC: 123 MG/DL (ref 65–100)
HBA1C MFR BLD: 6.7 % (ref 4–5.6)
HDLC SERPL-MCNC: 79 MG/DL
HDLC SERPL: 1.7 (ref 0–5)
LDLC SERPL CALC-MCNC: 44.6 MG/DL (ref 0–100)
POTASSIUM SERPL-SCNC: 4.2 MMOL/L (ref 3.5–5.1)
PROT SERPL-MCNC: 7.5 G/DL (ref 6.4–8.2)
SODIUM SERPL-SCNC: 140 MMOL/L (ref 136–145)
TRIGL SERPL-MCNC: 62 MG/DL
VLDLC SERPL CALC-MCNC: 12.4 MG/DL

## 2024-04-12 PROCEDURE — 2022F DILAT RTA XM EVC RTNOPTHY: CPT | Performed by: FAMILY MEDICINE

## 2024-04-12 PROCEDURE — 1036F TOBACCO NON-USER: CPT | Performed by: FAMILY MEDICINE

## 2024-04-12 PROCEDURE — 1123F ACP DISCUSS/DSCN MKR DOCD: CPT | Performed by: FAMILY MEDICINE

## 2024-04-12 PROCEDURE — 99214 OFFICE O/P EST MOD 30 MIN: CPT | Performed by: FAMILY MEDICINE

## 2024-04-12 PROCEDURE — 3074F SYST BP LT 130 MM HG: CPT | Performed by: FAMILY MEDICINE

## 2024-04-12 PROCEDURE — 3017F COLORECTAL CA SCREEN DOC REV: CPT | Performed by: FAMILY MEDICINE

## 2024-04-12 PROCEDURE — G8427 DOCREV CUR MEDS BY ELIG CLIN: HCPCS | Performed by: FAMILY MEDICINE

## 2024-04-12 PROCEDURE — 1090F PRES/ABSN URINE INCON ASSESS: CPT | Performed by: FAMILY MEDICINE

## 2024-04-12 PROCEDURE — G8399 PT W/DXA RESULTS DOCUMENT: HCPCS | Performed by: FAMILY MEDICINE

## 2024-04-12 PROCEDURE — 3046F HEMOGLOBIN A1C LEVEL >9.0%: CPT | Performed by: FAMILY MEDICINE

## 2024-04-12 PROCEDURE — 3078F DIAST BP <80 MM HG: CPT | Performed by: FAMILY MEDICINE

## 2024-04-12 PROCEDURE — G8417 CALC BMI ABV UP PARAM F/U: HCPCS | Performed by: FAMILY MEDICINE

## 2024-04-12 RX ORDER — MIRTAZAPINE 7.5 MG/1
7.5 TABLET, FILM COATED ORAL NIGHTLY
Qty: 90 TABLET | Refills: 1 | Status: SHIPPED | OUTPATIENT
Start: 2024-04-12

## 2024-04-12 SDOH — ECONOMIC STABILITY: FOOD INSECURITY: WITHIN THE PAST 12 MONTHS, YOU WORRIED THAT YOUR FOOD WOULD RUN OUT BEFORE YOU GOT MONEY TO BUY MORE.: NEVER TRUE

## 2024-04-12 SDOH — ECONOMIC STABILITY: INCOME INSECURITY: HOW HARD IS IT FOR YOU TO PAY FOR THE VERY BASICS LIKE FOOD, HOUSING, MEDICAL CARE, AND HEATING?: NOT HARD AT ALL

## 2024-04-12 SDOH — ECONOMIC STABILITY: FOOD INSECURITY: WITHIN THE PAST 12 MONTHS, THE FOOD YOU BOUGHT JUST DIDN'T LAST AND YOU DIDN'T HAVE MONEY TO GET MORE.: NEVER TRUE

## 2024-04-12 ASSESSMENT — PATIENT HEALTH QUESTIONNAIRE - PHQ9
SUM OF ALL RESPONSES TO PHQ QUESTIONS 1-9: 15
SUM OF ALL RESPONSES TO PHQ QUESTIONS 1-9: 15
4. FEELING TIRED OR HAVING LITTLE ENERGY: NEARLY EVERY DAY
2. FEELING DOWN, DEPRESSED OR HOPELESS: SEVERAL DAYS
10. IF YOU CHECKED OFF ANY PROBLEMS, HOW DIFFICULT HAVE THESE PROBLEMS MADE IT FOR YOU TO DO YOUR WORK, TAKE CARE OF THINGS AT HOME, OR GET ALONG WITH OTHER PEOPLE: NOT DIFFICULT AT ALL
SUM OF ALL RESPONSES TO PHQ QUESTIONS 1-9: 15
9. THOUGHTS THAT YOU WOULD BE BETTER OFF DEAD, OR OF HURTING YOURSELF: NOT AT ALL
1. LITTLE INTEREST OR PLEASURE IN DOING THINGS: SEVERAL DAYS
SUM OF ALL RESPONSES TO PHQ QUESTIONS 1-9: 15
SUM OF ALL RESPONSES TO PHQ9 QUESTIONS 1 & 2: 2
5. POOR APPETITE OR OVEREATING: NEARLY EVERY DAY
6. FEELING BAD ABOUT YOURSELF - OR THAT YOU ARE A FAILURE OR HAVE LET YOURSELF OR YOUR FAMILY DOWN: SEVERAL DAYS
7. TROUBLE CONCENTRATING ON THINGS, SUCH AS READING THE NEWSPAPER OR WATCHING TELEVISION: NOT AT ALL
8. MOVING OR SPEAKING SO SLOWLY THAT OTHER PEOPLE COULD HAVE NOTICED. OR THE OPPOSITE, BEING SO FIGETY OR RESTLESS THAT YOU HAVE BEEN MOVING AROUND A LOT MORE THAN USUAL: NEARLY EVERY DAY
3. TROUBLE FALLING OR STAYING ASLEEP: NEARLY EVERY DAY

## 2024-04-12 NOTE — PATIENT INSTRUCTIONS

## 2024-04-12 NOTE — PROGRESS NOTES
Chief Complaint   Patient presents with    Diabetes     Patient presents in office today for diabetes check.  Has c/o right shoulder pain for about a month that has been getting worse.  Treating with Tylenol arthritis with no relief.  Also has c/o headaches.  No other concerns.         \"Have you been to the ER, urgent care clinic since your last visit?  Hospitalized since your last visit?\"    NO    “Have you seen or consulted any other health care providers outside of [facility] since your last visit?”    NO            Click Here for Release of Records Request  
ADVANCED PO) Take 1 tablet by mouth daily    NONFORMULARY 1 capsule daily Seamoss    atorvastatin (LIPITOR) 40 MG tablet TAKE 1 TABLET BY MOUTH EVERY DAY    acetaminophen (TYLENOL 8 HOUR ARTHRITIS PAIN) 650 MG extended release tablet Take 1 tablet by mouth every 8 hours as needed for Pain TWO TABS MORNING, TWO TABS AT NIGHT    Multiple Vitamin (MULTI-VITAMIN DAILY PO) Take by mouth     No current facility-administered medications for this visit.       Physical Examination: General appearance - alert, well appearing, and in no distress  Chest - clear to auscultation, no wheezes, rales or rhonchi, symmetric air entry  Heart - normal rate, regular rhythm, normal S1, S2, no murmurs, rubs, clicks or gallops      Assessment/ Plan:   Nkechi was seen today for diabetes.    Diagnoses and all orders for this visit:    Psychophysiological insomnia  -     mirtazapine (REMERON) 7.5 MG tablet; Take 1 tablet by mouth nightly    Chronic heart failure with preserved ejection fraction (HCC)  Followed by cardiology  Recurrent major depressive disorder, in full remission (HCC)    Major depressive disorder, recurrent, moderate (HCC)  Start remeron  Controlled type 2 diabetes mellitus without complication, without long-term current use of insulin (HCC)  -     Hemoglobin A1C; Future    Positive depression screening  -     mirtazapine (REMERON) 7.5 MG tablet; Take 1 tablet by mouth nightly    Severe obesity (BMI 35.0-39.9) with comorbidity (HCC)  Continue to work on diet and calorie restriction  Hyperlipidemia LDL goal <70  -     Lipid Panel; Future  -     Comprehensive Metabolic Panel; Future       Return if symptoms worsen or fail to improve.        I have discussed the diagnosis with the patient and the intended plan as seen in the above orders.  The patient has received an after-visit summary and questions were answered concerning future plans. Pt conveyed understanding of plan.    Medication Side Effects and Warnings were discussed

## 2024-04-16 ENCOUNTER — HOSPITAL ENCOUNTER (OUTPATIENT)
Facility: HOSPITAL | Age: 64
Discharge: HOME | End: 2024-04-19
Attending: FAMILY MEDICINE
Payer: MEDICARE

## 2024-04-16 DIAGNOSIS — Z12.31 BREAST CANCER SCREENING BY MAMMOGRAM: ICD-10-CM

## 2024-04-16 PROCEDURE — 77063 BREAST TOMOSYNTHESIS BI: CPT

## 2024-05-08 ENCOUNTER — OFFICE VISIT (OUTPATIENT)
Age: 64
End: 2024-05-08
Payer: MEDICARE

## 2024-05-08 VITALS
SYSTOLIC BLOOD PRESSURE: 130 MMHG | TEMPERATURE: 97.8 F | OXYGEN SATURATION: 100 % | WEIGHT: 206.2 LBS | BODY MASS INDEX: 36.54 KG/M2 | HEART RATE: 61 BPM | RESPIRATION RATE: 16 BRPM | DIASTOLIC BLOOD PRESSURE: 61 MMHG | HEIGHT: 63 IN

## 2024-05-08 DIAGNOSIS — E83.52 HYPERCALCEMIA: Primary | ICD-10-CM

## 2024-05-08 DIAGNOSIS — E11.9 CONTROLLED TYPE 2 DIABETES MELLITUS WITHOUT COMPLICATION, WITHOUT LONG-TERM CURRENT USE OF INSULIN (HCC): Primary | ICD-10-CM

## 2024-05-08 DIAGNOSIS — M81.0 AGE-RELATED OSTEOPOROSIS WITHOUT CURRENT PATHOLOGICAL FRACTURE: ICD-10-CM

## 2024-05-08 PROCEDURE — 1090F PRES/ABSN URINE INCON ASSESS: CPT | Performed by: INTERNAL MEDICINE

## 2024-05-08 PROCEDURE — G8399 PT W/DXA RESULTS DOCUMENT: HCPCS | Performed by: INTERNAL MEDICINE

## 2024-05-08 PROCEDURE — G8427 DOCREV CUR MEDS BY ELIG CLIN: HCPCS | Performed by: INTERNAL MEDICINE

## 2024-05-08 PROCEDURE — 99214 OFFICE O/P EST MOD 30 MIN: CPT | Performed by: INTERNAL MEDICINE

## 2024-05-08 PROCEDURE — G8417 CALC BMI ABV UP PARAM F/U: HCPCS | Performed by: INTERNAL MEDICINE

## 2024-05-08 PROCEDURE — 3078F DIAST BP <80 MM HG: CPT | Performed by: INTERNAL MEDICINE

## 2024-05-08 PROCEDURE — 1036F TOBACCO NON-USER: CPT | Performed by: INTERNAL MEDICINE

## 2024-05-08 PROCEDURE — 1123F ACP DISCUSS/DSCN MKR DOCD: CPT | Performed by: INTERNAL MEDICINE

## 2024-05-08 PROCEDURE — 3017F COLORECTAL CA SCREEN DOC REV: CPT | Performed by: INTERNAL MEDICINE

## 2024-05-08 PROCEDURE — 3075F SYST BP GE 130 - 139MM HG: CPT | Performed by: INTERNAL MEDICINE

## 2024-05-08 RX ORDER — SEMAGLUTIDE 0.68 MG/ML
INJECTION, SOLUTION SUBCUTANEOUS
Qty: 3 ML | Refills: 2 | Status: SHIPPED | OUTPATIENT
Start: 2024-05-08

## 2024-05-08 ASSESSMENT — PATIENT HEALTH QUESTIONNAIRE - PHQ9
SUM OF ALL RESPONSES TO PHQ QUESTIONS 1-9: 0
1. LITTLE INTEREST OR PLEASURE IN DOING THINGS: NOT AT ALL
SUM OF ALL RESPONSES TO PHQ9 QUESTIONS 1 & 2: 0
SUM OF ALL RESPONSES TO PHQ QUESTIONS 1-9: 0
2. FEELING DOWN, DEPRESSED OR HOPELESS: NOT AT ALL

## 2024-05-08 NOTE — PATIENT INSTRUCTIONS
24 hour urine collection instructions    On the day you plan to collect urine - follow these steps     1. Discard first urine sample soon after you get up. Do not collect this  sample    2. Start collecting urine samples in the container  after the first void  the entire day and night on the first day .....    3. Refrigerate the collected urine or keep it at a cool place     4. Collect the  first sample next day  into the jar. This is your final sample to complete the 24 hr collection     5. Take this sample to the lab  or  physician office

## 2024-05-09 ENCOUNTER — NURSE ONLY (OUTPATIENT)
Age: 64
End: 2024-05-09

## 2024-05-09 DIAGNOSIS — M81.0 AGE-RELATED OSTEOPOROSIS WITHOUT CURRENT PATHOLOGICAL FRACTURE: ICD-10-CM

## 2024-05-09 DIAGNOSIS — E83.52 HYPERCALCEMIA: ICD-10-CM

## 2024-05-10 ENCOUNTER — CLINICAL DOCUMENTATION (OUTPATIENT)
Age: 64
End: 2024-05-10

## 2024-05-10 LAB
25(OH)D3 SERPL-MCNC: 45.8 NG/ML (ref 30–100)
ALBUMIN SERPL-MCNC: 3.4 G/DL (ref 3.5–5)
ALBUMIN/GLOB SERPL: 0.9 (ref 1.1–2.2)
ALP SERPL-CCNC: 81 U/L (ref 45–117)
ALT SERPL-CCNC: 24 U/L (ref 12–78)
ANION GAP SERPL CALC-SCNC: 2 MMOL/L (ref 5–15)
AST SERPL-CCNC: 30 U/L (ref 15–37)
BILIRUB SERPL-MCNC: 0.7 MG/DL (ref 0.2–1)
BUN SERPL-MCNC: 16 MG/DL (ref 6–20)
BUN/CREAT SERPL: 21 (ref 12–20)
CALCIUM SERPL-MCNC: 10.8 MG/DL (ref 8.5–10.1)
CALCIUM SERPL-MCNC: 11.2 MG/DL (ref 8.5–10.1)
CHLORIDE SERPL-SCNC: 107 MMOL/L (ref 97–108)
CO2 SERPL-SCNC: 30 MMOL/L (ref 21–32)
CREAT SERPL-MCNC: 0.75 MG/DL (ref 0.55–1.02)
GLOBULIN SER CALC-MCNC: 3.7 G/DL (ref 2–4)
GLUCOSE SERPL-MCNC: 147 MG/DL (ref 65–100)
POTASSIUM SERPL-SCNC: 4.9 MMOL/L (ref 3.5–5.1)
PROT SERPL-MCNC: 7.1 G/DL (ref 6.4–8.2)
PTH-INTACT SERPL-MCNC: 96.1 PG/ML (ref 18.4–88)
SODIUM SERPL-SCNC: 139 MMOL/L (ref 136–145)

## 2024-05-13 ENCOUNTER — TELEPHONE (OUTPATIENT)
Age: 64
End: 2024-05-13

## 2024-05-13 DIAGNOSIS — E83.52 HYPERCALCEMIA: ICD-10-CM

## 2024-05-13 LAB
CALCIUM 24H UR-MRATE: <150 MG/24 HR (ref 142–353)
COLLECT DURATION TIME UR: 24 HR
CREAT 24H UR-MRATE: 1194 MG/24HR (ref 600–1800)
SODIUM 24H UR-SRATE: 231 MMOL/24HR (ref 40–220)
SPECIMEN VOL 24H UR: 3000 ML
SPECIMEN VOL 24H UR: 3000 ML
SPECIMEN VOL ?TM UR: 3000 ML

## 2024-05-13 NOTE — TELEPHONE ENCOUNTER
Spoke with patient and advised vcu/mcv will call her to schedule parathyroid spec scan. Gave dm apt and 6 mos follow up and lab apt

## 2024-05-16 LAB — PTH RELATED PROT SERPL-SCNC: <2 PMOL/L

## 2024-06-19 ENCOUNTER — OFFICE VISIT (OUTPATIENT)
Age: 64
End: 2024-06-19

## 2024-06-19 VITALS
BODY MASS INDEX: 36.14 KG/M2 | TEMPERATURE: 97 F | DIASTOLIC BLOOD PRESSURE: 66 MMHG | SYSTOLIC BLOOD PRESSURE: 120 MMHG | HEART RATE: 61 BPM | OXYGEN SATURATION: 100 % | WEIGHT: 196.4 LBS | HEIGHT: 62 IN

## 2024-06-19 DIAGNOSIS — G62.9 NEUROPATHY: ICD-10-CM

## 2024-06-19 DIAGNOSIS — E83.52 HYPERCALCEMIA: ICD-10-CM

## 2024-06-19 DIAGNOSIS — E55.9 VITAMIN D DEFICIENCY: ICD-10-CM

## 2024-06-19 DIAGNOSIS — E11.40 TYPE 2 DIABETES MELLITUS WITH DIABETIC NEUROPATHY, WITHOUT LONG-TERM CURRENT USE OF INSULIN (HCC): Primary | ICD-10-CM

## 2024-06-19 DIAGNOSIS — E78.2 MIXED HYPERLIPIDEMIA: ICD-10-CM

## 2024-06-19 DIAGNOSIS — Z85.528 H/O RENAL CELL CANCER: ICD-10-CM

## 2024-06-19 DIAGNOSIS — E04.2 NONTOXIC MULTINODULAR GOITER: ICD-10-CM

## 2024-06-19 RX ORDER — DULOXETIN HYDROCHLORIDE 30 MG/1
30 CAPSULE, DELAYED RELEASE ORAL DAILY
Qty: 30 CAPSULE | Refills: 5 | Status: SHIPPED | OUTPATIENT
Start: 2024-06-19

## 2024-06-19 RX ORDER — ATORVASTATIN CALCIUM 40 MG/1
40 TABLET, FILM COATED ORAL DAILY
Qty: 90 TABLET | Refills: 3 | Status: SHIPPED | OUTPATIENT
Start: 2024-06-19

## 2024-06-19 RX ORDER — SEMAGLUTIDE 0.68 MG/ML
INJECTION, SOLUTION SUBCUTANEOUS
Qty: 3 ML | Refills: 5 | Status: SHIPPED | OUTPATIENT
Start: 2024-06-19

## 2024-06-19 NOTE — PATIENT INSTRUCTIONS
SPECIFIC INSTRUCTIONS BELOW     Stay on ozempic  Check blood sugars only once a day by rotation as follows    First day - before b-fast  Second day- before lunch  Third day- before dinner  Fourth day before bed time    After 4 days go back to same rotation          -------------PAY ATTENTION TO THESE GENERAL INSTRUCTIONS -----------------      - The medications prescribed at this visit will not be available at pharmacy until 6 pm today        - your med list is not updated until the visit encounter is closed, so FOLLOW THE TYPED SPECIFIC INSTRUCTIONS  ABOVE     -ANY tests other than blood work, which you opt to do  outside the  [facility] facilities, you are responsible for prior authorizations if  required    - health maintenance will not be updated  on AVS, so please ignore it       Results     *Normal results will not be notified by a phone call starting January 1 2024   *If you have an upcoming visit, the results will be discussed at the visit   *Please sign up for MY CHART if you want access to your lab and test results  *Abnormal results which require immediate attention will be notified by phone call   *Abnormal results which do not require immediate assistance will be notified in 1-2 weeks       Refills    -    have your pharmacy send us a refill request . Refills are done max for one year and a visit is a must before refills are extended    Follow up appointments -  highly encourage you to make it when you are checking out. We can accommodate you into the schedule based on your clinical situation, but not for extending refills beyond a year. Labs are important to give refills and it is important to get labs before the visit     Phone calls  -  Allow  24 hrs. for non-urgent calls to be returned  Prior authorization - It may take 2 to 4 weeks to process  Forms  -  FMLA, DMV etc., will take up to 2 weeks to process  Cancellations - please notify the office 2 days in advance   Samples  - will only be

## 2024-06-19 NOTE — PROGRESS NOTES
[facility]              Trina Maurer MD FACE     HISTORY OF PRESENT ILLNESS  Nkechi Toscano is a 63  y.o. female.  Follow up  After  last  visit for hypercalcemia, MNG ,    and  DM   2     From  last  visit    May 2024   Per pt request ,   I am going to take DM 2 care           DM  2   - new diagnosis to me   History of DM 2 for 4 years, diagnosed when she had  heart failure   She is not taking Ozempic  and she was told to stop metformin     over Ozempic    ( 45 $  )   Metformin gave her GI side effects         May 2024     Her pcp is retiring and is requesting DM control also   Labs at pcp showed calcium crossing 11 and she is here for that reason       Dec 2022   During eval for hypercalcemia, she had usg and that showed thyrodi nodule   So, she underwent FNA  in sept 2022 March 2022   The patient has  asymptomatic elevation of the serum and calcium and parathormone.   She has not  been taking medication : thiazide diuretic   She has not had previous history of head or neck radiation. She does not have familial history of endocrine malignancies.  Patient reports being dizzy occasionally and her BP today is on lower side     Review of Systems   None       Physical Exam   Constitutional: She is oriented to person, place, and time. She appears well-developed and well-nourished.   Thyromegaly present   Psychiatric: She has a normal mood and affect.     Diabetic feet exam :June 2024     H/o partial or complete amputation of foot : N  H/o previous foot ulceration ; N  H/o pre - ulcerative callus : Y  H/o peripheral neuropathy and callus : Y  H/o poor circulation     BRADY   : N  Foot deformity : Y      Labs    Lab Results   Component Value Date     05/09/2024    K 4.9 05/09/2024     05/09/2024    CO2 30 05/09/2024    BUN 16 05/09/2024    CREATININE 0.75 05/09/2024    GLUCOSE 147 (H) 05/09/2024    CALCIUM 10.8 (H) 05/09/2024    CALCIUM 11.2 (H) 05/09/2024    BILITOT

## 2024-07-08 ENCOUNTER — OFFICE VISIT (OUTPATIENT)
Age: 64
End: 2024-07-08
Payer: MEDICARE

## 2024-07-08 DIAGNOSIS — E11.9 CONTROLLED TYPE 2 DIABETES MELLITUS WITHOUT COMPLICATION, WITHOUT LONG-TERM CURRENT USE OF INSULIN (HCC): Primary | ICD-10-CM

## 2024-07-08 PROCEDURE — G0108 DIAB MANAGE TRN  PER INDIV: HCPCS

## 2024-07-08 NOTE — PROGRESS NOTES
5.7  Confidence: 4.7  Preparedness: 6.1       Would benefit from DSMES related to Healthy Coping: Yes      Identifies specific people, organizations,etc, that actively support their diabetes self-care efforts: Yes      Stage of change: Action     Reducing Risks  Current state  Vaccines:  Influenza:  up to date      Pneumococcal:  up to date      Hepatitis:  up to date      Examinations:  Eye exam:  up to date       Dental exam:  up to date    Foot exam:  up to date      Heart Protection:  BP Readings from Last 2 Encounters:   06/19/24 120/66   05/08/24 130/61        Lab Results   Component Value Date/Time    LDL 44.6 04/12/2024 09:36 AM        Kidney Protection:  No results found for: \"MCA2\", \"MCAU2\"     Would benefit from DSMES related to Reducing Risks: Yes      Actively participates in decision-making with provider regarding secondary prevention:  Yes      Stage of change: Action   Problem Solving  Current state  Hypoglycemia Management:  What are signs and symptoms of hypoglycemia that you experience: Pt reported being unaware of s/s of hypoglycemia    How do you prevent hypoglycemia: patient is unaware of how to treat low blood sugars    How do you treat hypoglycemia: Patient is unaware of how to treat hypoglycemia    Hyperglycemia Management:  What are signs and symptoms of hyperglycemia that you experience: Fatigue    How can you prevent hyperglycemia: take medications as instructed    Sick Day Management:  What do you do differently on sick days:  Pt reported being unaware of self-management on sick days    Pattern Management:  Do you notice blood glucose patterns when you look at the readings in your meter or logbook? No    How do you use the blood glucose readings from your meter or logbook? understand how body responds to meals     Would benefit from DSMES related to Problem Solving: Yes      Articulates appropriate strategies to address hypoglycemia, hyperglycemia, sick day care and BG pattern:

## 2024-07-12 DIAGNOSIS — E78.2 MIXED HYPERLIPIDEMIA: ICD-10-CM

## 2024-07-12 DIAGNOSIS — E83.52 HYPERCALCEMIA: ICD-10-CM

## 2024-07-12 DIAGNOSIS — E55.9 VITAMIN D DEFICIENCY: ICD-10-CM

## 2024-07-12 DIAGNOSIS — E04.2 NONTOXIC MULTINODULAR GOITER: ICD-10-CM

## 2024-07-12 DIAGNOSIS — G62.9 NEUROPATHY: ICD-10-CM

## 2024-07-12 DIAGNOSIS — E11.40 TYPE 2 DIABETES MELLITUS WITH DIABETIC NEUROPATHY, WITHOUT LONG-TERM CURRENT USE OF INSULIN (HCC): ICD-10-CM

## 2024-07-12 RX ORDER — DULOXETIN HYDROCHLORIDE 30 MG/1
CAPSULE, DELAYED RELEASE ORAL DAILY
Qty: 90 CAPSULE | Refills: 3 | Status: SHIPPED | OUTPATIENT
Start: 2024-07-12

## 2024-07-15 ENCOUNTER — HOSPITAL ENCOUNTER (OUTPATIENT)
Facility: HOSPITAL | Age: 64
Discharge: HOME | End: 2024-07-18
Attending: INTERNAL MEDICINE
Payer: MEDICARE

## 2024-07-15 DIAGNOSIS — M81.0 AGE-RELATED OSTEOPOROSIS WITHOUT CURRENT PATHOLOGICAL FRACTURE: ICD-10-CM

## 2024-07-15 PROCEDURE — 77080 DXA BONE DENSITY AXIAL: CPT

## 2024-07-22 ENCOUNTER — APPOINTMENT (OUTPATIENT)
Facility: HOSPITAL | Age: 64
End: 2024-07-22
Payer: MEDICARE

## 2024-07-22 ENCOUNTER — HOSPITAL ENCOUNTER (EMERGENCY)
Facility: HOSPITAL | Age: 64
Discharge: HOME | End: 2024-07-22
Attending: EMERGENCY MEDICINE
Payer: MEDICARE

## 2024-07-22 VITALS
DIASTOLIC BLOOD PRESSURE: 67 MMHG | WEIGHT: 196 LBS | OXYGEN SATURATION: 96 % | BODY MASS INDEX: 36.07 KG/M2 | HEIGHT: 62 IN | TEMPERATURE: 98.5 F | SYSTOLIC BLOOD PRESSURE: 125 MMHG | RESPIRATION RATE: 16 BRPM | HEART RATE: 54 BPM

## 2024-07-22 DIAGNOSIS — R42 DIZZINESS: Primary | ICD-10-CM

## 2024-07-22 DIAGNOSIS — B34.9 VIRAL SYNDROME: ICD-10-CM

## 2024-07-22 LAB
ALBUMIN SERPL-MCNC: 3.3 G/DL (ref 3.5–5)
ALBUMIN/GLOB SERPL: 0.8 (ref 1.1–2.2)
ALP SERPL-CCNC: 73 U/L (ref 45–117)
ALT SERPL-CCNC: 20 U/L (ref 12–78)
ANION GAP SERPL CALC-SCNC: 5 MMOL/L (ref 5–15)
APPEARANCE UR: CLEAR
AST SERPL-CCNC: 20 U/L (ref 15–37)
BACTERIA URNS QL MICRO: NEGATIVE /HPF
BASOPHILS # BLD: 0 K/UL (ref 0–1)
BASOPHILS NFR BLD: 0 % (ref 0–1)
BILIRUB SERPL-MCNC: 0.8 MG/DL (ref 0.2–1)
BILIRUB UR QL: NEGATIVE
BUN SERPL-MCNC: 14 MG/DL (ref 6–20)
BUN/CREAT SERPL: 15 (ref 12–20)
CALCIUM SERPL-MCNC: 10.3 MG/DL (ref 8.5–10.1)
CHLORIDE SERPL-SCNC: 105 MMOL/L (ref 97–108)
CO2 SERPL-SCNC: 29 MMOL/L (ref 21–32)
COLOR UR: NORMAL
COMMENT:: NORMAL
CREAT SERPL-MCNC: 0.91 MG/DL (ref 0.55–1.02)
D DIMER PPP FEU-MCNC: 3.24 UG/ML(FEU)
DIFFERENTIAL METHOD BLD: ABNORMAL
EOSINOPHIL # BLD: 0.2 K/UL (ref 0–0.4)
EOSINOPHIL NFR BLD: 4 %
EPITH CASTS URNS QL MICRO: NORMAL /LPF
ERYTHROCYTE [DISTWIDTH] IN BLOOD BY AUTOMATED COUNT: 12.2 % (ref 11.5–14.5)
FLUAV RNA SPEC QL NAA+PROBE: NOT DETECTED
FLUBV RNA SPEC QL NAA+PROBE: NOT DETECTED
GLOBULIN SER CALC-MCNC: 4.3 G/DL (ref 2–4)
GLUCOSE SERPL-MCNC: 121 MG/DL (ref 65–100)
GLUCOSE UR STRIP.AUTO-MCNC: NEGATIVE MG/DL
HCT VFR BLD AUTO: 36.6 % (ref 35–47)
HGB BLD-MCNC: 12.1 G/DL (ref 11.5–16)
HGB UR QL STRIP: NEGATIVE
IMM GRANULOCYTES # BLD AUTO: 0 K/UL (ref 0–0.04)
IMM GRANULOCYTES NFR BLD AUTO: 0 % (ref 0–0.5)
KETONES UR QL STRIP.AUTO: NEGATIVE MG/DL
LEUKOCYTE ESTERASE UR QL STRIP.AUTO: NEGATIVE
LIPASE SERPL-CCNC: 81 U/L (ref 13–75)
LYMPHOCYTES # BLD: 1.7 K/UL (ref 0.8–3.5)
LYMPHOCYTES NFR BLD: 37 % (ref 12–49)
MAGNESIUM SERPL-MCNC: 2 MG/DL (ref 1.6–2.4)
MCH RBC QN AUTO: 29.9 PG (ref 26–34)
MCHC RBC AUTO-ENTMCNC: 33.1 G/DL (ref 30–36.5)
MCV RBC AUTO: 90.4 FL (ref 80–99)
MONOCYTES # BLD: 0.4 K/UL (ref 0–1)
MONOCYTES NFR BLD: 9 % (ref 5–13)
NEUTS SEG # BLD: 2.4 K/UL (ref 1.8–8)
NEUTS SEG NFR BLD: 50 % (ref 32–75)
NITRITE UR QL STRIP.AUTO: NEGATIVE
NRBC # BLD: 0 K/UL (ref 0–0.01)
NRBC BLD-RTO: 0 PER 100 WBC
NT PRO BNP: 69 PG/ML
NT PRO BNP: NORMAL PG/ML
PH UR STRIP: 7.5 (ref 5–8)
PHOSPHATE SERPL-MCNC: 2.4 MG/DL (ref 2.5–4.5)
PHOSPHATE SERPL-MCNC: NORMAL MG/DL (ref 2.5–4.5)
PLATELET # BLD AUTO: 275 K/UL (ref 150–400)
PMV BLD AUTO: 9.3 FL (ref 8.9–12.9)
POTASSIUM SERPL-SCNC: 3.7 MMOL/L (ref 3.5–5.1)
PROT SERPL-MCNC: 7.6 G/DL (ref 6.4–8.2)
PROT UR STRIP-MCNC: NEGATIVE MG/DL
RBC # BLD AUTO: 4.05 M/UL (ref 3.8–5.2)
RBC #/AREA URNS HPF: NORMAL /HPF
SARS-COV-2 RNA RESP QL NAA+PROBE: NOT DETECTED
SODIUM SERPL-SCNC: 139 MMOL/L (ref 136–145)
SP GR UR REFRACTOMETRY: 1.01 (ref 1–1.03)
SPECIMEN HOLD: NORMAL
TROPONIN T SERPL HS-MCNC: 10.7 NG/L (ref 0–14)
TROPONIN T SERPL HS-MCNC: 9.8 NG/L (ref 0–14)
TROPONIN T SERPL HS-MCNC: NORMAL NG/L (ref 0–14)
UROBILINOGEN UR QL STRIP.AUTO: 0.2 EU/DL (ref 0.2–1)
WBC # BLD AUTO: 4.7 K/UL (ref 3.6–11)
WBC URNS QL MICRO: NORMAL /HPF (ref 0–4)

## 2024-07-22 PROCEDURE — 6360000004 HC RX CONTRAST MEDICATION: Performed by: EMERGENCY MEDICINE

## 2024-07-22 PROCEDURE — 83690 ASSAY OF LIPASE: CPT

## 2024-07-22 PROCEDURE — 6360000002 HC RX W HCPCS: Performed by: PHYSICIAN ASSISTANT

## 2024-07-22 PROCEDURE — 71275 CT ANGIOGRAPHY CHEST: CPT

## 2024-07-22 PROCEDURE — 96372 THER/PROPH/DIAG INJ SC/IM: CPT

## 2024-07-22 PROCEDURE — 80053 COMPREHEN METABOLIC PANEL: CPT

## 2024-07-22 PROCEDURE — 85379 FIBRIN DEGRADATION QUANT: CPT

## 2024-07-22 PROCEDURE — 99285 EMERGENCY DEPT VISIT HI MDM: CPT

## 2024-07-22 PROCEDURE — 87636 SARSCOV2 & INF A&B AMP PRB: CPT

## 2024-07-22 PROCEDURE — 74177 CT ABD & PELVIS W/CONTRAST: CPT

## 2024-07-22 PROCEDURE — 6370000000 HC RX 637 (ALT 250 FOR IP): Performed by: PHYSICIAN ASSISTANT

## 2024-07-22 PROCEDURE — 36415 COLL VENOUS BLD VENIPUNCTURE: CPT

## 2024-07-22 PROCEDURE — 85025 COMPLETE CBC W/AUTO DIFF WBC: CPT

## 2024-07-22 PROCEDURE — 84100 ASSAY OF PHOSPHORUS: CPT

## 2024-07-22 PROCEDURE — 84484 ASSAY OF TROPONIN QUANT: CPT

## 2024-07-22 PROCEDURE — 83735 ASSAY OF MAGNESIUM: CPT

## 2024-07-22 PROCEDURE — 81001 URINALYSIS AUTO W/SCOPE: CPT

## 2024-07-22 PROCEDURE — 71046 X-RAY EXAM CHEST 2 VIEWS: CPT

## 2024-07-22 PROCEDURE — 96374 THER/PROPH/DIAG INJ IV PUSH: CPT

## 2024-07-22 PROCEDURE — 83880 ASSAY OF NATRIURETIC PEPTIDE: CPT

## 2024-07-22 PROCEDURE — 6370000000 HC RX 637 (ALT 250 FOR IP): Performed by: EMERGENCY MEDICINE

## 2024-07-22 RX ORDER — ACETAMINOPHEN 500 MG
1000 TABLET ORAL
Status: COMPLETED | OUTPATIENT
Start: 2024-07-22 | End: 2024-07-22

## 2024-07-22 RX ORDER — BUTALBITAL, ACETAMINOPHEN AND CAFFEINE 300; 40; 50 MG/1; MG/1; MG/1
1 CAPSULE ORAL EVERY 6 HOURS PRN
Qty: 11 CAPSULE | Refills: 0 | Status: SHIPPED | OUTPATIENT
Start: 2024-07-22 | End: 2024-07-22

## 2024-07-22 RX ORDER — DICYCLOMINE HYDROCHLORIDE 10 MG/ML
20 INJECTION INTRAMUSCULAR ONCE
Status: COMPLETED | OUTPATIENT
Start: 2024-07-22 | End: 2024-07-22

## 2024-07-22 RX ORDER — BUTALBITAL, ACETAMINOPHEN AND CAFFEINE 300; 40; 50 MG/1; MG/1; MG/1
1 CAPSULE ORAL EVERY 6 HOURS PRN
Qty: 11 CAPSULE | Refills: 0 | Status: SHIPPED | OUTPATIENT
Start: 2024-07-22

## 2024-07-22 RX ORDER — BUTALBITAL, ACETAMINOPHEN AND CAFFEINE 50; 325; 40 MG/1; MG/1; MG/1
1 TABLET ORAL EVERY 4 HOURS PRN
Status: DISCONTINUED | OUTPATIENT
Start: 2024-07-22 | End: 2024-07-22 | Stop reason: HOSPADM

## 2024-07-22 RX ORDER — FUROSEMIDE 10 MG/ML
40 INJECTION INTRAMUSCULAR; INTRAVENOUS ONCE
Status: COMPLETED | OUTPATIENT
Start: 2024-07-22 | End: 2024-07-22

## 2024-07-22 RX ADMIN — IOPAMIDOL 100 ML: 755 INJECTION, SOLUTION INTRAVENOUS at 15:43

## 2024-07-22 RX ADMIN — DICYCLOMINE HYDROCHLORIDE 20 MG: 10 INJECTION, SOLUTION INTRAMUSCULAR at 13:44

## 2024-07-22 RX ADMIN — BUTALBITAL, ACETAMINOPHEN, AND CAFFEINE 1 TABLET: 50; 325; 40 TABLET ORAL at 17:54

## 2024-07-22 RX ADMIN — FUROSEMIDE 40 MG: 10 INJECTION, SOLUTION INTRAMUSCULAR; INTRAVENOUS at 16:36

## 2024-07-22 RX ADMIN — ACETAMINOPHEN 1000 MG: 500 TABLET ORAL at 14:02

## 2024-07-22 ASSESSMENT — ENCOUNTER SYMPTOMS
ABDOMINAL PAIN: 1
SHORTNESS OF BREATH: 1

## 2024-07-22 NOTE — ED TRIAGE NOTES
PT ambulatory to ED with reports of feeling bad since Saturday, PT reports skin is burning in abdomen area, dizziness, SOB with generalized body aches and headache.

## 2024-07-22 NOTE — ED PROVIDER NOTES
IMPRESSION    No diagnosis found.      DISPOSITION/PLAN   DISPOSITION        PATIENT REFERRED TO:  No follow-up provider specified.    DISCHARGE MEDICATIONS:  New Prescriptions    No medications on file         Presentation, management, and disposition were discussed with the attending physician, Dr. Carson who is in agreement with plan of care.    (Please note that portions of this note were completed with a voice recognition program.  Efforts were made to edit the dictations but occasionally words are mis-transcribed.)    Suman Gudino PA-C (electronically signed)  Emergency Attending Physician / Physician Assistant / Nurse Practitioner             Suman Gudino PA-C  07/24/24 1521

## 2024-07-22 NOTE — ED NOTES
64-year-old female seen by the ACP earlier today.  She presented for dizziness and generalized not feeling well consistent with viral syndrome.  Labs are reassuring.  There was a problem with the labs being erroneously reported with elevated troponin and BNP.  These have been rechecked and are normal.  I suspect she has a viral process with headache and generalized myalgias.  She will not require hospitalization today.  Plan to give prescription for Fioricet for headaches.  Recommend Tylenol at home, she could use NSAIDs but her stomach has been bothering her.     Mason Regan MD  07/22/24 [id number]

## 2024-08-21 RX ORDER — LOSARTAN POTASSIUM 25 MG/1
25 TABLET ORAL DAILY
Qty: 90 TABLET | Refills: 1 | Status: SHIPPED | OUTPATIENT
Start: 2024-08-21 | End: 2024-08-22

## 2024-08-21 NOTE — TELEPHONE ENCOUNTER
We received a fax refill request for Nkechi Toscano.  Please escribe Furosemide to their pharmacy.  The pharmacy is correct in the chart and they are requesting a 30 day supply.

## 2024-08-22 ENCOUNTER — OFFICE VISIT (OUTPATIENT)
Age: 64
End: 2024-08-22
Payer: MEDICARE

## 2024-08-22 VITALS
SYSTOLIC BLOOD PRESSURE: 110 MMHG | OXYGEN SATURATION: 98 % | BODY MASS INDEX: 35.77 KG/M2 | DIASTOLIC BLOOD PRESSURE: 60 MMHG | HEART RATE: 66 BPM | HEIGHT: 62 IN | WEIGHT: 194.4 LBS

## 2024-08-22 DIAGNOSIS — I10 ESSENTIAL HYPERTENSION: ICD-10-CM

## 2024-08-22 DIAGNOSIS — I50.32 CHRONIC DIASTOLIC CONGESTIVE HEART FAILURE (HCC): Primary | ICD-10-CM

## 2024-08-22 DIAGNOSIS — E78.5 DYSLIPIDEMIA: ICD-10-CM

## 2024-08-22 PROCEDURE — G8417 CALC BMI ABV UP PARAM F/U: HCPCS

## 2024-08-22 PROCEDURE — G8427 DOCREV CUR MEDS BY ELIG CLIN: HCPCS

## 2024-08-22 PROCEDURE — 3074F SYST BP LT 130 MM HG: CPT

## 2024-08-22 PROCEDURE — 99214 OFFICE O/P EST MOD 30 MIN: CPT

## 2024-08-22 PROCEDURE — 3017F COLORECTAL CA SCREEN DOC REV: CPT

## 2024-08-22 PROCEDURE — 1090F PRES/ABSN URINE INCON ASSESS: CPT

## 2024-08-22 PROCEDURE — G8399 PT W/DXA RESULTS DOCUMENT: HCPCS

## 2024-08-22 PROCEDURE — 93010 ELECTROCARDIOGRAM REPORT: CPT

## 2024-08-22 PROCEDURE — 3078F DIAST BP <80 MM HG: CPT

## 2024-08-22 PROCEDURE — 1123F ACP DISCUSS/DSCN MKR DOCD: CPT

## 2024-08-22 PROCEDURE — 1036F TOBACCO NON-USER: CPT

## 2024-08-22 PROCEDURE — 93005 ELECTROCARDIOGRAM TRACING: CPT

## 2024-08-22 RX ORDER — FUROSEMIDE 40 MG/1
40 TABLET ORAL DAILY
Qty: 90 TABLET | Refills: 1 | Status: SHIPPED | OUTPATIENT
Start: 2024-08-22

## 2024-08-22 NOTE — PROGRESS NOTES
Chief Complaint   Patient presents with    Congestive Heart Failure    Hypertension     Vitals:    08/22/24 1318   BP: 110/60   Site: Left Upper Arm   Position: Sitting   Pulse: 66   SpO2: 98%   Weight: 88.2 kg (194 lb 6.4 oz)   Height: 1.575 m (5' 2\")         Chest pain: DENIED     Recent hospital stays: DENIED     Refills: DENIED     Patient states that she has discontinued Losartan. States that she ran out of the medication and never restarted. States that her BP has been ok and does not wish to restart. Patient stated that while on the medication her BP was low and she was having headaches.   
07/22/2024    ALT 20 07/22/2024    LABGLOM 70 07/22/2024    GFRAA >60 08/11/2022    AGRATIO 1.0 (L) 11/29/2022    GLOB 4.3 (H) 07/22/2024     Lab Results   Component Value Date    CHOL 136 04/12/2024    TRIG 62 04/12/2024    HDL 79 04/12/2024    VLDL 12.4 04/12/2024    CHOLHDLRATIO 1.7 04/12/2024             CARDIAC DIAGNOSTICS:     Cardiac Evaluation Includes:  I reviewed the test results below.    EKG 2/28/22 - NSR, normal  EKG 8/10/23 - NSR, normal         Venous Doppler 3/18/22 -   Bilateral lower extremity venous duplex negative for deep venous thrombosis or thrombophlebitis. INCIDENTAL FINDINGS: Prominent lymph node noted in the right  groin which measures 1.95 x 0.62 cm --->Groin U/S ordered for FU      Chest CTA 3/18/22 - no PE or dissection    Echo 3/21/22 - LVEF 63%, mild MAC    Echo 8/10/23 - PRELIM - stable       ASSESSMENT AND PLAN:     Assessment and Plan:    1) History of CHF  - Was unable to get records from [facility]    - Echo 3/21/22 - LVEF 63%, mild MAC   - she had LE swelling when lasix was cut back.  BB stopped due to low BP's in past.    - she is volume compensated (ProBNP 75 on 3/17/22)    - cont ARB, lasix   ---> she can try half dose lasix and see how that works       2) Mild Systolic Murmur    - Echo 3/21/22 - LVEF 63%, mild MAC   - no sig valve disease       3) HTN   - BP OK  - she stopped losartan and BP has been well controlled       4) Dyslipidemia   - last LDL was 45  - followed by Arsenio Francis MD      5) Obesity  - was 286 lbs  - has lost weight by eating better, taking ozemic       6) See me in 12 months    Takes care of  (stroke Nov 21)       ADONIS Genao - NP    [facility] [place]  [facility]  [address], [address]  [address]  [place], [place]  [address]  [place] [address]  Ph: [phone]   Ph [phone]

## 2024-09-03 ENCOUNTER — HOSPITAL ENCOUNTER (EMERGENCY)
Facility: HOSPITAL | Age: 64
Discharge: HOME | End: 2024-09-03
Attending: EMERGENCY MEDICINE
Payer: MEDICARE

## 2024-09-03 ENCOUNTER — APPOINTMENT (OUTPATIENT)
Facility: HOSPITAL | Age: 64
End: 2024-09-03
Payer: MEDICARE

## 2024-09-03 VITALS
BODY MASS INDEX: 35.78 KG/M2 | DIASTOLIC BLOOD PRESSURE: 67 MMHG | RESPIRATION RATE: 16 BRPM | OXYGEN SATURATION: 100 % | HEIGHT: 62 IN | TEMPERATURE: 98.7 F | WEIGHT: 194.45 LBS | SYSTOLIC BLOOD PRESSURE: 140 MMHG | HEART RATE: 69 BPM

## 2024-09-03 DIAGNOSIS — H66.92 LEFT OTITIS MEDIA, UNSPECIFIED OTITIS MEDIA TYPE: ICD-10-CM

## 2024-09-03 DIAGNOSIS — U07.1 COVID: Primary | ICD-10-CM

## 2024-09-03 LAB
FLUAV RNA SPEC QL NAA+PROBE: NOT DETECTED
FLUBV RNA SPEC QL NAA+PROBE: NOT DETECTED
SARS-COV-2 RNA RESP QL NAA+PROBE: DETECTED
SOURCE: ABNORMAL

## 2024-09-03 PROCEDURE — 87636 SARSCOV2 & INF A&B AMP PRB: CPT

## 2024-09-03 PROCEDURE — 99284 EMERGENCY DEPT VISIT MOD MDM: CPT

## 2024-09-03 PROCEDURE — 71045 X-RAY EXAM CHEST 1 VIEW: CPT

## 2024-09-03 RX ORDER — AZITHROMYCIN 250 MG/1
TABLET, FILM COATED ORAL
Qty: 6 TABLET | Refills: 0 | Status: SHIPPED | OUTPATIENT
Start: 2024-09-03 | End: 2024-09-13

## 2024-09-03 RX ORDER — BENZONATATE 100 MG/1
100 CAPSULE ORAL 3 TIMES DAILY PRN
Qty: 20 CAPSULE | Refills: 0 | Status: SHIPPED | OUTPATIENT
Start: 2024-09-03

## 2024-09-03 ASSESSMENT — PAIN SCALES - GENERAL: PAINLEVEL_OUTOF10: 9

## 2024-09-03 NOTE — ED TRIAGE NOTES
Pt arrives to ER POV c/o headache, cough, sore throat, left ear pain and body aches that started Friday night. Pt reports nausea and vomiting. Able to drink but has decreased appetite.  is sick at home. PT has tried tylenol and Theraflu at home with minimal help. She found some old cough syrup at home and tried that this morning.

## 2024-09-03 NOTE — ED PROVIDER NOTES
Left 08/09/2022    TOTAL KNEE ARTHROPLASTY Left          CURRENT MEDICATIONS       Previous Medications    ACETAMINOPHEN (TYLENOL 8 HOUR ARTHRITIS PAIN) 650 MG EXTENDED RELEASE TABLET    Take 1 tablet by mouth every 8 hours as needed for Pain TWO TABS MORNING, TWO TABS AT NIGHT    ATORVASTATIN (LIPITOR) 40 MG TABLET    Take 1 tablet by mouth daily    BUTALBITAL-APAP-CAFFEINE (FIORICET) -40 MG CAPS PER CAPSULE    Take 1 capsule by mouth every 6 hours as needed for Headaches or Migraine    CRANBERRY 50 MG CHEW    Take by mouth    CYANOCOBALAMIN (B-12 PO)    Take by mouth    DOCUSATE CALCIUM (STOOL SOFTENER PO)    Take by mouth    DULOXETINE (CYMBALTA) 30 MG EXTENDED RELEASE CAPSULE    TAKE 1 CAPSULE BY MOUTH EVERY DAY    FUROSEMIDE (LASIX) 40 MG TABLET    Take 1 tablet by mouth daily    MULTIPLE VITAMIN (MULTI-VITAMIN DAILY PO)    Take by mouth    MULTIPLE VITAMINS-MINERALS (HAIR SKIN & NAILS ADVANCED PO)    Take 1 tablet by mouth daily    NONFORMULARY    1 capsule daily Seamoss    OMEGA-3 FATTY ACIDS (FISH OIL PO)    Take by mouth    SEMAGLUTIDE,0.25 OR 0.5MG/DOS, (OZEMPIC, 0.25 OR 0.5 MG/DOSE,) 2 MG/3ML SOPN    0.5 mg a week  diag code  : E 11.65    SENNOSIDES (SENOKOT PO)    Take by mouth       ALLERGIES     Shellfish allergy, Hydromorphone, Cephalexin, Doxycycline, Hydrochlorothiazide w-triamterene, Hydrocodone-acetaminophen, Meperidine, Other, Oxycodone-acetaminophen, Penicillins, Propoxyphene, and Sulfa antibiotics    FAMILY HISTORY       Family History   Problem Relation Age of Onset    Heart Disease Mother     Heart Disease Maternal Grandmother     Lung Cancer Father     Breast Cancer Paternal Aunt           SOCIAL HISTORY       Social History     Socioeconomic History    Marital status:      Spouse name: None    Number of children: None    Years of education: None    Highest education level: None   Tobacco Use    Smoking status: Never    Smokeless tobacco: Never   Substance and Sexual Activity

## 2024-11-07 ENCOUNTER — LAB (OUTPATIENT)
Age: 64
End: 2024-11-07

## 2024-11-07 DIAGNOSIS — E11.40 TYPE 2 DIABETES MELLITUS WITH DIABETIC NEUROPATHY, WITHOUT LONG-TERM CURRENT USE OF INSULIN (HCC): ICD-10-CM

## 2024-11-07 DIAGNOSIS — E55.9 VITAMIN D DEFICIENCY: ICD-10-CM

## 2024-11-07 DIAGNOSIS — E78.2 MIXED HYPERLIPIDEMIA: ICD-10-CM

## 2024-11-07 DIAGNOSIS — E04.2 NONTOXIC MULTINODULAR GOITER: ICD-10-CM

## 2024-11-07 DIAGNOSIS — E83.52 HYPERCALCEMIA: ICD-10-CM

## 2024-11-07 DIAGNOSIS — G62.9 NEUROPATHY: ICD-10-CM

## 2024-11-08 LAB
25(OH)D3 SERPL-MCNC: 49 NG/ML (ref 30–100)
ALBUMIN SERPL-MCNC: 3.5 G/DL (ref 3.5–5)
ALBUMIN/GLOB SERPL: 1 (ref 1.1–2.2)
ALP SERPL-CCNC: 100 U/L (ref 45–117)
ALT SERPL-CCNC: 26 U/L (ref 12–78)
ANION GAP SERPL CALC-SCNC: 5 MMOL/L (ref 2–12)
AST SERPL-CCNC: 25 U/L (ref 15–37)
BILIRUB SERPL-MCNC: 1.1 MG/DL (ref 0.2–1)
BUN SERPL-MCNC: 19 MG/DL (ref 6–20)
BUN/CREAT SERPL: 23 (ref 12–20)
CALCIUM SERPL-MCNC: 10.1 MG/DL (ref 8.5–10.1)
CALCIUM SERPL-MCNC: 10.4 MG/DL (ref 8.5–10.1)
CHLORIDE SERPL-SCNC: 103 MMOL/L (ref 97–108)
CHOLEST SERPL-MCNC: 148 MG/DL
CO2 SERPL-SCNC: 30 MMOL/L (ref 21–32)
CREAT SERPL-MCNC: 0.84 MG/DL (ref 0.55–1.02)
CREAT UR-MCNC: 60.6 MG/DL
EST. AVERAGE GLUCOSE BLD GHB EST-MCNC: 177 MG/DL
GLOBULIN SER CALC-MCNC: 3.5 G/DL (ref 2–4)
GLUCOSE SERPL-MCNC: 155 MG/DL (ref 65–100)
HBA1C MFR BLD: 7.8 % (ref 4–5.6)
HDLC SERPL-MCNC: 81 MG/DL
HDLC SERPL: 1.8 (ref 0–5)
LDLC SERPL CALC-MCNC: 56.6 MG/DL (ref 0–100)
MICROALBUMIN UR-MCNC: <0.5 MG/DL
MICROALBUMIN/CREAT UR-RTO: <8 MG/G (ref 0–30)
POTASSIUM SERPL-SCNC: 4.4 MMOL/L (ref 3.5–5.1)
PROT SERPL-MCNC: 7 G/DL (ref 6.4–8.2)
PTH-INTACT SERPL-MCNC: 83.9 PG/ML (ref 18.4–88)
SODIUM SERPL-SCNC: 138 MMOL/L (ref 136–145)
T4 FREE SERPL-MCNC: 1.3 NG/DL (ref 0.8–1.5)
TRIGL SERPL-MCNC: 52 MG/DL
TSH SERPL DL<=0.05 MIU/L-ACNC: 1.9 UIU/ML (ref 0.36–3.74)
VLDLC SERPL CALC-MCNC: 10.4 MG/DL

## 2024-11-09 LAB
IGA SERPL-MCNC: 261 MG/DL (ref 87–352)
IGG SERPL-MCNC: 1256 MG/DL (ref 586–1602)
IGM SERPL-MCNC: 79 MG/DL (ref 26–217)

## 2024-11-15 ENCOUNTER — OFFICE VISIT (OUTPATIENT)
Age: 64
End: 2024-11-15

## 2024-11-15 VITALS
SYSTOLIC BLOOD PRESSURE: 116 MMHG | BODY MASS INDEX: 36.33 KG/M2 | WEIGHT: 197.4 LBS | HEIGHT: 62 IN | DIASTOLIC BLOOD PRESSURE: 65 MMHG | TEMPERATURE: 97.9 F | HEART RATE: 60 BPM | OXYGEN SATURATION: 98 %

## 2024-11-15 DIAGNOSIS — E83.52 HYPERCALCEMIA: ICD-10-CM

## 2024-11-15 DIAGNOSIS — Z85.528 H/O RENAL CELL CANCER: ICD-10-CM

## 2024-11-15 DIAGNOSIS — E55.9 VITAMIN D DEFICIENCY: ICD-10-CM

## 2024-11-15 DIAGNOSIS — E78.2 MIXED HYPERLIPIDEMIA: ICD-10-CM

## 2024-11-15 DIAGNOSIS — E04.2 NONTOXIC MULTINODULAR GOITER: ICD-10-CM

## 2024-11-15 DIAGNOSIS — E11.40 TYPE 2 DIABETES MELLITUS WITH DIABETIC NEUROPATHY, WITHOUT LONG-TERM CURRENT USE OF INSULIN (HCC): Primary | ICD-10-CM

## 2024-11-15 LAB
IGA SERPL-MCNC: 261 MG/DL (ref 87–352)
IGG SERPL-MCNC: 1256 MG/DL (ref 586–1602)
IGM SERPL-MCNC: 79 MG/DL (ref 26–217)
PROT PATTERN SERPL IFE-IMP: ABNORMAL

## 2024-11-15 RX ORDER — LOSARTAN POTASSIUM 25 MG/1
25 TABLET ORAL DAILY
COMMUNITY

## 2024-11-15 NOTE — PATIENT INSTRUCTIONS
SPECIFIC INSTRUCTIONS BELOW     Stay on ozempic  PRN when affordable       - start on metformin  Sr   750 mg  a day  - start on repaglinide   0.5 mg right before each meal         Check blood sugars only once a day by rotation as follows    First day - before b-fast  Second day- before lunch  Third day- before dinner  Fourth day before bed time    After 4 days go back to same rotation          -------------PAY ATTENTION TO THESE GENERAL INSTRUCTIONS -----------------      - The medications prescribed at this visit will not be available at pharmacy until 6 pm today        - your med list is not updated until the visit encounter is closed, so FOLLOW THE TYPED SPECIFIC INSTRUCTIONS  ABOVE     -ANY tests other than blood work, which you opt to do  outside the  [facility] imaging facilities, you are responsible for prior authorizations if  required    - health maintenance will not be updated  on AVS, so please ignore it       Results     *Normal results will not be notified by a phone call starting January 1 2024   *If you have an upcoming visit, the results will be discussed at the visit   *Please sign up for MY CHART if you want access to your lab and test results  *Abnormal results which require immediate attention will be notified by phone call   *Abnormal results which do not require immediate assistance will be notified in 1-2 weeks       Refills    -    have your pharmacy send us a refill request . Refills are done max for one year and a visit is a must before refills are extended    Follow up appointments -  highly encourage you to make it when you are checking out. We can accommodate you into the schedule based on your clinical situation, but not for extending refills beyond a year. Labs are important to give refills and it is important to get labs before the visit     Phone calls  -  Allow  24 hrs. for non-urgent calls to be returned  Prior authorization - It may take 2 to 4 weeks to process  Forms  -  FMLA,

## 2024-11-15 NOTE — PROGRESS NOTES
Nkechi Toscano is a 64 y.o. female here for   Chief Complaint   Patient presents with    Diabetes    Hypercalcemia    Goiter       1. Have you been to the ER, urgent care clinic since your last visit?  Hospitalized since your last visit? -NO    2. Have you seen or consulted any other health care providers outside of the [facility] since your last visit?  Include any pap smears or colon screening.-No        
does not require aggressive intervention , opting for  for watchful observation      2.  H/o   renal cancer July 2020  -  Left kidney - s/p  Nephrectomy     3. Thyroid nodules / MNG :   April 2022  - reviewed the usg  - A 1.3 x 1.0 x 1.6 cm solid nodule is again seen in the left thyroid lobe  S/p FNA in office  - sept 14 2022  - benign by afirma     Usg follow up  -   dec 6 2023  -  left thyroid nodule 1.5 cm  - stable  and   1 cm on  right side   Euthyroid  And  Asymptomatic      Plan is same , observation this year         4.  Has h/o CHF - following Dr. emil Atwood - on lasix           Total time 42 min     >50 %  spent time on reviewing the info,  management  and   counseling          Follow up in 6 months  regular     Reviewed results with patient and discussed the labs being ordered today/bnv  Patient voiced understanding of plan of care

## 2024-11-16 LAB — PTH RELATED PROT SERPL-SCNC: <2 PMOL/L

## 2024-11-17 RX ORDER — REPAGLINIDE 0.5 MG/1
TABLET ORAL
Qty: 270 TABLET | Refills: 1 | Status: SHIPPED | OUTPATIENT
Start: 2024-11-17

## 2024-11-17 RX ORDER — METFORMIN HYDROCHLORIDE 750 MG/1
TABLET, EXTENDED RELEASE ORAL
Qty: 90 TABLET | Refills: 3 | Status: SHIPPED | OUTPATIENT
Start: 2024-11-17

## 2024-12-02 ENCOUNTER — OFFICE VISIT (OUTPATIENT)
Facility: CLINIC | Age: 64
End: 2024-12-02

## 2024-12-02 VITALS
RESPIRATION RATE: 16 BRPM | TEMPERATURE: 97.8 F | DIASTOLIC BLOOD PRESSURE: 82 MMHG | HEART RATE: 57 BPM | SYSTOLIC BLOOD PRESSURE: 122 MMHG | HEIGHT: 62 IN | WEIGHT: 198 LBS | OXYGEN SATURATION: 97 % | BODY MASS INDEX: 36.44 KG/M2

## 2024-12-02 DIAGNOSIS — I10 ESSENTIAL HYPERTENSION: ICD-10-CM

## 2024-12-02 DIAGNOSIS — C64.2 RENAL CANCER, LEFT (HCC): ICD-10-CM

## 2024-12-02 DIAGNOSIS — E11.9 CONTROLLED TYPE 2 DIABETES MELLITUS WITHOUT COMPLICATION, WITHOUT LONG-TERM CURRENT USE OF INSULIN (HCC): ICD-10-CM

## 2024-12-02 DIAGNOSIS — E78.5 HYPERLIPIDEMIA, UNSPECIFIED HYPERLIPIDEMIA TYPE: ICD-10-CM

## 2024-12-02 DIAGNOSIS — G89.29 CHRONIC LEFT SHOULDER PAIN: ICD-10-CM

## 2024-12-02 DIAGNOSIS — Z00.01 ENCOUNTER FOR MEDICARE ANNUAL EXAMINATION WITH ABNORMAL FINDINGS: Primary | ICD-10-CM

## 2024-12-02 DIAGNOSIS — M25.512 CHRONIC LEFT SHOULDER PAIN: ICD-10-CM

## 2024-12-02 DIAGNOSIS — E83.52 HYPERCALCEMIA: ICD-10-CM

## 2024-12-02 RX ORDER — LOSARTAN POTASSIUM 25 MG/1
25 TABLET ORAL DAILY
Qty: 90 TABLET | Refills: 3 | Status: SHIPPED | OUTPATIENT
Start: 2024-12-02

## 2024-12-02 RX ORDER — NAPROXEN 500 MG/1
500 TABLET ORAL 2 TIMES DAILY WITH MEALS
Qty: 60 TABLET | Refills: 3 | Status: SHIPPED | OUTPATIENT
Start: 2024-12-02

## 2024-12-02 SDOH — ECONOMIC STABILITY: FOOD INSECURITY: WITHIN THE PAST 12 MONTHS, YOU WORRIED THAT YOUR FOOD WOULD RUN OUT BEFORE YOU GOT MONEY TO BUY MORE.: NEVER TRUE

## 2024-12-02 SDOH — ECONOMIC STABILITY: INCOME INSECURITY: HOW HARD IS IT FOR YOU TO PAY FOR THE VERY BASICS LIKE FOOD, HOUSING, MEDICAL CARE, AND HEATING?: NOT HARD AT ALL

## 2024-12-02 SDOH — ECONOMIC STABILITY: FOOD INSECURITY: WITHIN THE PAST 12 MONTHS, THE FOOD YOU BOUGHT JUST DIDN'T LAST AND YOU DIDN'T HAVE MONEY TO GET MORE.: NEVER TRUE

## 2024-12-02 ASSESSMENT — PATIENT HEALTH QUESTIONNAIRE - PHQ9
9. THOUGHTS THAT YOU WOULD BE BETTER OFF DEAD, OR OF HURTING YOURSELF: NOT AT ALL
3. TROUBLE FALLING OR STAYING ASLEEP: NOT AT ALL
1. LITTLE INTEREST OR PLEASURE IN DOING THINGS: NOT AT ALL
2. FEELING DOWN, DEPRESSED OR HOPELESS: NOT AT ALL
SUM OF ALL RESPONSES TO PHQ QUESTIONS 1-9: 0
5. POOR APPETITE OR OVEREATING: NOT AT ALL
6. FEELING BAD ABOUT YOURSELF - OR THAT YOU ARE A FAILURE OR HAVE LET YOURSELF OR YOUR FAMILY DOWN: NOT AT ALL
10. IF YOU CHECKED OFF ANY PROBLEMS, HOW DIFFICULT HAVE THESE PROBLEMS MADE IT FOR YOU TO DO YOUR WORK, TAKE CARE OF THINGS AT HOME, OR GET ALONG WITH OTHER PEOPLE: NOT DIFFICULT AT ALL
SUM OF ALL RESPONSES TO PHQ QUESTIONS 1-9: 0
4. FEELING TIRED OR HAVING LITTLE ENERGY: NOT AT ALL
7. TROUBLE CONCENTRATING ON THINGS, SUCH AS READING THE NEWSPAPER OR WATCHING TELEVISION: NOT AT ALL
SUM OF ALL RESPONSES TO PHQ9 QUESTIONS 1 & 2: 0
8. MOVING OR SPEAKING SO SLOWLY THAT OTHER PEOPLE COULD HAVE NOTICED. OR THE OPPOSITE, BEING SO FIGETY OR RESTLESS THAT YOU HAVE BEEN MOVING AROUND A LOT MORE THAN USUAL: NOT AT ALL

## 2024-12-02 NOTE — PROGRESS NOTES
Chief Complaint   Patient presents with    Medicare AWV     Patient presents in office today for AMW visit.  Has c/o left shoulder pain for about a month.  Treating with Tylenol arthritis with no relief.  No other concerns.        Nkechi A Toscano  2024  Provider:   Christin:  Diabetes Report Card   1) Have you seen the eye doctor in past year?yes    2) How would you  rate your Diabetic Diet?/10   3) How well do you take care of your feet?good   4) Do you keep your Primary Care Follow Up Appts?yes    5) Do you know your A1C goal?no    6) Do you take your medications daily?yes    7) Do you check your blood sugars?yes    8) Have you gained weight?no       9) Do you follow an exercise program?no    10) Can you do better?yes      Hemoglobin A1C   Date Value Ref Range Status   2024 7.8 (H) 4.0 - 5.6 % Final     Comment:     (NOTE)  HbA1C Interpretive Ranges  <5.7              Normal  5.7 - 6.4         Consider Prediabetes  >6.5              Consider Diabetes         \"Have you been to the ER, urgent care clinic since your last visit?  Hospitalized since your last visit?\"    NO    “Have you seen or consulted any other health care providers outside our system since your last visit?”    NO      “Have you had a diabetic eye exam?”    NO     No diabetic eye exam on file                Nkechi Toscano (:  1960) is a 64 y.o. female, here for evaluation of the following chief complaint(s):  Medicare AW      Subjective   History of Present Illness  The patient presents for a wellness check.    She is currently under the care of a nephrologist, with an upcoming appointment scheduled. Her follow-ups for kidney cancer have been proceeding well.    Dr. Reese has been monitoring her thyroid function and has noted elevated calcium levels.    She has been experiencing severe pain in her left shoulder for approximately a month, which she suspects may be due to a fracture. The pain is so intense that it prevents

## 2024-12-02 NOTE — PROGRESS NOTES
Chief Complaint   Patient presents with    Medicare AWV     Patient presents in office today for AMW visit.  Has c/o left shoulder pain for about a month.  Treating with Tylenol arthritis with no relief.  No other concerns.        Nkechi Toscano  12/2/2024  Provider:   Christin:  Diabetes Report Card   1) Have you seen the eye doctor in past year?yes    2) How would you  rate your Diabetic Diet?7/10   3) How well do you take care of your feet?good   4) Do you keep your Primary Care Follow Up Appts?yes    5) Do you know your A1C goal?no    6) Do you take your medications daily?yes    7) Do you check your blood sugars?yes    8) Have you gained weight?no       9) Do you follow an exercise program?no    10) Can you do better?yes      Hemoglobin A1C   Date Value Ref Range Status   11/07/2024 7.8 (H) 4.0 - 5.6 % Final     Comment:     (NOTE)  HbA1C Interpretive Ranges  <5.7              Normal  5.7 - 6.4         Consider Prediabetes  >6.5              Consider Diabetes         \"Have you been to the ER, urgent care clinic since your last visit?  Hospitalized since your last visit?\"    NO    “Have you seen or consulted any other health care providers outside our system since your last visit?”    NO      “Have you had a diabetic eye exam?”    NO     No diabetic eye exam on file

## 2024-12-06 ENCOUNTER — OFFICE VISIT (OUTPATIENT)
Age: 64
End: 2024-12-06
Payer: MEDICARE

## 2024-12-06 VITALS
HEART RATE: 74 BPM | OXYGEN SATURATION: 97 % | WEIGHT: 198 LBS | SYSTOLIC BLOOD PRESSURE: 111 MMHG | HEIGHT: 62 IN | DIASTOLIC BLOOD PRESSURE: 63 MMHG | BODY MASS INDEX: 36.44 KG/M2

## 2024-12-06 DIAGNOSIS — C64.2 MALIGNANT NEOPLASM OF LEFT KIDNEY, EXCEPT RENAL PELVIS (HCC): Primary | ICD-10-CM

## 2024-12-06 DIAGNOSIS — D47.2 MGUS (MONOCLONAL GAMMOPATHY OF UNKNOWN SIGNIFICANCE): ICD-10-CM

## 2024-12-06 PROCEDURE — G8484 FLU IMMUNIZE NO ADMIN: HCPCS | Performed by: INTERNAL MEDICINE

## 2024-12-06 PROCEDURE — 3074F SYST BP LT 130 MM HG: CPT | Performed by: INTERNAL MEDICINE

## 2024-12-06 PROCEDURE — G8417 CALC BMI ABV UP PARAM F/U: HCPCS | Performed by: INTERNAL MEDICINE

## 2024-12-06 PROCEDURE — 3017F COLORECTAL CA SCREEN DOC REV: CPT | Performed by: INTERNAL MEDICINE

## 2024-12-06 PROCEDURE — 99214 OFFICE O/P EST MOD 30 MIN: CPT | Performed by: INTERNAL MEDICINE

## 2024-12-06 PROCEDURE — 3078F DIAST BP <80 MM HG: CPT | Performed by: INTERNAL MEDICINE

## 2024-12-06 PROCEDURE — 1123F ACP DISCUSS/DSCN MKR DOCD: CPT | Performed by: INTERNAL MEDICINE

## 2024-12-06 PROCEDURE — 1036F TOBACCO NON-USER: CPT | Performed by: INTERNAL MEDICINE

## 2024-12-06 PROCEDURE — G8427 DOCREV CUR MEDS BY ELIG CLIN: HCPCS | Performed by: INTERNAL MEDICINE

## 2024-12-06 PROCEDURE — 1090F PRES/ABSN URINE INCON ASSESS: CPT | Performed by: INTERNAL MEDICINE

## 2024-12-06 PROCEDURE — G8399 PT W/DXA RESULTS DOCUMENT: HCPCS | Performed by: INTERNAL MEDICINE

## 2024-12-06 ASSESSMENT — PATIENT HEALTH QUESTIONNAIRE - PHQ9
1. LITTLE INTEREST OR PLEASURE IN DOING THINGS: NOT AT ALL
SUM OF ALL RESPONSES TO PHQ QUESTIONS 1-9: 0
SUM OF ALL RESPONSES TO PHQ9 QUESTIONS 1 & 2: 0
SUM OF ALL RESPONSES TO PHQ QUESTIONS 1-9: 0
2. FEELING DOWN, DEPRESSED OR HOPELESS: NOT AT ALL
SUM OF ALL RESPONSES TO PHQ QUESTIONS 1-9: 0
SUM OF ALL RESPONSES TO PHQ QUESTIONS 1-9: 0

## 2024-12-09 DIAGNOSIS — D47.2 MGUS (MONOCLONAL GAMMOPATHY OF UNKNOWN SIGNIFICANCE): ICD-10-CM

## 2024-12-09 DIAGNOSIS — E83.52 HYPERCALCEMIA: Primary | ICD-10-CM

## 2024-12-12 NOTE — PROGRESS NOTES
Patient identified by name and date of birth  Nkechi Toscano is a 64 y.o. female   Chief Complaint   Patient presents with    Follow-up     Malignant neoplasm of left kidney, except renal pelvis (HCC)      Vitals:    12/06/24 1450   BP: 111/63   Site: Right Upper Arm   Pulse: 74   SpO2: 97%   Weight: 89.8 kg (198 lb)   Height: 1.575 m (5' 2\")       No LMP recorded. Patient is postmenopausal.           \"Have you been to the ER, urgent care clinic since your last visit?  Hospitalized since your last visit?\"    NO    “Have you seen or consulted any other health care providers outside of [facility] since your last visit?”    NO      
09/09/2020    Robotic assisted laparoscopic left partial nephrectomy    OTHER SURGICAL HISTORY Left 01/24/2023    Eyelid surgery    OTHER SURGICAL HISTORY      TOTAL KNEE ARTHROPLASTY Left 08/09/2022    TOTAL KNEE ARTHROPLASTY Left       Social History     Tobacco Use    Smoking status: Never    Smokeless tobacco: Never   Substance Use Topics    Alcohol use: Yes     Comment: Wine on special occasion      Family History   Problem Relation Age of Onset    Heart Disease Mother     Heart Disease Maternal Grandmother     Lung Cancer Father     Breast Cancer Paternal Aunt      Current Outpatient Medications   Medication Sig    losartan (COZAAR) 25 MG tablet Take 1 tablet by mouth daily    naproxen (NAPROSYN) 500 MG tablet Take 1 tablet by mouth 2 times daily (with meals)    metFORMIN (GLUCOPHAGE-XR) 750 MG extended release tablet One pill once  a  day  with dinner    repaglinide (PRANDIN) 0.5 MG tablet One pill right before each meal  3 times a day    furosemide (LASIX) 40 MG tablet Take 1 tablet by mouth daily    Omega-3 Fatty Acids (FISH OIL PO) Take by mouth    Cyanocobalamin (B-12 PO) Take by mouth    Docusate Calcium (STOOL SOFTENER PO) Take by mouth    atorvastatin (LIPITOR) 40 MG tablet Take 1 tablet by mouth daily    Multiple Vitamins-Minerals (HAIR SKIN & NAILS ADVANCED PO) Take 3 tablets by mouth daily    NONFORMULARY 1 capsule daily Seamoss    Multiple Vitamin (MULTI-VITAMIN DAILY PO) Take by mouth    VITAMIN D PO Take 1 tablet by mouth daily (Patient not taking: Reported on 12/6/2024)    Sennosides (SENOKOT PO) Take 1 tablet by mouth every other day (Patient not taking: Reported on 12/6/2024)     No current facility-administered medications for this visit.      Allergies   Allergen Reactions    Shellfish Allergy Anaphylaxis      Mouth and throat swelling with smoked oysters. But can tolerate shrimp and crabs    Hydromorphone Other (See Comments)    Cephalexin Rash     Ancef challenge on 8/10/22    Doxycycline

## 2024-12-13 LAB
ALBUMIN 24H MFR UR ELPH: 0 %
ALPHA1 GLOB 24H MFR UR ELPH: 0 %
ALPHA2 GLOB 24H MFR UR ELPH: 0 %
B-GLOBULIN MFR UR ELPH: 0 %
COLLECT DURATION TIME UR: 24 HR
GAMMA GLOB 24H MFR UR ELPH: 0 %
INTERPRETATION UR IFE-IMP: NORMAL
Lab: NORMAL
M PROTEIN 24H MFR UR ELPH: NORMAL %
PROT 24H UR-MRATE: <120 MG/24 HR (ref 30–150)
PROT UR-MCNC: <4 MG/DL
SPECIMEN VOL ?TM UR: 3000 ML

## 2024-12-16 LAB
ALBUMIN SERPL ELPH-MCNC: 3.5 G/DL (ref 2.9–4.4)
ALBUMIN/GLOB SERPL: 1.1 (ref 0.7–1.7)
ALPHA1 GLOB SERPL ELPH-MCNC: 0.3 G/DL (ref 0–0.4)
ALPHA2 GLOB SERPL ELPH-MCNC: 0.7 G/DL (ref 0.4–1)
B-GLOBULIN SERPL ELPH-MCNC: 1.2 G/DL (ref 0.7–1.3)
GAMMA GLOB SERPL ELPH-MCNC: 1.2 G/DL (ref 0.4–1.8)
GLOBULIN SER-MCNC: 3.4 G/DL (ref 2.2–3.9)
IGA SERPL-MCNC: 245 MG/DL (ref 87–352)
IGG SERPL-MCNC: 1278 MG/DL (ref 586–1602)
IGM SERPL-MCNC: 84 MG/DL (ref 26–217)
INTERPRETATION SERPL IEP-IMP: ABNORMAL
KAPPA LC FREE SER-MCNC: 24.4 MG/L (ref 3.3–19.4)
KAPPA LC FREE/LAMBDA FREE SER: 0.86 (ref 0.26–1.65)
LAMBDA LC FREE SERPL-MCNC: 28.3 MG/L (ref 5.7–26.3)
M PROTEIN SERPL ELPH-MCNC: 0.4 G/DL
PROT SERPL-MCNC: 6.9 G/DL (ref 6–8.5)

## 2025-02-18 RX ORDER — FUROSEMIDE 40 MG/1
40 TABLET ORAL DAILY
Qty: 90 TABLET | Refills: 1 | Status: SHIPPED | OUTPATIENT
Start: 2025-02-18

## (undated) DEVICE — 4-PORT MANIFOLD: Brand: NEPTUNE 2

## (undated) DEVICE — SUTURE VCRL + SZ 1-0 L36IN ABSRB UD CTX 1/2 CIR TAPR PNT VCP977H

## (undated) DEVICE — SOLUTION IRRIG 3000ML 0.9% SOD CHL USP UROMATIC PLAS CONT

## (undated) DEVICE — TOTAL JOINT-SFMC: Brand: MEDLINE INDUSTRIES, INC.

## (undated) DEVICE — YANKAUER,OPEN TIP,W/O VENT,STERILE: Brand: MEDLINE INDUSTRIES, INC.

## (undated) DEVICE — DRAPE,EXTREMITY,89X128,STERILE: Brand: MEDLINE

## (undated) DEVICE — Device: Brand: JELCO

## (undated) DEVICE — 450 ML BOTTLE OF 0.05% CHLORHEXIDINE GLUCONATE IN 99.95% STERILE WATER FOR IRRIGATION, USP AND APPLICATOR.: Brand: IRRISEPT ANTIMICROBIAL WOUND LAVAGE

## (undated) DEVICE — SUTURE VCRL SZ 2-0 L36IN ABSRB UD L36MM CT-1 1/2 CIR J945H

## (undated) DEVICE — SUTURE STRATAFIX SPRL SZ 1 L14IN ABSRB VLT L48CM CTX 1/2 SXPD2B405

## (undated) DEVICE — GLOVE SURG SZ 8 L12IN FNGR THK79MIL GRN LTX FREE

## (undated) DEVICE — SPONGE LAP 18X18IN STRL -- 5/PK

## (undated) DEVICE — DRESSING ANTIMIC FOAM OPTIFOAM POSTOP ADH 4X14 IN

## (undated) DEVICE — SYR LR LCK 1ML GRAD NSAF 30ML --

## (undated) DEVICE — BANDAGE COMPR W6INXL10YD ST M E WHITE/BEIGE

## (undated) DEVICE — ATTUNE SOLO PINNING SYSTEM

## (undated) DEVICE — SMOKE EVACUATION PENCIL: Brand: VALLEYLAB

## (undated) DEVICE — ZIMMER® STERILE DISPOSABLE TOURNIQUET CUFF WITH PROTECTIVE SLEEVE AND PLC, DUAL PORT, SINGLE BLADDER, 34 IN. (86 CM)

## (undated) DEVICE — STRYKER PERFORMANCE SERIES SAGITTAL BLADE: Brand: STRYKER PERFORMANCE SERIES

## (undated) DEVICE — GLOVE ORTHO 8   MSG9480

## (undated) DEVICE — BRUSH SCRB 4% CHG RED DISP --

## (undated) DEVICE — SPONGE GZ W4XL4IN COT 12 PLY TYP VII WVN C FLD DSGN

## (undated) DEVICE — ELECTRODE BLDE L4IN NONINSULATED EDGE

## (undated) DEVICE — DRAPE,REIN 53X77,STERILE: Brand: MEDLINE

## (undated) DEVICE — SUTURE MCRYL SZ 3-0 L27IN ABSRB UD L24MM PS-1 3/8 CIR PRIM Y936H